# Patient Record
Sex: FEMALE | Race: WHITE | NOT HISPANIC OR LATINO | URBAN - METROPOLITAN AREA
[De-identification: names, ages, dates, MRNs, and addresses within clinical notes are randomized per-mention and may not be internally consistent; named-entity substitution may affect disease eponyms.]

---

## 2017-07-21 ENCOUNTER — EMERGENCY (EMERGENCY)
Facility: HOSPITAL | Age: 67
LOS: 0 days | Discharge: HOME | End: 2017-07-22
Attending: EMERGENCY MEDICINE | Admitting: EMERGENCY MEDICINE
Payer: MEDICARE

## 2017-07-21 VITALS — HEIGHT: 69 IN | WEIGHT: 155.43 LBS

## 2017-07-21 DIAGNOSIS — Z98.84 BARIATRIC SURGERY STATUS: ICD-10-CM

## 2017-07-21 DIAGNOSIS — Z90.89 ACQUIRED ABSENCE OF OTHER ORGANS: ICD-10-CM

## 2017-07-21 DIAGNOSIS — R42 DIZZINESS AND GIDDINESS: ICD-10-CM

## 2017-07-21 DIAGNOSIS — Z98.890 OTHER SPECIFIED POSTPROCEDURAL STATES: ICD-10-CM

## 2017-07-21 DIAGNOSIS — E03.9 HYPOTHYROIDISM, UNSPECIFIED: ICD-10-CM

## 2017-07-21 DIAGNOSIS — E87.1 HYPO-OSMOLALITY AND HYPONATREMIA: ICD-10-CM

## 2017-07-21 DIAGNOSIS — R06.02 SHORTNESS OF BREATH: ICD-10-CM

## 2017-07-21 DIAGNOSIS — Z72.89 OTHER PROBLEMS RELATED TO LIFESTYLE: ICD-10-CM

## 2017-07-21 DIAGNOSIS — R94.31 ABNORMAL ELECTROCARDIOGRAM [ECG] [EKG]: ICD-10-CM

## 2017-07-21 DIAGNOSIS — M19.90 UNSPECIFIED OSTEOARTHRITIS, UNSPECIFIED SITE: ICD-10-CM

## 2017-07-21 LAB
ALBUMIN SERPL ELPH-MCNC: 3.7 G/DL — SIGNIFICANT CHANGE UP (ref 3.3–5)
ALP SERPL-CCNC: 72 U/L — SIGNIFICANT CHANGE UP (ref 40–120)
ALT FLD-CCNC: 27 U/L — SIGNIFICANT CHANGE UP (ref 12–78)
ANION GAP SERPL CALC-SCNC: 10 MMOL/L — SIGNIFICANT CHANGE UP (ref 5–17)
APTT BLD: 30.7 SEC — SIGNIFICANT CHANGE UP (ref 27.5–37.4)
AST SERPL-CCNC: 24 U/L — SIGNIFICANT CHANGE UP (ref 15–37)
BASOPHILS # BLD AUTO: 0.1 K/UL — SIGNIFICANT CHANGE UP (ref 0–0.2)
BASOPHILS NFR BLD AUTO: 1.6 % — SIGNIFICANT CHANGE UP (ref 0–2)
BILIRUB SERPL-MCNC: 0.6 MG/DL — SIGNIFICANT CHANGE UP (ref 0.2–1.2)
BUN SERPL-MCNC: 14 MG/DL — SIGNIFICANT CHANGE UP (ref 7–23)
CALCIUM SERPL-MCNC: 9 MG/DL — SIGNIFICANT CHANGE UP (ref 8.5–10.1)
CHLORIDE SERPL-SCNC: 81 MMOL/L — LOW (ref 96–108)
CO2 SERPL-SCNC: 28 MMOL/L — SIGNIFICANT CHANGE UP (ref 22–31)
CREAT SERPL-MCNC: 0.76 MG/DL — SIGNIFICANT CHANGE UP (ref 0.5–1.3)
EOSINOPHIL # BLD AUTO: 0 K/UL — SIGNIFICANT CHANGE UP (ref 0–0.5)
EOSINOPHIL NFR BLD AUTO: 0.2 % — SIGNIFICANT CHANGE UP (ref 0–6)
GLUCOSE SERPL-MCNC: 131 MG/DL — HIGH (ref 70–99)
HCT VFR BLD CALC: 38.4 % — SIGNIFICANT CHANGE UP (ref 34.5–45)
HGB BLD-MCNC: 13.2 G/DL — SIGNIFICANT CHANGE UP (ref 11.5–15.5)
INR BLD: 1.03 RATIO — SIGNIFICANT CHANGE UP (ref 0.88–1.16)
LYMPHOCYTES # BLD AUTO: 0.8 K/UL — LOW (ref 1–3.3)
LYMPHOCYTES # BLD AUTO: 15.7 % — SIGNIFICANT CHANGE UP (ref 13–44)
MCHC RBC-ENTMCNC: 29 PG — SIGNIFICANT CHANGE UP (ref 27–34)
MCHC RBC-ENTMCNC: 34.3 GM/DL — SIGNIFICANT CHANGE UP (ref 32–36)
MCV RBC AUTO: 84.4 FL — SIGNIFICANT CHANGE UP (ref 80–100)
MONOCYTES # BLD AUTO: 0.4 K/UL — SIGNIFICANT CHANGE UP (ref 0–0.9)
MONOCYTES NFR BLD AUTO: 7.6 % — SIGNIFICANT CHANGE UP (ref 2–14)
NEUTROPHILS # BLD AUTO: 3.8 K/UL — SIGNIFICANT CHANGE UP (ref 1.8–7.4)
NEUTROPHILS NFR BLD AUTO: 74.8 % — SIGNIFICANT CHANGE UP (ref 43–77)
PLATELET # BLD AUTO: 490 K/UL — HIGH (ref 150–400)
POTASSIUM SERPL-MCNC: 3.6 MMOL/L — SIGNIFICANT CHANGE UP (ref 3.5–5.3)
POTASSIUM SERPL-SCNC: 3.6 MMOL/L — SIGNIFICANT CHANGE UP (ref 3.5–5.3)
PROT SERPL-MCNC: 6.7 GM/DL — SIGNIFICANT CHANGE UP (ref 6–8.3)
PROTHROM AB SERPL-ACNC: 11.1 SEC — SIGNIFICANT CHANGE UP (ref 9.8–12.7)
RBC # BLD: 4.56 M/UL — SIGNIFICANT CHANGE UP (ref 3.8–5.2)
RBC # FLD: 13.2 % — SIGNIFICANT CHANGE UP (ref 10.3–14.5)
SODIUM SERPL-SCNC: 119 MMOL/L — CRITICAL LOW (ref 135–145)
TROPONIN I SERPL-MCNC: <0.015 NG/ML — SIGNIFICANT CHANGE UP (ref 0.01–0.04)
TROPONIN I SERPL-MCNC: <0.015 NG/ML — SIGNIFICANT CHANGE UP (ref 0.01–0.04)
WBC # BLD: 5 K/UL — SIGNIFICANT CHANGE UP (ref 3.8–10.5)
WBC # FLD AUTO: 5 K/UL — SIGNIFICANT CHANGE UP (ref 3.8–10.5)

## 2017-07-21 PROCEDURE — 99284 EMERGENCY DEPT VISIT MOD MDM: CPT | Mod: 25

## 2017-07-21 PROCEDURE — 93010 ELECTROCARDIOGRAM REPORT: CPT

## 2017-07-21 PROCEDURE — 71010: CPT | Mod: 26

## 2017-07-21 RX ORDER — SODIUM CHLORIDE 9 MG/ML
3 INJECTION INTRAMUSCULAR; INTRAVENOUS; SUBCUTANEOUS ONCE
Qty: 0 | Refills: 0 | Status: COMPLETED | OUTPATIENT
Start: 2017-07-21 | End: 2017-07-21

## 2017-07-21 RX ORDER — SODIUM CHLORIDE 9 MG/ML
2000 INJECTION INTRAMUSCULAR; INTRAVENOUS; SUBCUTANEOUS ONCE
Qty: 0 | Refills: 0 | Status: COMPLETED | OUTPATIENT
Start: 2017-07-21 | End: 2017-07-21

## 2017-07-21 RX ADMIN — SODIUM CHLORIDE 2000 MILLILITER(S): 9 INJECTION INTRAMUSCULAR; INTRAVENOUS; SUBCUTANEOUS at 20:00

## 2017-07-21 RX ADMIN — SODIUM CHLORIDE 3 MILLILITER(S): 9 INJECTION INTRAMUSCULAR; INTRAVENOUS; SUBCUTANEOUS at 19:35

## 2017-07-21 NOTE — ED ADULT NURSE REASSESSMENT NOTE - NS ED NURSE REASSESS COMMENT FT1
pt resting comfortably with rails up, cardiac monitor in nplace, family at bedside. will continue to monitor.

## 2017-07-21 NOTE — ED ADULT NURSE REASSESSMENT NOTE - NS ED NURSE REASSESS COMMENT FT1
pt ambulatory to restroom with assist. warm blanket provided and cardiac monitor remains in place. VSS, IV fluids infusing. safety maintained, will continue to monitor.

## 2017-07-21 NOTE — ED ADULT NURSE NOTE - OBJECTIVE STATEMENT
c/o SOB, dizziness, nausea, diarrhea since yesterday after 5 days of drinking 1gallon of wine daily. Hx ETOH

## 2017-07-21 NOTE — ED ADULT NURSE REASSESSMENT NOTE - COMFORT CARE
side rails up/warm blanket provided/repositioned/hourly rounding completed
patient is comfortable after ambulated to the bathroom. family is at bedside. hourly rounding completed/side rails up/assisted to bathroom/ambulated to bathroom/repositioned

## 2017-07-21 NOTE — ED ADULT NURSE NOTE - CHIEF COMPLAINT QUOTE
c/o Shortness of breath, dizziness, vomiting, diarrhea for past 2 days, pt states she may be having etoh withdrawl, last drink over 24 hour ago, pt has history of aortic valve stenosis.

## 2017-07-21 NOTE — ED ADULT NURSE REASSESSMENT NOTE - GENERAL PATIENT STATE
comfortable appearance/cooperative/smiling/interactive
resting/sleeping/comfortable appearance/cooperative

## 2017-07-22 VITALS
HEART RATE: 74 BPM | OXYGEN SATURATION: 100 % | SYSTOLIC BLOOD PRESSURE: 130 MMHG | DIASTOLIC BLOOD PRESSURE: 89 MMHG | RESPIRATION RATE: 16 BRPM

## 2017-07-22 LAB
ANION GAP SERPL CALC-SCNC: 7 MMOL/L — SIGNIFICANT CHANGE UP (ref 5–17)
BUN SERPL-MCNC: 12 MG/DL — SIGNIFICANT CHANGE UP (ref 7–23)
CALCIUM SERPL-MCNC: 8.2 MG/DL — LOW (ref 8.5–10.1)
CHLORIDE SERPL-SCNC: 92 MMOL/L — LOW (ref 96–108)
CO2 SERPL-SCNC: 29 MMOL/L — SIGNIFICANT CHANGE UP (ref 22–31)
CREAT SERPL-MCNC: 0.63 MG/DL — SIGNIFICANT CHANGE UP (ref 0.5–1.3)
GLUCOSE SERPL-MCNC: 104 MG/DL — HIGH (ref 70–99)
POTASSIUM SERPL-MCNC: 3.8 MMOL/L — SIGNIFICANT CHANGE UP (ref 3.5–5.3)
POTASSIUM SERPL-SCNC: 3.8 MMOL/L — SIGNIFICANT CHANGE UP (ref 3.5–5.3)
SODIUM SERPL-SCNC: 128 MMOL/L — LOW (ref 135–145)

## 2017-07-22 NOTE — ED PROVIDER NOTE - MEDICAL DECISION MAKING DETAILS
Marycuhy CHILEL: Spoke with patient regarding her lab work and repeat sodium level. Patient has no seizure like activity, no shaking. no palpitations. No cp, sob, or palpitation now. Two negative troponin. Patient's results discussed with patient and copies given. Patient instructed to follow up with PMD and to return if any worsening of symptoms.

## 2017-07-22 NOTE — ED PROVIDER NOTE - OBJECTIVE STATEMENT
66 year old female with PMH of osteoarthritis, hypothyroidism, and alcohol use/abuse presents with cc of sob, nausea, vomiting but with no blood nor coffee ground emesis, and no abdominal pain, also complaining of fatigue. No cp, or palpitation. No shoulder or jaw pain. No spinal pain. No incontinence. No melena or hematochezia. No dysuria hematuria or frequency. No recent exotic travel. No visual problems. No seizures. No syncope. No incontinence. Patient states she also feels like she might be having these symptoms due to not having drank ETOH prior to the ED visit likely in her view withdrawal type symptoms.

## 2017-07-22 NOTE — ED PROVIDER NOTE - CARE PLAN
Principal Discharge DX:	Shortness of breath  Secondary Diagnosis:	Hyponatremia  Secondary Diagnosis:	Alcohol use

## 2017-07-22 NOTE — ED PROVIDER NOTE - PSH
Abdominal cyst removed      Section   d/t failure to progress and Repeat   Gastric Bypass  for Obesity 2004    History of Appendectomy    Parotid Mass Removed     S/P left Bunionectomy     Vocal Cord Polyp Removed   Denies current vocal cord polyps

## 2017-07-22 NOTE — ED ADULT NURSE REASSESSMENT NOTE - NS ED NURSE REASSESS COMMENT FT1
pt aware of repeat sodium level 128, no seizure activity noted while in the ED or prior to arrival per family. Pt comfortable for discharge. pt aware of repeat sodium level 128, no seizure activity noted while in the ED or prior to arrival per family. Pt comfortable for discharge and agrees to follow up.

## 2017-07-22 NOTE — ED PROVIDER NOTE - NEUROLOGICAL, MLM
Alert and oriented, no focal deficits, no motor or sensory deficits. CNs 2-12 intact. 5/5 strength in upper and lower extremities on motion.

## 2018-03-24 ENCOUNTER — EMERGENCY (EMERGENCY)
Facility: HOSPITAL | Age: 68
LOS: 0 days | Discharge: HOME | End: 2018-03-24
Attending: EMERGENCY MEDICINE | Admitting: EMERGENCY MEDICINE
Payer: MEDICARE

## 2018-03-24 VITALS
SYSTOLIC BLOOD PRESSURE: 131 MMHG | TEMPERATURE: 98 F | HEART RATE: 65 BPM | RESPIRATION RATE: 18 BRPM | DIASTOLIC BLOOD PRESSURE: 91 MMHG | OXYGEN SATURATION: 100 %

## 2018-03-24 VITALS — HEIGHT: 69 IN | WEIGHT: 149.91 LBS

## 2018-03-24 DIAGNOSIS — F10.10 ALCOHOL ABUSE, UNCOMPLICATED: ICD-10-CM

## 2018-03-24 DIAGNOSIS — M79.89 OTHER SPECIFIED SOFT TISSUE DISORDERS: ICD-10-CM

## 2018-03-24 DIAGNOSIS — E88.09 OTHER DISORDERS OF PLASMA-PROTEIN METABOLISM, NOT ELSEWHERE CLASSIFIED: ICD-10-CM

## 2018-03-24 DIAGNOSIS — I82.491 ACUTE EMBOLISM AND THROMBOSIS OF OTHER SPECIFIED DEEP VEIN OF RIGHT LOWER EXTREMITY: ICD-10-CM

## 2018-03-24 DIAGNOSIS — E87.6 HYPOKALEMIA: ICD-10-CM

## 2018-03-24 LAB
ALBUMIN SERPL ELPH-MCNC: 2.2 G/DL — LOW (ref 3.3–5)
ALP SERPL-CCNC: 94 U/L — SIGNIFICANT CHANGE UP (ref 40–120)
ALT FLD-CCNC: 25 U/L — SIGNIFICANT CHANGE UP (ref 12–78)
ANION GAP SERPL CALC-SCNC: 6 MMOL/L — SIGNIFICANT CHANGE UP (ref 5–17)
APTT BLD: 32.7 SEC — SIGNIFICANT CHANGE UP (ref 27.5–37.4)
AST SERPL-CCNC: 21 U/L — SIGNIFICANT CHANGE UP (ref 15–37)
BASOPHILS # BLD AUTO: 0.06 K/UL — SIGNIFICANT CHANGE UP (ref 0–0.2)
BASOPHILS NFR BLD AUTO: 1.5 % — SIGNIFICANT CHANGE UP (ref 0–2)
BILIRUB SERPL-MCNC: 0.3 MG/DL — SIGNIFICANT CHANGE UP (ref 0.2–1.2)
BUN SERPL-MCNC: 16 MG/DL — SIGNIFICANT CHANGE UP (ref 7–23)
CALCIUM SERPL-MCNC: 7.6 MG/DL — LOW (ref 8.5–10.1)
CHLORIDE SERPL-SCNC: 108 MMOL/L — SIGNIFICANT CHANGE UP (ref 96–108)
CO2 SERPL-SCNC: 29 MMOL/L — SIGNIFICANT CHANGE UP (ref 22–31)
CREAT SERPL-MCNC: 0.85 MG/DL — SIGNIFICANT CHANGE UP (ref 0.5–1.3)
EOSINOPHIL # BLD AUTO: 0.08 K/UL — SIGNIFICANT CHANGE UP (ref 0–0.5)
EOSINOPHIL NFR BLD AUTO: 2 % — SIGNIFICANT CHANGE UP (ref 0–6)
GLUCOSE SERPL-MCNC: 48 MG/DL — LOW (ref 70–99)
HCT VFR BLD CALC: 33 % — LOW (ref 34.5–45)
HGB BLD-MCNC: 10.9 G/DL — LOW (ref 11.5–15.5)
IMM GRANULOCYTES NFR BLD AUTO: 0.2 % — SIGNIFICANT CHANGE UP (ref 0–1.5)
INR BLD: 1.06 RATIO — SIGNIFICANT CHANGE UP (ref 0.88–1.16)
LYMPHOCYTES # BLD AUTO: 1.32 K/UL — SIGNIFICANT CHANGE UP (ref 1–3.3)
LYMPHOCYTES # BLD AUTO: 32.4 % — SIGNIFICANT CHANGE UP (ref 13–44)
MAGNESIUM SERPL-MCNC: 1.8 MG/DL — SIGNIFICANT CHANGE UP (ref 1.6–2.6)
MCHC RBC-ENTMCNC: 29.7 PG — SIGNIFICANT CHANGE UP (ref 27–34)
MCHC RBC-ENTMCNC: 33 GM/DL — SIGNIFICANT CHANGE UP (ref 32–36)
MCV RBC AUTO: 89.9 FL — SIGNIFICANT CHANGE UP (ref 80–100)
MONOCYTES # BLD AUTO: 0.34 K/UL — SIGNIFICANT CHANGE UP (ref 0–0.9)
MONOCYTES NFR BLD AUTO: 8.3 % — SIGNIFICANT CHANGE UP (ref 2–14)
NEUTROPHILS # BLD AUTO: 2.27 K/UL — SIGNIFICANT CHANGE UP (ref 1.8–7.4)
NEUTROPHILS NFR BLD AUTO: 55.6 % — SIGNIFICANT CHANGE UP (ref 43–77)
NRBC # BLD: 0 /100 WBCS — SIGNIFICANT CHANGE UP (ref 0–0)
PLATELET # BLD AUTO: 292 K/UL — SIGNIFICANT CHANGE UP (ref 150–400)
POTASSIUM SERPL-MCNC: 3 MMOL/L — LOW (ref 3.5–5.3)
POTASSIUM SERPL-SCNC: 3 MMOL/L — LOW (ref 3.5–5.3)
PROT SERPL-MCNC: 4.8 GM/DL — LOW (ref 6–8.3)
PROTHROM AB SERPL-ACNC: 11.5 SEC — SIGNIFICANT CHANGE UP (ref 9.8–12.7)
RBC # BLD: 3.67 M/UL — LOW (ref 3.8–5.2)
RBC # FLD: 16.8 % — HIGH (ref 10.3–14.5)
SODIUM SERPL-SCNC: 143 MMOL/L — SIGNIFICANT CHANGE UP (ref 135–145)
WBC # BLD: 4.08 K/UL — SIGNIFICANT CHANGE UP (ref 3.8–10.5)
WBC # FLD AUTO: 4.08 K/UL — SIGNIFICANT CHANGE UP (ref 3.8–10.5)

## 2018-03-24 PROCEDURE — 93010 ELECTROCARDIOGRAM REPORT: CPT

## 2018-03-24 PROCEDURE — 99285 EMERGENCY DEPT VISIT HI MDM: CPT

## 2018-03-24 PROCEDURE — 93970 EXTREMITY STUDY: CPT | Mod: 26

## 2018-03-24 RX ORDER — POTASSIUM CHLORIDE 20 MEQ
40 PACKET (EA) ORAL ONCE
Qty: 0 | Refills: 0 | Status: COMPLETED | OUTPATIENT
Start: 2018-03-24 | End: 2018-03-24

## 2018-03-24 RX ADMIN — Medication 40 MILLIEQUIVALENT(S): at 18:38

## 2018-03-24 NOTE — ED STATDOCS - PLAN OF CARE
1) Continue home medications as prescribed   2) Follow up with Dr. Barroos in 1-2 days for reevaluation, call for appointment   3) You were given a copy of your results, please show them to your doctor for review.   4) Repeat ultrasound within 7 days for evaluation of deep venous thrombosis   5) Your potassium was low please discuss this with your doctor   6) Your protein level was low please discuss this with your doctor  7) Return to the ED for worsening swelling, pain, redness, fever greater than 100.4, chest pain, shortness of breath, nausea, vomiting, dizziness, lightheadedness, or if you have any other new, worsening, or concerning symptoms.

## 2018-03-24 NOTE — ED STATDOCS - CARDIAC, MLM
normal rate, regular rhythm. 2+ pitting edema bilateral lower extremities. Palpable dorsalis pedis pulses.

## 2018-03-24 NOTE — ED ADULT TRIAGE NOTE - CHIEF COMPLAINT QUOTE
Pt. to the ED C/O Bilateral leg swelling x 1 month - Pt. states she was told by PCP to come to the ED - Denies CP and SOB -

## 2018-03-24 NOTE — ED STATDOCS - CARE PLAN
Principal Discharge DX:	Hypokalemia  Assessment and plan of treatment:	1) Continue home medications as prescribed   2) Follow up with Dr. Barroso in 1-2 days for reevaluation, call for appointment   3) You were given a copy of your results, please show them to your doctor for review.   4) Repeat ultrasound within 7 days for evaluation of deep venous thrombosis   5) Your potassium was low please discuss this with your doctor   6) Your protein level was low please discuss this with your doctor  7) Return to the ED for worsening swelling, pain, redness, fever greater than 100.4, chest pain, shortness of breath, nausea, vomiting, dizziness, lightheadedness, or if you have any other new, worsening, or concerning symptoms.  Secondary Diagnosis:	Hypoalbuminemia  Secondary Diagnosis:	Acute deep vein thrombosis (DVT) of other specified vein of right lower extremity

## 2018-03-24 NOTE — ED STATDOCS - PROGRESS NOTE DETAILS
MD Kaleigh: Patient initially seen by attending in intake. On reevaluation this is a 67 year old female, past medical history hypothyroidism, alcohol abuse, s/p rehab 1 month ago, presents to the ED for bilateral lower leg swelling over past month. Patient reports no pain, redness, chest pain, shortness of breath, fevers or chills.   Gen: no acute distress non toxic alert and coherent, no cyanosis HEENT: no scleral icterus PERRL EOMI Resp: No acute distress, equal chest rise and fall, lungs clear bilaterally, CVS: RRR S1/S2, soft systolic murmur no gallop. pedal edema 1+ bilaterally, no jvd ABD: soft supple non tender, no rebound or guarding no hepatosplenomegaly no other masses. no hernias. Extremities: No deformities, no rash Neuro: Alert, No focal neuro deficits Power equal / normal. Sensory intact , ambulatory,   Likely related to low albumin given chronic alcohol abuse, evaluate for renal failure. d/w Dr. Calvert, although cardiomyopathy on differential patient without chest pain, shortness of breath, so holding BNP, urgent TTE Patient found to have right gastroc deep venous thrombosis without proximal extension, discussed with Dr. Walker Barroso given controversial treatment will do serial exams at this time, patient to f/u in office in 1-2 days for reevaluation, and repeat sono within 1 week. Patient also noted to be hypoalbuminemic and hypokalemic, Dr. Barroso will follow this up. He will also likely get outpatient TTE.   Patient feels well, she has no calf pain or swelling bilaterally, no chest pain or shortness of breath, on  reexamination, lungs clear, no resp distress, cardiac RRR, S1 and S2, soft systolic murmur, no calf tenderness, 1+ pitting edema, negative brennan sign,   discussed labwork and imaging with patient and need for f/u with primary medical doctor, strict return precautions given, especially signs/symptoms of worsening DVT/PE. D/W Dr. Calvert

## 2018-06-20 ENCOUNTER — EMERGENCY (EMERGENCY)
Facility: HOSPITAL | Age: 68
LOS: 0 days | Discharge: HOME | End: 2018-06-20
Attending: EMERGENCY MEDICINE | Admitting: EMERGENCY MEDICINE
Payer: MEDICARE

## 2018-06-20 VITALS
OXYGEN SATURATION: 99 % | DIASTOLIC BLOOD PRESSURE: 77 MMHG | RESPIRATION RATE: 17 BRPM | SYSTOLIC BLOOD PRESSURE: 119 MMHG | TEMPERATURE: 98 F | HEART RATE: 84 BPM

## 2018-06-20 VITALS
TEMPERATURE: 98 F | WEIGHT: 130.07 LBS | HEART RATE: 80 BPM | HEIGHT: 65 IN | RESPIRATION RATE: 16 BRPM | DIASTOLIC BLOOD PRESSURE: 80 MMHG | OXYGEN SATURATION: 97 % | SYSTOLIC BLOOD PRESSURE: 108 MMHG
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
## 2018-06-20 DIAGNOSIS — F10.120 ALCOHOL ABUSE WITH INTOXICATION, UNCOMPLICATED: ICD-10-CM

## 2018-06-20 DIAGNOSIS — E03.9 HYPOTHYROIDISM, UNSPECIFIED: ICD-10-CM

## 2018-06-20 DIAGNOSIS — Z98.84 BARIATRIC SURGERY STATUS: ICD-10-CM

## 2018-06-20 LAB
ALBUMIN SERPL ELPH-MCNC: 2.1 G/DL — LOW (ref 3.3–5)
ALP SERPL-CCNC: 354 U/L — HIGH (ref 40–120)
ALT FLD-CCNC: 97 U/L — HIGH (ref 12–78)
ANION GAP SERPL CALC-SCNC: 11 MMOL/L — SIGNIFICANT CHANGE UP (ref 5–17)
APAP SERPL-MCNC: 4 UG/ML — LOW (ref 10–30)
APTT BLD: 33 SEC — SIGNIFICANT CHANGE UP (ref 27.5–37.4)
AST SERPL-CCNC: 93 U/L — HIGH (ref 15–37)
BASOPHILS # BLD AUTO: 0.07 K/UL — SIGNIFICANT CHANGE UP (ref 0–0.2)
BASOPHILS NFR BLD AUTO: 1.9 % — SIGNIFICANT CHANGE UP (ref 0–2)
BILIRUB SERPL-MCNC: 0.3 MG/DL — SIGNIFICANT CHANGE UP (ref 0.2–1.2)
BUN SERPL-MCNC: 17 MG/DL — SIGNIFICANT CHANGE UP (ref 7–23)
CALCIUM SERPL-MCNC: 7.6 MG/DL — LOW (ref 8.5–10.1)
CHLORIDE SERPL-SCNC: 96 MMOL/L — SIGNIFICANT CHANGE UP (ref 96–108)
CO2 SERPL-SCNC: 27 MMOL/L — SIGNIFICANT CHANGE UP (ref 22–31)
CREAT SERPL-MCNC: 1.01 MG/DL — SIGNIFICANT CHANGE UP (ref 0.5–1.3)
EOSINOPHIL # BLD AUTO: 0.02 K/UL — SIGNIFICANT CHANGE UP (ref 0–0.5)
EOSINOPHIL NFR BLD AUTO: 0.5 % — SIGNIFICANT CHANGE UP (ref 0–6)
ETHANOL SERPL-MCNC: 112 MG/DL — HIGH (ref 0–10)
GLUCOSE SERPL-MCNC: 66 MG/DL — LOW (ref 70–99)
HCT VFR BLD CALC: 37.1 % — SIGNIFICANT CHANGE UP (ref 34.5–45)
HGB BLD-MCNC: 12.9 G/DL — SIGNIFICANT CHANGE UP (ref 11.5–15.5)
IMM GRANULOCYTES NFR BLD AUTO: 0.3 % — SIGNIFICANT CHANGE UP (ref 0–1.5)
INR BLD: 0.94 RATIO — SIGNIFICANT CHANGE UP (ref 0.88–1.16)
LYMPHOCYTES # BLD AUTO: 1.11 K/UL — SIGNIFICANT CHANGE UP (ref 1–3.3)
LYMPHOCYTES # BLD AUTO: 30 % — SIGNIFICANT CHANGE UP (ref 13–44)
MCHC RBC-ENTMCNC: 31.2 PG — SIGNIFICANT CHANGE UP (ref 27–34)
MCHC RBC-ENTMCNC: 34.8 GM/DL — SIGNIFICANT CHANGE UP (ref 32–36)
MCV RBC AUTO: 89.6 FL — SIGNIFICANT CHANGE UP (ref 80–100)
MONOCYTES # BLD AUTO: 0.2 K/UL — SIGNIFICANT CHANGE UP (ref 0–0.9)
MONOCYTES NFR BLD AUTO: 5.4 % — SIGNIFICANT CHANGE UP (ref 2–14)
NEUTROPHILS # BLD AUTO: 2.29 K/UL — SIGNIFICANT CHANGE UP (ref 1.8–7.4)
NEUTROPHILS NFR BLD AUTO: 61.9 % — SIGNIFICANT CHANGE UP (ref 43–77)
NRBC # BLD: 0 /100 WBCS — SIGNIFICANT CHANGE UP (ref 0–0)
PLATELET # BLD AUTO: 207 K/UL — SIGNIFICANT CHANGE UP (ref 150–400)
POTASSIUM SERPL-MCNC: 4.5 MMOL/L — SIGNIFICANT CHANGE UP (ref 3.5–5.3)
POTASSIUM SERPL-SCNC: 4.5 MMOL/L — SIGNIFICANT CHANGE UP (ref 3.5–5.3)
PROT SERPL-MCNC: 5 GM/DL — LOW (ref 6–8.3)
PROTHROM AB SERPL-ACNC: 10.1 SEC — SIGNIFICANT CHANGE UP (ref 9.8–12.7)
RBC # BLD: 4.14 M/UL — SIGNIFICANT CHANGE UP (ref 3.8–5.2)
RBC # FLD: 17.2 % — HIGH (ref 10.3–14.5)
SALICYLATES SERPL-MCNC: <1.7 MG/DL — LOW (ref 2.8–20)
SODIUM SERPL-SCNC: 134 MMOL/L — LOW (ref 135–145)
WBC # BLD: 3.7 K/UL — LOW (ref 3.8–10.5)
WBC # FLD AUTO: 3.7 K/UL — LOW (ref 3.8–10.5)

## 2018-06-20 PROCEDURE — 93010 ELECTROCARDIOGRAM REPORT: CPT

## 2018-06-20 PROCEDURE — 99285 EMERGENCY DEPT VISIT HI MDM: CPT

## 2018-06-20 RX ORDER — SODIUM CHLORIDE 9 MG/ML
1000 INJECTION, SOLUTION INTRAVENOUS
Qty: 0 | Refills: 0 | Status: DISCONTINUED | OUTPATIENT
Start: 2018-06-20 | End: 2018-06-20

## 2018-06-20 RX ADMIN — SODIUM CHLORIDE 30 MILLILITER(S): 9 INJECTION, SOLUTION INTRAVENOUS at 13:41

## 2018-06-20 NOTE — ED ADULT NURSE NOTE - CAS DISCH CONDITION
BRIDGET Siddiqi spoke with  and pt regarding discharge, pt appropriate to discharge at this time./Improved

## 2018-06-20 NOTE — ED PROVIDER NOTE - CONSTITUTIONAL, MLM
normal... Well appearing, awake, alert, oriented to person, place, time/situation and in no apparent distress. Mildly cachectic. Well appearing, awake, alert, oriented to person, place, time/situation and in no apparent distress. Moderately cachectic.

## 2018-06-20 NOTE — ED ADULT TRIAGE NOTE - CHIEF COMPLAINT QUOTE
Patient presents with ETOH, according to EMS  called in as patient made suicidal statements. Patient denies suicidal statements or depressive thoughts. Patient failure to thrive, has been recently using excessive ETOH as per

## 2018-06-20 NOTE — ED ADULT NURSE NOTE - CHPI ED SYMPTOMS NEG
no fever/no nausea/no pain/no weakness/no vomiting/no abdominal pain/no disorientation/no chills/no abdominal distension/no confusion

## 2018-06-20 NOTE — ED ADULT NURSE NOTE - OBJECTIVE STATEMENT
Pt is a 67y female, A & O x 3, VSS, presents to ED w/ ETOH abuse, family sent pt to ER because concerned of frequent alcohol use, pt has no complaints, denies SI/SA, as per MD Fleming not a 1:1 at this time, bed rails up, will continue to monitor.

## 2018-06-20 NOTE — ED PROVIDER NOTE - OBJECTIVE STATEMENT
68 y/o female with a PMHx of hypothyroidism, Gastric Bypass (2004), H/O Appendectomy, Parotid Mass Removed (1984), S/P left Bunionectomy (1990), osteoarthritis of the left knee presents to the ED BIBA because her  called the police due to her excessive drinking recently. She states she has been drinking around the clock for about a week. Denies n/v/d/f/c. Pt does expresses she has no desire to stop drinking. Pt's last drink was about an hour ago. Pt states she doesn't want at the hospital and that she is angry and wants to go home. As per ACR, pt was having suicidal ideation to .   PCP: Dr. Barroso 68 y/o female with a PMHx of hypothyroidism, Gastric Bypass (2004), H/O Appendectomy, Parotid Mass Removed (1984), S/P left Bunionectomy (1990), osteoarthritis of the left knee presents to the ED BIBA because her  called the police due to her excessive drinking recently. She states she has been drinking around the clock for about a week. Denies n/v/d/f/c. Pt does expresses she has no desire to stop drinking. Pt's last drink was about an hour ago. Pt states she doesn't want to be at the hospital and that she is angry and wants to go home. As per ACR, pt was having suicidal ideation to .   PCP: Dr. Barroso

## 2018-06-20 NOTE — ED PROVIDER NOTE - PROGRESS NOTE DETAILS
Alice CHILEL: Pt is AAO x4 and is furios and does not wish to stay in Ed for psych eval. Pt has capacity to make deicsions on her own. Pt states she does not want to stop drinking is not seeking help and does not want to speak to psychiatry. Pt states was uin therapy for years and it did not help. Now just wants to go home and drink more. Pt has capacity and will be d/c.

## 2018-12-05 NOTE — ED PROVIDER NOTE - PSH
Telephone Encounter by Saige Hernandez RMA at 11/10/17 09:28 AM     Author:  Saige Hernandez RMA Service:  (none) Author Type:  Medical Assistant     Filed:  11/10/17 09:30 AM Encounter Date:  11/9/2017 Status:  Signed     :  Saige Hernandez RMA (Medical Assistant)            Patient notified.  Parent verbalized understanding.[EP1.1T]  order completed and phone number given to call and schedule[EP1.1M]   Electronically Signed by:    CORINNA Hernandes , 11/10/2017[EP1.2T]        Revision History        User Key Date/Time User Provider Type Action    > EP1.2 11/10/17 09:30 AM Saige Hernandez RMA Medical Assistant Sign     EP1.1 11/10/17 09:28 AM Saige Hernandez RMA Medical Assistant     M - Manual, T - Template             Abdominal cyst removed      Section   d/t failure to progress and Repeat   Gastric Bypass  for Obesity 2004    History of Appendectomy    Parotid Mass Removed     S/P left Bunionectomy     Vocal Cord Polyp Removed   Denies current vocal cord polyps

## 2019-07-22 NOTE — ASU PATIENT PROFILE, ADULT - NS TRANSFER PATIENT BELONGINGS
Jewelry/Money (specify)/Clothing/Cell Phone/PDA (specify)/1 black watch; 1 gold colored wedding band ring

## 2019-07-23 NOTE — H&P ADULT - NSHPPHYSICALEXAM_GEN_ALL_CORE
Constitutional: well developed, well nourished, no deformities and no acute distress    Neurological: Alert & Oriented x 3, WISE, no focal deficits    HEENT: NC/AT, PERRLA, EOMI,  Neck supple.    Respiratory: CTA B/L, No wheezing/crackles/rhonchi    Cardiovascular: (+) S1 & S2, RRR, + 2/6 systolic murmur, no rubs/ gallops     Gastrointestinal: soft, NT, nondistended, (+) BS    Genitourinary: non distended bladder, voiding freely    Extremities: No pedal edema, No clubbing, No cyanosis    Skin:  normal skin color and pigmentation, no skin lesions

## 2019-07-23 NOTE — H&P ADULT - ASSESSMENT
68 year old female with PMHx AS, hypothyroidism presented to cardiologist with SOB on exertion  Stress echo suggesting of anterior and inferior wall ischemia and severe aortic stenosis. Referred for RHC and LHC  ASA:  Bleeding  Risk score:  Creatinine:  GFR: 68 year old female with PMHx AS, hypothyroidism presented to cardiologist with SOB on exertion  Stress echo suggesting of anterior and inferior wall ischemia and severe aortic stenosis. Referred for RHC and LHC.   ASA: II  Bleeding  Risk score: 6.8%   Creatinine: 0.7  GFR: 87    - risks and benefits of procedure reviewed, all questions answered   - informed consent obtained, pt verbalized understanding.

## 2019-07-23 NOTE — H&P ADULT - NSHPREVIEWOFSYSTEMS_GEN_ALL_CORE
General: Pt denies recent weight loss/fever/chills    Neurological: denies numbness or  sensation loss    Cardiovascular: denies chest pain/palpitations/leg edema    Respiratory and Thorax: + SOB, denies cough/wheezing    Gastrointestinal: denies abdominal pain/diarrhea/constipation/bloody stool    Genitourinary: denies urinary frequency/urgency/ dysuria    Musculoskeletal: denies joint pain or swelling, denies restricted motion    Hematologic: denies abnormal bleeding

## 2019-07-23 NOTE — H&P ADULT - NSHPLABSRESULTS_GEN_ALL_CORE
7/08/19 Stress echo: Severe AS. Suggestive of anterior and inferior wall ischemia.  7/28/19 Echo: EF 58%, severe AS

## 2019-07-23 NOTE — H&P ADULT - NSICDXPASTSURGICALHX_GEN_ALL_CORE_FT
PAST SURGICAL HISTORY:  Abdominal cyst removed 2008      Section  d/t failure to progress and Repeat     Gastric Bypass  for Obesity 2004     History of Appendectomy     Parotid Mass Removed      S/P left Bunionectomy      Vocal Cord Polyp Removed  Denies current vocal cord polyps

## 2019-07-23 NOTE — H&P ADULT - HISTORY OF PRESENT ILLNESS
68 year old female with PMHx AS, hypothyroidism presented to cardiologist with SOB on exertion  Stress echo suggesting of anterior and inferior wall ischemia and severe aortic stenosis. Referred for RHC and LHC 68 year old female with PMHx AS, hypothyroidism presented to cardiologist with SOB on exertion  Stress echo suggesting of anterior and inferior wall ischemia and severe aortic stenosis. Referred for RHC and LHC.

## 2019-07-23 NOTE — H&P ADULT - NSICDXPASTMEDICALHX_GEN_ALL_CORE_FT
PAST MEDICAL HISTORY:  Aortic stenosis     Eczema     Hypothyroidism     Osteoarthritis left knee arthroscopy 1990

## 2019-07-24 ENCOUNTER — OUTPATIENT (OUTPATIENT)
Dept: OUTPATIENT SERVICES | Facility: HOSPITAL | Age: 69
LOS: 1 days | Discharge: HOME | End: 2019-07-24
Payer: MEDICARE

## 2019-07-24 VITALS
DIASTOLIC BLOOD PRESSURE: 65 MMHG | HEART RATE: 84 BPM | RESPIRATION RATE: 16 BRPM | SYSTOLIC BLOOD PRESSURE: 102 MMHG | OXYGEN SATURATION: 98 %

## 2019-07-24 VITALS — HEIGHT: 69 IN | WEIGHT: 149.91 LBS

## 2019-07-24 DIAGNOSIS — R94.39 ABNORMAL RESULT OF OTHER CARDIOVASCULAR FUNCTION STUDY: ICD-10-CM

## 2019-07-24 DIAGNOSIS — E03.9 HYPOTHYROIDISM, UNSPECIFIED: ICD-10-CM

## 2019-07-24 DIAGNOSIS — Z98.84 BARIATRIC SURGERY STATUS: ICD-10-CM

## 2019-07-24 DIAGNOSIS — I35.0 NONRHEUMATIC AORTIC (VALVE) STENOSIS: ICD-10-CM

## 2019-07-24 DIAGNOSIS — M17.0 BILATERAL PRIMARY OSTEOARTHRITIS OF KNEE: ICD-10-CM

## 2019-07-24 DIAGNOSIS — R06.02 SHORTNESS OF BREATH: ICD-10-CM

## 2019-07-24 LAB
HCT VFR BLD CALC: 31.7 % — LOW (ref 34.5–45)
HCV AB S/CO SERPL IA: 0.09 S/CO — SIGNIFICANT CHANGE UP (ref 0–0.99)
HCV AB SERPL-IMP: SIGNIFICANT CHANGE UP
HGB BLD-MCNC: 9.4 G/DL — LOW (ref 11.5–15.5)
MCHC RBC-ENTMCNC: 22.6 PG — LOW (ref 27–34)
MCHC RBC-ENTMCNC: 29.7 GM/DL — LOW (ref 32–36)
MCV RBC AUTO: 76.2 FL — LOW (ref 80–100)
PLATELET # BLD AUTO: 368 K/UL — SIGNIFICANT CHANGE UP (ref 150–400)
RBC # BLD: 4.16 M/UL — SIGNIFICANT CHANGE UP (ref 3.8–5.2)
RBC # FLD: 20.8 % — HIGH (ref 10.3–14.5)
WBC # BLD: 4.69 K/UL — SIGNIFICANT CHANGE UP (ref 3.8–10.5)
WBC # FLD AUTO: 4.69 K/UL — SIGNIFICANT CHANGE UP (ref 3.8–10.5)

## 2019-07-24 PROCEDURE — 93010 ELECTROCARDIOGRAM REPORT: CPT

## 2019-07-24 NOTE — PACU DISCHARGE NOTE - COMMENTS
Pt. and  verb. agreement and understanding to and teach back to written and verbal discharge instructions and medication list.   Pt. discharged to home via private auto accompanied by .

## 2019-07-24 NOTE — PACU DISCHARGE NOTE - COMMENTS
Report given to gwendolyn Palma RN in CCU.  Pt. transferred to inpt. room, in CCU, 46, on cardiac monitor via stretcher accompanied by RN and transport tech.

## 2019-07-24 NOTE — PROGRESS NOTE ADULT - SUBJECTIVE AND OBJECTIVE BOX
HPI:  68 year old female with PMHx AS, hypothyroidism presented to cardiologist with SOB on exertion.   Stress echo suggesting of anterior and inferior wall ischemia and severe aortic stenosis. Referred for RHC and LHC.   Now, pt is s/p Rt and Lt heart, revealed normal coronaries and normal Rt heart pressure.     < from: Cardiac Cath Lab - Adult (07.24.19 @ 08:41) >   Cardiac Arteries and Lesion Findings    LMCA: Normal.    LAD: Normal.    LCx: Normal.    RCA: Normal.     Impression     Diagnostic Conclusions     Normal coronary arteries.   Severe AS by non invasive eval   normal right heart pressures     Recommendations     Valvular surgery is recommended.    < end of copied text >      ROS: denies chest pain/ pressure, SOB or palpitation     Physical exam:   General: awake, no acute distress   HEENT: NCAT, neck supple   CV: RRR, normal S1S2, + 3/6 systolic murmur, no rub   Pulmonary: clear, no wheezing or rales   GI: +BS, soft, non-tender, non-distended   : voiding freely   Extremities: no edema, + pedal pulses   Skin: no rashes or lesion. Rt. femoral access site (s/p manual compression on Rt femoral artery 5 fr sheath and 7 fr. Rt femoral venous sheath): no hematoma or bleeding     # s/p Rt and Lt heart, revealed normal coronaries and normal Rt heart pressure   - post procedure, outcome and follow up care reviewed with patient by Dr. La   - continue home medications   - discharge home today   - Plan for outpt Echo and valvular surgery evaluation   - follow up with Dr. La in 4-7 days HPI:  68 year old female with PMHx AS, hypothyroidism presented to cardiologist with SOB on exertion.   Stress echo suggesting of anterior and inferior wall ischemia and severe aortic stenosis. Referred for RHC and LHC.   Now, pt is s/p Rt and Lt heart, revealed normal coronaries and normal Rt heart pressure, unable to measure YONI due to unable to cross the wire through aortic valve.     < from: Cardiac Cath Lab - Adult (07.24.19 @ 08:41) >   Cardiac Arteries and Lesion Findings    LMCA: Normal.    LAD: Normal.    LCx: Normal.    RCA: Normal.     Impression     Diagnostic Conclusions     Normal coronary arteries.   Severe AS by non invasive eval   normal right heart pressures     Recommendations     Valvular surgery is recommended.    < end of copied text >      ROS: denies chest pain/ pressure, SOB or palpitation     Physical exam:   General: awake, no acute distress   HEENT: NCAT, neck supple   CV: RRR, normal S1S2, + 2/6 systolic murmur, no rub   Pulmonary: clear, no wheezing or rales   GI: +BS, soft, non-tender, non-distended   : voiding freely   Extremities: no edema, + pedal pulses   Skin: no rashes or lesion. Rt. femoral access site (s/p manual compression on Rt femoral artery 5 fr sheath and 7 fr. Rt femoral venous sheath): no hematoma or bleeding     # s/p Rt and Lt heart, revealed normal coronaries and normal Rt heart pressure   - post procedure, outcome and follow up care reviewed with patient by Dr. La   - continue home medications   - discharge home today   - Plan for outpt Echo and valvular surgery evaluation   - follow up with Dr. La in 4-7 days

## 2020-03-05 NOTE — ED STATDOCS - OBJECTIVE STATEMENT
66 y/o F w/ pmhx of hypothyroidism and asymptomatic heart murmur, presents to ED c/o bilateral swelling of lower legs x1 month. Pt reports swelling has been worsening as well as c/o new blue color to bilateral feet. Hx of heavy alcohol use intermittently x7 years, consecutively past 6 months , stopped 1 month ago. Denies SOB, CP, or any other acute complaints. Currently on Synthroid and metoprolol. children/daughter, son in law and 2 children/spouse

## 2020-11-02 ENCOUNTER — INPATIENT (INPATIENT)
Facility: HOSPITAL | Age: 70
LOS: 1 days | Discharge: ANOTHER HOSPITAL | DRG: 314 | End: 2020-11-04
Attending: INTERNAL MEDICINE | Admitting: INTERNAL MEDICINE
Payer: MEDICARE

## 2020-11-02 VITALS
DIASTOLIC BLOOD PRESSURE: 56 MMHG | TEMPERATURE: 99 F | HEART RATE: 70 BPM | OXYGEN SATURATION: 100 % | HEIGHT: 69 IN | RESPIRATION RATE: 17 BRPM | SYSTOLIC BLOOD PRESSURE: 90 MMHG

## 2020-11-02 DIAGNOSIS — I95.9 HYPOTENSION, UNSPECIFIED: ICD-10-CM

## 2020-11-02 PROBLEM — I35.0 NONRHEUMATIC AORTIC (VALVE) STENOSIS: Chronic | Status: ACTIVE | Noted: 2019-07-23

## 2020-11-02 LAB
ADD ON TEST-SPECIMEN IN LAB: SIGNIFICANT CHANGE UP
ALBUMIN SERPL ELPH-MCNC: 1.6 G/DL — LOW (ref 3.3–5)
ALP SERPL-CCNC: 85 U/L — SIGNIFICANT CHANGE UP (ref 40–120)
ALT FLD-CCNC: 13 U/L — SIGNIFICANT CHANGE UP (ref 12–78)
ANION GAP SERPL CALC-SCNC: 6 MMOL/L — SIGNIFICANT CHANGE UP (ref 5–17)
ANION GAP SERPL CALC-SCNC: 6 MMOL/L — SIGNIFICANT CHANGE UP (ref 5–17)
APTT BLD: 30.2 SEC — SIGNIFICANT CHANGE UP (ref 27.5–35.5)
AST SERPL-CCNC: 32 U/L — SIGNIFICANT CHANGE UP (ref 15–37)
BASOPHILS # BLD AUTO: 0.1 K/UL — SIGNIFICANT CHANGE UP (ref 0–0.2)
BASOPHILS NFR BLD AUTO: 1 % — SIGNIFICANT CHANGE UP (ref 0–2)
BILIRUB SERPL-MCNC: 0.4 MG/DL — SIGNIFICANT CHANGE UP (ref 0.2–1.2)
BUN SERPL-MCNC: 12 MG/DL — SIGNIFICANT CHANGE UP (ref 7–23)
BUN SERPL-MCNC: 14 MG/DL — SIGNIFICANT CHANGE UP (ref 7–23)
CALCIUM SERPL-MCNC: 5.5 MG/DL — CRITICAL LOW (ref 8.5–10.1)
CALCIUM SERPL-MCNC: 5.5 MG/DL — CRITICAL LOW (ref 8.5–10.1)
CHLORIDE SERPL-SCNC: 90 MMOL/L — LOW (ref 96–108)
CHLORIDE SERPL-SCNC: 98 MMOL/L — SIGNIFICANT CHANGE UP (ref 96–108)
CO2 SERPL-SCNC: 33 MMOL/L — HIGH (ref 22–31)
CO2 SERPL-SCNC: 37 MMOL/L — HIGH (ref 22–31)
CREAT SERPL-MCNC: 0.78 MG/DL — SIGNIFICANT CHANGE UP (ref 0.5–1.3)
CREAT SERPL-MCNC: 0.83 MG/DL — SIGNIFICANT CHANGE UP (ref 0.5–1.3)
EOSINOPHIL # BLD AUTO: 0 K/UL — SIGNIFICANT CHANGE UP (ref 0–0.5)
EOSINOPHIL NFR BLD AUTO: 0 % — SIGNIFICANT CHANGE UP (ref 0–6)
GLUCOSE SERPL-MCNC: 119 MG/DL — HIGH (ref 70–99)
GLUCOSE SERPL-MCNC: 94 MG/DL — SIGNIFICANT CHANGE UP (ref 70–99)
HCT VFR BLD CALC: 22.2 % — LOW (ref 34.5–45)
HCT VFR BLD CALC: 30.5 % — LOW (ref 34.5–45)
HGB BLD-MCNC: 6.8 G/DL — CRITICAL LOW (ref 11.5–15.5)
HGB BLD-MCNC: 9.8 G/DL — LOW (ref 11.5–15.5)
INR BLD: 1.61 RATIO — HIGH (ref 0.88–1.16)
LACTATE SERPL-SCNC: 1.3 MMOL/L — SIGNIFICANT CHANGE UP (ref 0.7–2)
LYMPHOCYTES # BLD AUTO: 0.3 K/UL — LOW (ref 1–3.3)
LYMPHOCYTES # BLD AUTO: 3 % — LOW (ref 13–44)
MCHC RBC-ENTMCNC: 20.1 PG — LOW (ref 27–34)
MCHC RBC-ENTMCNC: 22.2 PG — LOW (ref 27–34)
MCHC RBC-ENTMCNC: 30.6 GM/DL — LOW (ref 32–36)
MCHC RBC-ENTMCNC: 32.1 GM/DL — SIGNIFICANT CHANGE UP (ref 32–36)
MCV RBC AUTO: 65.5 FL — LOW (ref 80–100)
MCV RBC AUTO: 69.2 FL — LOW (ref 80–100)
MONOCYTES # BLD AUTO: 0.41 K/UL — SIGNIFICANT CHANGE UP (ref 0–0.9)
MONOCYTES NFR BLD AUTO: 4 % — SIGNIFICANT CHANGE UP (ref 2–14)
NEUTROPHILS # BLD AUTO: 9.35 K/UL — HIGH (ref 1.8–7.4)
NEUTROPHILS NFR BLD AUTO: 90 % — HIGH (ref 43–77)
NRBC # BLD: SIGNIFICANT CHANGE UP /100 WBCS (ref 0–0)
PLATELET # BLD AUTO: 138 K/UL — LOW (ref 150–400)
PLATELET # BLD AUTO: 164 K/UL — SIGNIFICANT CHANGE UP (ref 150–400)
POTASSIUM SERPL-MCNC: 2.1 MMOL/L — CRITICAL LOW (ref 3.5–5.3)
POTASSIUM SERPL-MCNC: 2.5 MMOL/L — CRITICAL LOW (ref 3.5–5.3)
POTASSIUM SERPL-SCNC: 2.1 MMOL/L — CRITICAL LOW (ref 3.5–5.3)
POTASSIUM SERPL-SCNC: 2.5 MMOL/L — CRITICAL LOW (ref 3.5–5.3)
PROT SERPL-MCNC: 4.8 GM/DL — LOW (ref 6–8.3)
PROTHROM AB SERPL-ACNC: 18.3 SEC — HIGH (ref 10.6–13.6)
RBC # BLD: 3.39 M/UL — LOW (ref 3.8–5.2)
RBC # BLD: 4.41 M/UL — SIGNIFICANT CHANGE UP (ref 3.8–5.2)
RBC # FLD: 28.9 % — HIGH (ref 10.3–14.5)
RBC # FLD: SIGNIFICANT CHANGE UP (ref 10.3–14.5)
SARS-COV-2 RNA SPEC QL NAA+PROBE: SIGNIFICANT CHANGE UP
SODIUM SERPL-SCNC: 133 MMOL/L — LOW (ref 135–145)
SODIUM SERPL-SCNC: 137 MMOL/L — SIGNIFICANT CHANGE UP (ref 135–145)
TROPONIN I SERPL-MCNC: 0.1 NG/ML — HIGH (ref 0.01–0.04)
TROPONIN I SERPL-MCNC: 0.11 NG/ML — HIGH (ref 0.01–0.04)
TROPONIN I SERPL-MCNC: 0.12 NG/ML — HIGH (ref 0.01–0.04)
WBC # BLD: 10.16 K/UL — SIGNIFICANT CHANGE UP (ref 3.8–10.5)
WBC # BLD: 17.58 K/UL — HIGH (ref 3.8–10.5)
WBC # FLD AUTO: 10.16 K/UL — SIGNIFICANT CHANGE UP (ref 3.8–10.5)
WBC # FLD AUTO: 17.58 K/UL — HIGH (ref 3.8–10.5)

## 2020-11-02 PROCEDURE — 84484 ASSAY OF TROPONIN QUANT: CPT

## 2020-11-02 PROCEDURE — 72125 CT NECK SPINE W/O DYE: CPT | Mod: 26

## 2020-11-02 PROCEDURE — 86920 COMPATIBILITY TEST SPIN: CPT

## 2020-11-02 PROCEDURE — 85027 COMPLETE CBC AUTOMATED: CPT

## 2020-11-02 PROCEDURE — 82652 VIT D 1 25-DIHYDROXY: CPT

## 2020-11-02 PROCEDURE — 80048 BASIC METABOLIC PNL TOTAL CA: CPT

## 2020-11-02 PROCEDURE — 71045 X-RAY EXAM CHEST 1 VIEW: CPT | Mod: 26

## 2020-11-02 PROCEDURE — P9016: CPT

## 2020-11-02 PROCEDURE — 82274 ASSAY TEST FOR BLOOD FECAL: CPT

## 2020-11-02 PROCEDURE — 36430 TRANSFUSION BLD/BLD COMPNT: CPT

## 2020-11-02 PROCEDURE — 99291 CRITICAL CARE FIRST HOUR: CPT

## 2020-11-02 PROCEDURE — 83970 ASSAY OF PARATHORMONE: CPT

## 2020-11-02 PROCEDURE — 84100 ASSAY OF PHOSPHORUS: CPT

## 2020-11-02 PROCEDURE — 93320 DOPPLER ECHO COMPLETE: CPT

## 2020-11-02 PROCEDURE — 82310 ASSAY OF CALCIUM: CPT

## 2020-11-02 PROCEDURE — 74177 CT ABD & PELVIS W/CONTRAST: CPT

## 2020-11-02 PROCEDURE — 83615 LACTATE (LD) (LDH) ENZYME: CPT

## 2020-11-02 PROCEDURE — 81001 URINALYSIS AUTO W/SCOPE: CPT

## 2020-11-02 PROCEDURE — 74177 CT ABD & PELVIS W/CONTRAST: CPT | Mod: 26

## 2020-11-02 PROCEDURE — 87040 BLOOD CULTURE FOR BACTERIA: CPT

## 2020-11-02 PROCEDURE — C9113: CPT

## 2020-11-02 PROCEDURE — 83010 ASSAY OF HAPTOGLOBIN QUANT: CPT

## 2020-11-02 PROCEDURE — 86769 SARS-COV-2 COVID-19 ANTIBODY: CPT

## 2020-11-02 PROCEDURE — 36415 COLL VENOUS BLD VENIPUNCTURE: CPT

## 2020-11-02 PROCEDURE — 83735 ASSAY OF MAGNESIUM: CPT

## 2020-11-02 PROCEDURE — 93325 DOPPLER ECHO COLOR FLOW MAPG: CPT

## 2020-11-02 PROCEDURE — 70450 CT HEAD/BRAIN W/O DYE: CPT | Mod: 26

## 2020-11-02 PROCEDURE — 93312 ECHO TRANSESOPHAGEAL: CPT

## 2020-11-02 PROCEDURE — 93306 TTE W/DOPPLER COMPLETE: CPT

## 2020-11-02 RX ORDER — SODIUM CHLORIDE 9 MG/ML
1100 INJECTION INTRAMUSCULAR; INTRAVENOUS; SUBCUTANEOUS ONCE
Refills: 0 | Status: COMPLETED | OUTPATIENT
Start: 2020-11-02 | End: 2020-11-02

## 2020-11-02 RX ORDER — POTASSIUM CHLORIDE 20 MEQ
40 PACKET (EA) ORAL ONCE
Refills: 0 | Status: COMPLETED | OUTPATIENT
Start: 2020-11-02 | End: 2020-11-02

## 2020-11-02 RX ORDER — CEFEPIME 1 G/1
2000 INJECTION, POWDER, FOR SOLUTION INTRAMUSCULAR; INTRAVENOUS ONCE
Refills: 0 | Status: COMPLETED | OUTPATIENT
Start: 2020-11-02 | End: 2020-11-02

## 2020-11-02 RX ORDER — PHENYLEPHRINE HYDROCHLORIDE 10 MG/ML
0.5 INJECTION INTRAVENOUS
Qty: 40 | Refills: 0 | Status: DISCONTINUED | OUTPATIENT
Start: 2020-11-02 | End: 2020-11-04

## 2020-11-02 RX ORDER — CALCIUM GLUCONATE 100 MG/ML
2 VIAL (ML) INTRAVENOUS ONCE
Refills: 0 | Status: COMPLETED | OUTPATIENT
Start: 2020-11-02 | End: 2020-11-02

## 2020-11-02 RX ORDER — POTASSIUM CHLORIDE 20 MEQ
10 PACKET (EA) ORAL
Refills: 0 | Status: COMPLETED | OUTPATIENT
Start: 2020-11-02 | End: 2020-11-02

## 2020-11-02 RX ORDER — ACETAMINOPHEN 500 MG
650 TABLET ORAL EVERY 6 HOURS
Refills: 0 | Status: DISCONTINUED | OUTPATIENT
Start: 2020-11-02 | End: 2020-11-04

## 2020-11-02 RX ORDER — LEVOTHYROXINE SODIUM 125 MCG
175 TABLET ORAL DAILY
Refills: 0 | Status: DISCONTINUED | OUTPATIENT
Start: 2020-11-02 | End: 2020-11-04

## 2020-11-02 RX ORDER — POTASSIUM CHLORIDE 20 MEQ
40 PACKET (EA) ORAL EVERY 4 HOURS
Refills: 0 | Status: COMPLETED | OUTPATIENT
Start: 2020-11-02 | End: 2020-11-03

## 2020-11-02 RX ORDER — SODIUM,POTASSIUM PHOSPHATES 278-250MG
1 POWDER IN PACKET (EA) ORAL
Refills: 0 | Status: COMPLETED | OUTPATIENT
Start: 2020-11-02 | End: 2020-11-03

## 2020-11-02 RX ORDER — PANTOPRAZOLE SODIUM 20 MG/1
40 TABLET, DELAYED RELEASE ORAL EVERY 12 HOURS
Refills: 0 | Status: DISCONTINUED | OUTPATIENT
Start: 2020-11-02 | End: 2020-11-04

## 2020-11-02 RX ORDER — SODIUM CHLORIDE 9 MG/ML
1000 INJECTION INTRAMUSCULAR; INTRAVENOUS; SUBCUTANEOUS ONCE
Refills: 0 | Status: COMPLETED | OUTPATIENT
Start: 2020-11-02 | End: 2020-11-02

## 2020-11-02 RX ORDER — LEVOTHYROXINE SODIUM 125 MCG
1 TABLET ORAL
Qty: 0 | Refills: 0 | DISCHARGE

## 2020-11-02 RX ORDER — SODIUM CHLORIDE 9 MG/ML
1000 INJECTION, SOLUTION INTRAVENOUS
Refills: 0 | Status: DISCONTINUED | OUTPATIENT
Start: 2020-11-02 | End: 2020-11-04

## 2020-11-02 RX ORDER — POTASSIUM CHLORIDE 20 MEQ
10 PACKET (EA) ORAL
Refills: 0 | Status: COMPLETED | OUTPATIENT
Start: 2020-11-02 | End: 2020-11-03

## 2020-11-02 RX ORDER — ACETAMINOPHEN 500 MG
1000 TABLET ORAL ONCE
Refills: 0 | Status: COMPLETED | OUTPATIENT
Start: 2020-11-02 | End: 2020-11-02

## 2020-11-02 RX ORDER — CHLORHEXIDINE GLUCONATE 213 G/1000ML
1 SOLUTION TOPICAL
Refills: 0 | Status: DISCONTINUED | OUTPATIENT
Start: 2020-11-02 | End: 2020-11-04

## 2020-11-02 RX ORDER — MAGNESIUM SULFATE 500 MG/ML
2 VIAL (ML) INJECTION ONCE
Refills: 0 | Status: COMPLETED | OUTPATIENT
Start: 2020-11-02 | End: 2020-11-02

## 2020-11-02 RX ORDER — PHENYLEPHRINE HYDROCHLORIDE 10 MG/ML
5 INJECTION INTRAVENOUS
Qty: 160 | Refills: 0 | Status: DISCONTINUED | OUTPATIENT
Start: 2020-11-02 | End: 2020-11-02

## 2020-11-02 RX ADMIN — Medication 175 MICROGRAM(S): at 15:14

## 2020-11-02 RX ADMIN — Medication 200 GRAM(S): at 21:52

## 2020-11-02 RX ADMIN — SODIUM CHLORIDE 1100 MILLILITER(S): 9 INJECTION INTRAMUSCULAR; INTRAVENOUS; SUBCUTANEOUS at 12:17

## 2020-11-02 RX ADMIN — Medication 100 MILLIEQUIVALENT(S): at 11:55

## 2020-11-02 RX ADMIN — Medication 100 MILLIEQUIVALENT(S): at 14:15

## 2020-11-02 RX ADMIN — CEFEPIME 100 MILLIGRAM(S): 1 INJECTION, POWDER, FOR SOLUTION INTRAMUSCULAR; INTRAVENOUS at 11:49

## 2020-11-02 RX ADMIN — Medication 1 PACKET(S): at 17:27

## 2020-11-02 RX ADMIN — Medication 1000 MILLIGRAM(S): at 11:17

## 2020-11-02 RX ADMIN — Medication 40 MILLIEQUIVALENT(S): at 12:02

## 2020-11-02 RX ADMIN — PHENYLEPHRINE HYDROCHLORIDE 12.8 MICROGRAM(S)/KG/MIN: 10 INJECTION INTRAVENOUS at 12:47

## 2020-11-02 RX ADMIN — Medication 50 GRAM(S): at 12:22

## 2020-11-02 RX ADMIN — Medication 40 MILLIEQUIVALENT(S): at 21:52

## 2020-11-02 RX ADMIN — SODIUM CHLORIDE 60 MILLILITER(S): 9 INJECTION, SOLUTION INTRAVENOUS at 19:57

## 2020-11-02 RX ADMIN — PANTOPRAZOLE SODIUM 40 MILLIGRAM(S): 20 TABLET, DELAYED RELEASE ORAL at 21:04

## 2020-11-02 RX ADMIN — SODIUM CHLORIDE 2200 MILLILITER(S): 9 INJECTION INTRAMUSCULAR; INTRAVENOUS; SUBCUTANEOUS at 11:47

## 2020-11-02 RX ADMIN — Medication 200 GRAM(S): at 14:15

## 2020-11-02 RX ADMIN — Medication 100 MILLIEQUIVALENT(S): at 15:10

## 2020-11-02 RX ADMIN — Medication 100 MILLIEQUIVALENT(S): at 22:47

## 2020-11-02 RX ADMIN — Medication 40 MILLIEQUIVALENT(S): at 17:14

## 2020-11-02 RX ADMIN — Medication 650 MILLIGRAM(S): at 17:25

## 2020-11-02 RX ADMIN — Medication 50 GRAM(S): at 15:22

## 2020-11-02 RX ADMIN — SODIUM CHLORIDE 1000 MILLILITER(S): 9 INJECTION INTRAMUSCULAR; INTRAVENOUS; SUBCUTANEOUS at 10:30

## 2020-11-02 RX ADMIN — CEFEPIME 2000 MILLIGRAM(S): 1 INJECTION, POWDER, FOR SOLUTION INTRAMUSCULAR; INTRAVENOUS at 12:19

## 2020-11-02 RX ADMIN — Medication 2 GRAM(S): at 13:22

## 2020-11-02 RX ADMIN — Medication 1000 MILLIGRAM(S): at 10:44

## 2020-11-02 RX ADMIN — PHENYLEPHRINE HYDROCHLORIDE 12.8 MICROGRAM(S)/KG/MIN: 10 INJECTION INTRAVENOUS at 21:04

## 2020-11-02 RX ADMIN — SODIUM CHLORIDE 2000 MILLILITER(S): 9 INJECTION INTRAMUSCULAR; INTRAVENOUS; SUBCUTANEOUS at 10:00

## 2020-11-02 RX ADMIN — Medication 100 MILLIEQUIVALENT(S): at 21:52

## 2020-11-02 NOTE — ED PROVIDER NOTE - PHYSICAL EXAMINATION
Ryan EDWARDS MD PGY3:   PHYSICAL EXAM:    GENERAL: NAD, well-developed  HEENT:  Atraumatic, Normocephalic  CHEST/LUNG: Chest rise equal bilaterally  HEART: Regular rate and rhythm  ABDOMEN: Soft, Nontender, Nondistended  EXTREMITIES:  2+ Peripheral Pulses.  PSYCH: A&Ox3  SKIN: No obvious rashes or lesions

## 2020-11-02 NOTE — H&P ADULT - HISTORY OF PRESENT ILLNESS
71 y/o F with PMHx of  severe aortic stenosis s/p TAVR on Oct , 2019 (only on aspirin),  gastric bypass 2004, hypothyroidism came to the ED BIBEMs from home s/p syncope episode. Pt report was on the shower when started to feel numbness and tingling in her toes and hands. She took a Gatorade, felt a little better and went back to the bathroom and passed out. She denies any, SOB, chest pain, lighehadened before and after the episode. She started to have watery diarrhea , approximately two episodes after the syncope episode. She reports during the last two weeks have been felling tired and weak. She denies hematuria, vaginal bleeding, hematochezia, fever, chills and any other symptoms.     At arrival to ED patient found  to be hypotensive with SBP 70-80 and DBP 40-50S. Labs showed hemoglobin 6.8, hypokalemia 2.1, hypocalcemia 5.5, lactate 1.1. Pt received 2.1 IVF and 1 PRBC with minimal improvement of blood pressure. Pt was started on Merlin-Synephrine 69 y/o F with PMHx of  severe aortic stenosis s/p TAVR on Oct , 2019 (only on aspirin),  gastric bypass 2004, hypothyroidism came to the ED BIBEMs from home s/p syncope episode. Pt report was on the shower when started to feel numbness and tingling in her toes and hands. She took a Gatorade, felt a little better and went back to the bathroom and passed out. She denies any, SOB, chest pain, lighehadened before and after the episode. She started to have watery diarrhea , approximately two episodes after the syncope episode. She reports during the last two weeks have been felling tired and weak. She denies hematuria, vaginal bleeding, hematochezia, fever, chills and any other symptoms.     At arrival to ED patient found  to be hypotensive with SBP 70-80 and DBP 40-50S. Labs showed hemoglobin 6.8, hypokalemia 2.1, hypocalcemia 5.5, lactate 1.1. Pt received 2.1 IVF and 1 PRBC with minimal improvement of blood pressure. Pt was started on Merlin-Synephrine and admitted to CCU. 69 y/o F with PMHx of  severe aortic stenosis s/p TAVR on Oct , 2019 (only on aspirin),  gastric bypass 2004, hypothyroidism came to the ED BIBEMs from home s/p syncope episode. Pt report was on the shower when started to feel numbness and tingling in her toes and hands. She took a Gatorade, felt a little better and went back to the bathroom and passed out. She denies any, SOB, chest pain, lighehadened before and after the episode. She started to have watery diarrhea , approximately two episodes after the syncope episode. She reports during the last two weeks have been felling tired and weak. She denies hematuria, vaginal bleeding, hematochezia, fever, chills and any other symptoms.     At arrival to ED patient found  to be hypotensive with SBP 70-80 and DBP 40-50S. Labs showed hemoglobin 6.8, hypokalemia 2.1, hypocalcemia 5.5, lactate 1.1. Pt received 2.1 IVF and 1 PRBC with minimal improvement of blood pressure. Pt was started on Merlin-Synephrine and admitted to ICU. 71 y/o F with PMHx of severe aortic stenosis s/p TAVR on Oct , 2019 (only on aspirin),  gastric bypass(on 2004), hypothyroidism came to the ED BIBEMs from home s/p a syncope episode. Pt reports was on the bathroom when started to feel numbness and tingling in her toes and hands. She took a gatorade, felt a little better and went back to the bathroom and passed out. She denies SOB, chest pain, lighehadenedness before and after the syncope episode. She started to have watery diarrhea , approximately two episodes after the syncope episode. She reports during the last two weeks have been felling tired and weak. She denies hematuria, vaginal bleeding, hematochezia, fever, chills and any other symptoms.     At arrival to ED patient found  to be hypotensive with SBP 70-80 and DBP 40-50S. Labs showed hemoglobin 6.8, hypokalemia 2.1, hypocalcemia 5.5, lactate 1.1. Pt received 2.1 IVF and 1 PRBC with minimal improvement of blood pressure. Pt was started on Merlin-Synephrine and admitted to ICU. 69 y/o F with PMHx of severe aortic stenosis s/p TAVR on Oct , 2019 (only on aspirin),  gastric bypass(on 2004), hypothyroidism came to the ED BIBEMs from home s/p a syncope episode. Pt reports was on the bathroom when started to feel numbness and tingling in her toes and hands. She took a gatorade, felt a little better and went back to the bathroom and passed out. She denies SOB, chest pain, lighehadenedness before and after the syncope episode. She started to have watery diarrhea, approximately two episodes after the syncope episode. She reports during the last two weeks have been felling tired and weak. She denies hematuria, vaginal bleeding, hematochezia, fever, chills, abdominal pain and any other symptoms.     At arrival to ED patient found  to be hypotensive with SBP 70-80s and DBP 40-50S. Labs showed hemoglobin 6.8, hypokalemia 2.1, hypocalcemia 5.5, lactate 1.1. Pt received 2.1L IVF and 1 PRBC with minimal improvement of blood pressure. Pt was started on Merlin-Synephrine and admitted to ICU for further management.

## 2020-11-02 NOTE — ED PROVIDER NOTE - CLINICAL SUMMARY MEDICAL DECISION MAKING FREE TEXT BOX
Ryan EDWARDS MD PGY3: Ryan EDWARDS MD PGY3: Patient here s/p syncopal episode with persistent weakness c/f infection in elderly patient, vs head trauma/ICH after fall. WIll obtain sepsis w/u with lytes. Will reassess after CTH.

## 2020-11-02 NOTE — ED ADULT NURSE NOTE - NSIMPLEMENTINTERV_GEN_ALL_ED
Implemented All Universal Safety Interventions:  [place] to call system. Call bell, personal items and telephone within reach. Instruct patient to call for assistance. Room bathroom lighting operational. Non-slip footwear when patient is off stretcher. Physically safe environment: no spills, clutter or unnecessary equipment. Stretcher in lowest position, wheels locked, appropriate side rails in place.

## 2020-11-02 NOTE — ED ADULT TRIAGE NOTE - CHIEF COMPLAINT QUOTE
pt p/w c/o syncopal event with head strike (no AC use) last night after taking a shower.  Pt did not want to come to the hospital last night.  This morning the patient reports generalized weakness, n/v/d, dizziness since last night.

## 2020-11-02 NOTE — ED PROVIDER NOTE - OBJECTIVE STATEMENT
Ryan EDWARDS MD PGY3: 70 F PMH former severe aortic stenosis (October 2020 replaced) only on aspirin no other AC, gastric bypass 2004, hypothyroidism, hx syncope and fall yesterday. Took shower and when she got out of shower, had lack of feeling in toes and hands. Took gatorade and felt better and got up to go to the bathroom, blacked out.  and son carried her into bedroom and she stayed there all night, c/b diarrhea. Currently hands feel better, feet still feel slightly numb. Lately states that she has not been feeling well assoc with lethargy and fatigue x 2 weeks. No dysuria, no cough. Was only able to walk today with assistance from EMS, but ultimately not successful.

## 2020-11-02 NOTE — ED PROVIDER NOTE - ATTENDING CONTRIBUTION TO CARE
I, Donovan Forrest MD, personally saw the patient with the resident, and completed the key components of the history and physical exam. I then discussed the management plan with the resident.

## 2020-11-02 NOTE — H&P ADULT - ATTENDING COMMENTS
PAtient seen and examined with the resident.  Additions and corrections made in the H & H where necessary

## 2020-11-02 NOTE — ED PROVIDER NOTE - CARE PLAN
Principal Discharge DX:	Hypotension  Secondary Diagnosis:	Anemia  Secondary Diagnosis:	Hypokalemia  Secondary Diagnosis:	Hypocalcemia  Secondary Diagnosis:	Hypomagnesemia  Secondary Diagnosis:	Diarrhea

## 2020-11-02 NOTE — ED ADULT NURSE NOTE - OBJECTIVE STATEMENT
Pt brought in by ambulance c/o weakness, low grade fever at home, and syncope at home. Pt states she got out of the bathroom yesterday, felt dizzy, and "blacked out" Pt c/o mild headache at this time. Pt alert and oriented x4. Pt states her blood pressure is normally 90 systolic. Pt denies chest pain, dizziness, shortness of breath in ED. Cardiac monitoring in progress, EKG done, safety maintained

## 2020-11-02 NOTE — ED PROVIDER NOTE - PROGRESS NOTE DETAILS
Vitaliy Ching for attending Dr. Forrest: Pt persistently hypotensive despite fluids. Will plan emergent release transfusion blood. Ryan EDWARDS MD PGY3: Spoke to ICU resident, will come down to evlauate patient for hypotension. Giving another liter of fluid, cefepime empricially, correcting various electrolyte abnormalities and will reassess. Patietn currently mentating well and comfortable. Vitaliy Ching for attending Dr. Forrest: 71 y/o female with a PMHx of aortic stenosis, eczema, hypothyroid, osteoarthritis presents to the ED s/p syncopal episode last night. Pt had a lot of diarrhea yesterday. Pt feels weak. On exam, pt is pale appearing. Abd soft nontender. Rectal Exam: Lot 175. Expiration 2/28/21. Brown stool. Heme negative. QC passed. MDM: Agree with resident assessment and plan. Ryan EDWARDS MD PGY3: ICU resident at bedside evaluating patient Ryan EDWARDS MD PGY3: ICU resident at bedside evaluating patient. BP improved to 92/57 (66) and continues to mentate well. Fluids, blood and potassium running through 3 IVs. Vitaliy Ching for attending Dr. Forrest: ICU evaluated pt. Pt to be admitted to CCU under Dr. De Luna. Pt to have Phenylephrine drip and CT abd pelvis.

## 2020-11-02 NOTE — H&P ADULT - NSHPPHYSICALEXAM_GEN_ALL_CORE
Vitals  T(F): 98.6 (11-02-20 @ 12:45), Max: 99.8 (11-02-20 @ 10:00)  HR: 59 (11-02-20 @ 13:01) (59 - 77)  BP: 88/62 (11-02-20 @ 13:01) (80/63 - 99/68)  RR: 16 (11-02-20 @ 13:01) (14 - 18)  SpO2: 100% (11-02-20 @ 13:01) (95% - 100%)    Physical Exam   Gen: NAD, comfortable, pale   HENT: atraumatic head and ears, no gross abnormalities of ears, mucous membranes moist, no oral lesions, neck supple without masses/goiter/lymphadenopathy  CV: RRR, nl s1/s2, 3/6 systolic murmur most prominent in  the 2nd right and left intercostal space   Pulm: nl respiratory effort, CTAB, no wheezes/crackles/rhonchi  Abd: normoactive bowel sounds in all 4 quadrants, soft, nontender, nondistended, no rebound, no guarding, no masses  Extremities: no pedal edema, pedal pulses palpable   Skin: nl warm and dry, no wounds   Neuro: A&Ox3, answering questions appropriately, PERRL, EOMI, face symmetric, sensation equal bilaterally in face, tongue midline, no dysarthria, 5/5 strength in upper and lower extremities bilaterally

## 2020-11-02 NOTE — H&P ADULT - ASSESSMENT
71 y/o F with PMHx of  severe aortic stenosis s/p TAVR on Oct , 2019 (only on aspirin),  gastric bypass 2004, hypothyroidism came to the ED BIBEMs from home s/p syncope episode and was found with hypotension, anemia , hypokalemia refractory to IV fluid required management with vasopressors.     Admit to ICU     #Hyptension secondary to suspected acute blood loss anemia   - BP  with SBP 80-90 and DBP 40-50   - H/H 6.8 (last Hgb 9.4 on sep, 2019)   - FOBT at ED negative   - Pt denies hematochezia, hematuria   - Pt with a hx of gastric bypass. Pt reports last colonoscopy was 3 years ago WNL   - s/p 2.1 L of IV fluid  with minimal improvement of BP   - s/p 1 PRBC transfusion  - Trend H/H   - No clear suspected source of bleeding identified but high suspicious for GI source   - Consult GI   - Protonix 40 mg IV BID   - Start Merlin-Synephrine  - f/u CT abdomen to rule out abdominal bleeding vs infection   - No source of infection identified at this moment   - Lactate 1.3, WBC WNL     #Hypokalemia   - K level 2.1  -Could be secondary to acute blood loss   -s/p KCL 10 mEq x3 and 40 mEq PO   - Monitor K levels     #Hypocalcemia   - Calcium 5.5 , corrected 7.4   - Pt had a hx of parathyroid surgery   - Check PTH   - Monitor and replace as needed     # Hypomagnesemia/ Hypophosphatemia   - Phos level 1.9   - Magnesium level 1.4   - Replace and monitor levels     # s/p Syncope   - Coud be secondary to hypotension secondary to blood loss   - Pt also with a hx of severe aortic stenosis s/p TVAR   - CT head/spine w/o acute abnormalities   - Check ECHO   - Fall precautions     # Elevated troponin   -  0.107>> 0.115   - Could be secondary to demand ischemia   - Trend troponin    # Hypothyroidism   -Continue Synthroid 175 mcg PO daily    #DVT  - SCDs       Case discussed with Dr De Luna    69 y/o F with PMHx of  severe aortic stenosis s/p TAVR on Oct , 2019 (only on aspirin),  gastric bypass 2004, hypothyroidism came to the ED BIBEMs from home s/p syncope episode and was found with hypotension, anemia , hypokalemia refractory to IV fluid required management with vasopressors.     Admit to ICU     #Hyptension secondary to suspected acute blood loss anemia   - BP  with SBP 80-90 and DBP 40-50   - H/H 6.8 (last Hgb 9.4 on sep, 2019)   - FOBT at ED negative -- will repeat  - Pt denies hematochezia, hematuria   - Pt with a hx of gastric bypass. Pt reports last colonoscopy was 3 years ago WNL   - s/p 2.1 L of IV fluid  with minimal improvement of BP   - s/p 1 PRBC transfusion.  Will give a second unit  - Trend H/H   - No clear suspected source of bleeding identified but high suspicious for GI source   - Consult GI   - Protonix 40 mg IV BID   - Start Merlin-Synephrine  - f/u CT abdomen to rule out abdominal bleeding vs infection--no RP bleed on CT  - No source of infection identified at this moment   - Lactate 1.3, WBC WNL     #Hypokalemia   - K level 2.1  -Could be secondary to acute blood loss   -s/p KCL 10 mEq x3 and 40 mEq PO   - Monitor K levels     #Hypocalcemia   - Calcium 5.5 , corrected 7.4   - Pt had a hx of parathyroid surgery   - Check PTH   - Monitor and replace as needed     # Hypomagnesemia/ Hypophosphatemia   - Phos level 1.9   - Magnesium level 1.4   - Replace and monitor levels     # s/p Syncope   - Coud be secondary to hypotension secondary to blood loss   - Pt also with a hx of severe aortic stenosis s/p TVAR   - CT head/spine w/o acute abnormalities   - Check ECHO   - Fall precautions     # Elevated troponin   -  0.107>> 0.115   - Could be secondary to demand ischemia   - Trend troponin, no ASA at this time    # Hypothyroidism   -Continue Synthroid 175 mcg PO daily    #DVT  - SCDs       HEM: Check haptoglobin level and LDH   69 y/o F with PMHx of  severe aortic stenosis s/p TAVR on Oct , 2019 (only on aspirin),  gastric bypass 2004, hypothyroidism came to the ED BIBEMs from home s/p syncope episode and was found with anemia , hypokalemia and hypotension refractory to IV fluid resuscitation requiring  management with vasopressors.     Admit to ICU     # Hypotension secondary to suspected acute blood loss anemia   -  At arrival to ED SBP 70-80 and DBP 40-50   - H/H 6.8 (last Hgb 9.4 on sep, 2019)   - FOBT at ED negative -- will repeat  - Pt denies hematochezia, hematuria   - Pt with a hx of gastric bypass. Pt reports last colonoscopy was 3 years ago WNL   - s/p 2.1 L of IV fluid  with minimal improvement of BP   - s/p 1 PRBC transfusion.  Will give a second unit  - Trend H/H   - No clear suspected source of bleeding identified but high suspicious for GI source   - GI consulted   - Start Protonix 40 mg IV BID   - Start Merlin-Synephrine and titrate as tolerated   - f/u CT abdomen to rule out abdominal bleeding vs infection--no RP bleed on CT  - No source of infection identified at this moment   - Lactate 1.3, WBC WNL     #Hypokalemia   - K level 2.1  -Could be secondary to acute blood loss   -s/p KCL 10 mEq x3 and 40 mEq PO   - Monitor K levels     #Hypocalcemia   - Calcium 5.5 , corrected 7.4   - Pt had a hx of parathyroid surgery   - Check PTH   - Monitor and replace as needed     # Hypomagnesemia/ Hypophosphatemia   - Phos level 1.9   - Magnesium level 1.4   - Replace and monitor levels     # s/p Syncope   - Coud be secondary to hypotension secondary to blood loss   - Pt also with a hx of severe aortic stenosis s/p TVAR   - CT head/spine w/o acute abnormalities   - Check ECHO   - Fall precautions     # Elevated troponin   -  0.107>> 0.115   - Could be secondary to demand ischemia   - Trend troponin, no ASA at this time    # Hypothyroidism   -Continue Synthroid 175 mcg PO daily    #DVT  - SCDs       HEM: Check haptoglobin level and LDH   71 y/o F with PMHx of  severe aortic stenosis s/p TAVR on Oct , 2019 (only on aspirin),  gastric bypass 2004, hypothyroidism came to the ED BIBEMs from home s/p syncope episode and was found with anemia , hypokalemia and hypotension refractory to IV fluid resuscitation requiring  management with vasopressors.     Admit to ICU     # Hypotension secondary to suspected acute blood loss anemia   - At arrival to ED SBP 70-80 and DBP 40-50   - H/H 6.8 (last Hgb 9.4 on sep, 2019)   - FOBT at ED negative -- will repeat  - Pt denies hematochezia, hematuria   - Pt with a hx of gastric bypass. Pt reports last colonoscopy was 3 years ago WNL   - s/p 2.1 L of IV fluid  with minimal improvement of BP   - s/p 1 PRBC transfusion.  Will give a second unit  - Trend H/H   - No clear suspected source of bleeding identified but high suspicious for GI source   - GI consulted   - Start Protonix 40 mg IV BID   - Start Merlin-Synephrine and titrate as tolerated   - CT abdomen -no RP bleed on CT  - Check haptoglobin level and LDH to rule out concomitant hemolysis in the setting of TAVR   - No source of infection identified at this moment   - Lactate 1.3, WBC WNL   - Monitor vital signs     #Hypokalemia   - K level 2.1  -Could be secondary to acute blood loss    -s/p KCL 10 mEq x3 and 40 mEq PO   - Monitor K levels     #Hypocalcemia   - Calcium 5.5 , corrected 7.4   - Check PTH   - Monitor and replace as needed     # Hypomagnesemia/ Hypophosphatemia   - Phos level 1.9   - Magnesium level 1.4   - Replace and monitor levels     # s/p Syncope   - Coud bedue to hypotension secondary to blood loss   - Pt also with a hx of severe aortic stenosis s/p TVAR   - CT head/spine w/o acute abnormalities   - Check ECHO   - Fall precautions     # Elevated troponin   -  0.107>> 0.115   - Could be secondary to demand ischemia   - Trend troponin, no ASA at this time    #Severe aortic stenosis s/p TAVR   - On oct, 2019   - Only on ASA at home     # Hypothyroidism   -Continue Synthroid 175 mcg PO daily    #DVT  - SCDs     Case discussed with Dr De Luna 69 y/o F with PMHx of  severe aortic stenosis s/p TAVR on Oct , 2019 (only on aspirin),  gastric bypass 2004, hypothyroidism came to the ED BIBEMs from home s/p syncope episode and was found with anemia , hypokalemia and hypotension refractory to IV fluid resuscitation requiring  management with vasopressors.     Admit to ICU     # Hypotension secondary to suspected acute blood loss anemia   - At arrival to ED SBP 70-80 and DBP 40-50   - H/H 6.8 (last Hgb 9.4 on sep, 2019)   - FOBT at ED negative -- will repeat  - Pt denies hematochezia, hematuria   - Pt with a hx of gastric bypass. Pt reports last colonoscopy was 3 years ago WNL   - s/p 2.1 L of IV fluid  with minimal improvement of BP   - s/p 1 PRBC transfusion.  Will give a second unit  - Trend H/H   - No clear suspected source of bleeding identified but high suspicious for GI source   - GI consulted   - Start Protonix 40 mg IV BID   - Start Merlin-Synephrine and titrate as tolerated   - CT abdomen -no RP bleed on CT  - Check haptoglobin level and LDH to rule out concomitant hemolysis in the setting of TAVR   - No source of infection identified at this moment   - Lactate 1.3, WBC WNL   - Monitor vital signs     #Hypokalemia   - K level 2.1  -Could be secondary to acute blood loss    -s/p KCL 10 mEq x3 and 40 mEq PO   - Monitor K levels     #Hypocalcemia   - Calcium 5.5 , corrected 7.4   - Check PTH   - Monitor and replace as needed     # Hypomagnesemia/ Hypophosphatemia   - Phos level 1.9   - Magnesium level 1.4   - Replace and monitor levels     # s/p Syncope   - Coud bedue to hypotension secondary to blood loss   - Pt also with a hx of severe aortic stenosis s/p TVAR   - CT head/spine w/o acute abnormalities   - Check ECHO   - Fall precautions     # Elevated troponin   -  0.107>> 0.115   - Could be secondary to demand ischemia   - Trend troponin, no ASA at this time    #Severe aortic stenosis s/p TAVR   - On oct, 2019   - Only on ASA at home  - On Metroprolol 25 mg PO at home. Hold due to hypotension      # Hypothyroidism   -Continue Synthroid 175 mcg PO daily    #DVT  - SCDs     Case discussed with Dr De Luna 71 y/o F with PMHx of  severe aortic stenosis s/p TAVR on Oct , 2019 (only on aspirin),  gastric bypass 2004, hypothyroidism came to the ED BIBEMs from home s/p syncope episode and was found with anemia , hypokalemia and hypotension refractory to IV fluid resuscitation requiring  management with vasopressors.     Admit to ICU     # Hypotension secondary to suspected acute blood loss anemia   - At arrival to ED SBP 70-80 and DBP 40-50   - H/H 6.8 (last Hgb 9.4 on sep, 2019)   - FOBT at ED negative -- will repeat  - Pt denies hematochezia, hematuria   - Pt with a hx of gastric bypass. Pt reports last colonoscopy was 3 years ago WNL   - s/p 2.1 L of IV fluid  with minimal improvement of BP   - s/p 1 PRBC transfusion.  Will give a second unit  - Trend H/H   - No clear suspected source of bleeding identified but high suspicious for GI source   - GI consulted   - Start Protonix 40 mg IV BID   - Start Merlin-Synephrine and titrate as tolerated   - CT abdomen -no RP bleed on CT  - Check haptoglobin level and LDH to rule out concomitant hemolysis in the setting of TAVR   - No source of infection identified at this moment   - Lactate 1.3, WBC WNL   - Monitor vital signs     #Hypokalemia   - K level 2.1  -Could be secondary to acute blood loss    -s/p KCL 10 mEq x3 and 40 mEq PO   - Monitor K levels     #Hypocalcemia   - Calcium 5.5 , corrected 7.4   - s/p Calcium gluconate 2g IV   - Check PTH   - Monitor and replace as needed     # Hypomagnesemia/ Hypophosphatemia   - Phos level 1.9   - Magnesium level 1.4   - Replace and monitor levels     # s/p Syncope   - Coud bedue to hypotension secondary to blood loss   - Pt also with a hx of severe aortic stenosis s/p TVAR   - CT head/spine w/o acute abnormalities   - Check ECHO   - Fall precautions     # Elevated troponin   -  0.107>> 0.115   - Could be secondary to demand ischemia   - Trend troponin, no ASA at this time    #Severe aortic stenosis s/p TAVR   - On oct, 2019   - Only on ASA at home  - On Metroprolol 25 mg PO at home. Hold due to hypotension      # Hypothyroidism   -Continue Synthroid 175 mcg PO daily    #DVT  - SCDs     Case discussed with Dr De Luna 71 y/o F with PMHx of  severe aortic stenosis s/p TAVR on Oct , 2019 (only on aspirin),  gastric bypass 2004, hypothyroidism came to the ED BIBEMs from home s/p syncope episode and was found with anemia , hypokalemia and hypotension refractory to IV fluid resuscitation requiring  management with vasopressors.     Admit to ICU     # Hypotension secondary to suspected acute blood loss anemia   - At arrival to ED SBP 70-80 and DBP 40-50   - H/H 6.8 (last Hgb 9.4 on sep, 2019)   - FOBT at ED negative -- will repeat  - Pt denies hematochezia, hematuria   - Pt with a hx of gastric bypass. Pt reports last colonoscopy was 3 years ago WNL   - s/p 2.1 L of IV fluid  with minimal improvement of BP   - s/p 1 PRBC transfusion.  Will give a second unit  - Trend H/H   - No clear suspected source of bleeding identified but high suspicious for GI source   - GI consulted   - Start Protonix 40 mg IV BID   - Start Merlin-Synephrine and titrate as tolerated   - CT abdomen -no RP bleed on CT  - Check haptoglobin level and LDH to rule out concomitant hemolysis in the setting of TAVR   - No source of infection identified at this moment   - Lactate 1.3, WBC WNL   - Monitor vital signs     #Hypokalemia   - K level 2.1  -Could be secondary to acute blood loss    -s/p KCL 10 mEq x3 and 40 mEq PO   - Monitor K levels     #Hypocalcemia   - Calcium 5.5 , corrected 7.4   - s/p Calcium gluconate 2g IV   - Check PTH   - Monitor and replace as needed     # Hypomagnesemia/ Hypophosphatemia   - Phos level 1.9   - Magnesium level 1.4   - Replace and monitor levels     # s/p Syncope   - Coud bedue to hypotension secondary to blood loss   - Pt also with a hx of severe aortic stenosis s/p TVAR   - CT head/spine w/o acute abnormalities   - Check ECHO   - Fall precautions     # Elevated troponin   -  0.107>> 0.115   - Could be secondary to demand ischemia   - Trend troponin, no ASA at this time    #Severe aortic stenosis s/p TAVR   - On oct, 2019   - Only on ASA at home  - On Metroprolol 25 mg PO at home. Hold due to hypotension      # Hypothyroidism   -Continue Synthroid 175 mcg PO daily    #EtOH abuse  - Pt use wine since 10 years ago. Last drink was 2 weeks ago.   - Monitor for signs of EtOH withdrawal     #DVT  - SCDs     Case discussed with Dr De Luna 69 y/o F with PMHx of  severe aortic stenosis s/p TAVR on Oct , 2019 (only on aspirin),  gastric bypass 2004, hypothyroidism came to the ED BIBEMs from home s/p syncope episode and was found with anemia , hypokalemia and hypotension refractory to IV fluid resuscitation requiring  management with vasopressors.     Admit to ICU     # Hypotension secondary to suspected acute blood loss anemia   - At arrival to ED SBP 70-80 and DBP 40-50   - H/H 6.8 (last Hgb 9.4 on sep, 2019)   - FOBT at ED negative -- will repeat  - Pt denies hematochezia, hematuria   - Pt with a hx of gastric bypass. Pt reports last colonoscopy was 3 years ago WNL   - s/p 2.1 L of IV fluid  with minimal improvement of BP   - s/p 1 PRBC transfusion.  Will give a second unit  - Trend H/H   - No clear suspected source of bleeding identified but high suspicious for GI source   - GI consulted   - Start Protonix 40 mg IV BID   - Start Merlin-Synephrine and titrate as tolerated   - CT abdomen -no RP bleed on CT  - Check haptoglobin level and LDH to rule out concomitant hemolysis in the setting of TAVR   - No source of infection identified at this moment   - Lactate 1.3, WBC WNL   - Monitor vital signs     #Hypokalemia   - K level 2.1  -Could be secondary to acute blood loss. Pt also reported some episodes of diarrhea.     -s/p KCL 10 mEq x3 and 40 mEq PO   - Monitor K levels     #Hypocalcemia   - Calcium 5.5 , corrected 7.4   - s/p Calcium gluconate 2g IV   - Check PTH   - Monitor and replace as needed     # Hypomagnesemia/ Hypophosphatemia   - Phos level 1.9   - Magnesium level 1.4   - Replace and monitor levels     # s/p Syncope   - Coud bedue to hypotension secondary to blood loss   - Pt also with a hx of severe aortic stenosis s/p TVAR   - CT head/spine w/o acute abnormalities   - Check ECHO   - Fall precautions     # Elevated troponin   -  0.107>> 0.115   - Could be secondary to demand ischemia   - Trend troponin, no ASA at this time    #Severe aortic stenosis s/p TAVR   - On oct, 2019   - Only on ASA at home  - On Metroprolol 25 mg PO at home. Hold due to hypotension      # Hypothyroidism   -Continue Synthroid 175 mcg PO daily    #EtOH abuse  - Pt use wine since 10 years ago. Last drink was 2 weeks ago.   - Monitor for signs of EtOH withdrawal     #DVT  - SCDs     Case discussed with Dr De Luna

## 2020-11-02 NOTE — ED PROVIDER NOTE - CONSTITUTIONAL, MLM
normal... Well appearing, awake, alert, oriented to person, place, time/situation, pale appearing and weak.

## 2020-11-03 LAB
-  STREPTOCOCCUS SP. (NOT GRP A, B OR S PNEUMONIAE): SIGNIFICANT CHANGE UP
ANION GAP SERPL CALC-SCNC: 5 MMOL/L — SIGNIFICANT CHANGE UP (ref 5–17)
APPEARANCE UR: CLEAR — SIGNIFICANT CHANGE UP
BILIRUB UR-MCNC: NEGATIVE — SIGNIFICANT CHANGE UP
BUN SERPL-MCNC: 12 MG/DL — SIGNIFICANT CHANGE UP (ref 7–23)
CALCIUM SERPL-MCNC: 5.3 MG/DL — CRITICAL LOW (ref 8.4–10.5)
CALCIUM SERPL-MCNC: 5.7 MG/DL — CRITICAL LOW (ref 8.5–10.1)
CHLORIDE SERPL-SCNC: 104 MMOL/L — SIGNIFICANT CHANGE UP (ref 96–108)
CO2 SERPL-SCNC: 33 MMOL/L — HIGH (ref 22–31)
COLOR SPEC: YELLOW — SIGNIFICANT CHANGE UP
CREAT SERPL-MCNC: 0.63 MG/DL — SIGNIFICANT CHANGE UP (ref 0.5–1.3)
DIFF PNL FLD: ABNORMAL
GLUCOSE SERPL-MCNC: 97 MG/DL — SIGNIFICANT CHANGE UP (ref 70–99)
GLUCOSE UR QL: NEGATIVE MG/DL — SIGNIFICANT CHANGE UP
GRAM STN FLD: SIGNIFICANT CHANGE UP
HAPTOGLOB SERPL-MCNC: 66 MG/DL — SIGNIFICANT CHANGE UP (ref 34–200)
HCT VFR BLD CALC: 30.1 % — LOW (ref 34.5–45)
HCT VFR BLD CALC: 30.8 % — LOW (ref 34.5–45)
HGB BLD-MCNC: 9.5 G/DL — LOW (ref 11.5–15.5)
HGB BLD-MCNC: 9.8 G/DL — LOW (ref 11.5–15.5)
KETONES UR-MCNC: NEGATIVE — SIGNIFICANT CHANGE UP
LEUKOCYTE ESTERASE UR-ACNC: ABNORMAL
MAGNESIUM SERPL-MCNC: 1.9 MG/DL — SIGNIFICANT CHANGE UP (ref 1.6–2.6)
MCHC RBC-ENTMCNC: 22 PG — LOW (ref 27–34)
MCHC RBC-ENTMCNC: 22.3 PG — LOW (ref 27–34)
MCHC RBC-ENTMCNC: 31.6 GM/DL — LOW (ref 32–36)
MCHC RBC-ENTMCNC: 31.8 GM/DL — LOW (ref 32–36)
MCV RBC AUTO: 69.8 FL — LOW (ref 80–100)
MCV RBC AUTO: 70.2 FL — LOW (ref 80–100)
METHOD TYPE: SIGNIFICANT CHANGE UP
NITRITE UR-MCNC: NEGATIVE — SIGNIFICANT CHANGE UP
PH UR: 6 — SIGNIFICANT CHANGE UP (ref 5–8)
PHOSPHATE SERPL-MCNC: 2.2 MG/DL — LOW (ref 2.5–4.5)
PLATELET # BLD AUTO: 102 K/UL — LOW (ref 150–400)
PLATELET # BLD AUTO: 120 K/UL — LOW (ref 150–400)
POTASSIUM SERPL-MCNC: 3.2 MMOL/L — LOW (ref 3.5–5.3)
POTASSIUM SERPL-SCNC: 3.2 MMOL/L — LOW (ref 3.5–5.3)
PROT UR-MCNC: NEGATIVE MG/DL — SIGNIFICANT CHANGE UP
PTH-INTACT FLD-MCNC: 298 PG/ML — HIGH (ref 15–65)
RBC # BLD: 4.31 M/UL — SIGNIFICANT CHANGE UP (ref 3.8–5.2)
RBC # BLD: 4.39 M/UL — SIGNIFICANT CHANGE UP (ref 3.8–5.2)
RBC # FLD: SIGNIFICANT CHANGE UP (ref 10.3–14.5)
RBC # FLD: SIGNIFICANT CHANGE UP (ref 10.3–14.5)
SARS-COV-2 IGG SERPL QL IA: NEGATIVE — SIGNIFICANT CHANGE UP
SARS-COV-2 IGM SERPL IA-ACNC: 0.1 INDEX — SIGNIFICANT CHANGE UP
SODIUM SERPL-SCNC: 142 MMOL/L — SIGNIFICANT CHANGE UP (ref 135–145)
SP GR SPEC: 1 — LOW (ref 1.01–1.02)
SPECIMEN SOURCE: SIGNIFICANT CHANGE UP
SPECIMEN SOURCE: SIGNIFICANT CHANGE UP
UROBILINOGEN FLD QL: NEGATIVE MG/DL — SIGNIFICANT CHANGE UP
WBC # BLD: 11.13 K/UL — HIGH (ref 3.8–10.5)
WBC # BLD: 17.31 K/UL — HIGH (ref 3.8–10.5)
WBC # FLD AUTO: 11.13 K/UL — HIGH (ref 3.8–10.5)
WBC # FLD AUTO: 17.31 K/UL — HIGH (ref 3.8–10.5)

## 2020-11-03 PROCEDURE — 99291 CRITICAL CARE FIRST HOUR: CPT

## 2020-11-03 PROCEDURE — 93306 TTE W/DOPPLER COMPLETE: CPT | Mod: 26

## 2020-11-03 RX ORDER — PIPERACILLIN AND TAZOBACTAM 4; .5 G/20ML; G/20ML
3.38 INJECTION, POWDER, LYOPHILIZED, FOR SOLUTION INTRAVENOUS EVERY 8 HOURS
Refills: 0 | Status: DISCONTINUED | OUTPATIENT
Start: 2020-11-03 | End: 2020-11-04

## 2020-11-03 RX ORDER — VANCOMYCIN HCL 1 G
1000 VIAL (EA) INTRAVENOUS EVERY 12 HOURS
Refills: 0 | Status: DISCONTINUED | OUTPATIENT
Start: 2020-11-03 | End: 2020-11-04

## 2020-11-03 RX ORDER — MIDODRINE HYDROCHLORIDE 2.5 MG/1
5 TABLET ORAL EVERY 8 HOURS
Refills: 0 | Status: DISCONTINUED | OUTPATIENT
Start: 2020-11-03 | End: 2020-11-03

## 2020-11-03 RX ORDER — MIDODRINE HYDROCHLORIDE 2.5 MG/1
10 TABLET ORAL EVERY 8 HOURS
Refills: 0 | Status: DISCONTINUED | OUTPATIENT
Start: 2020-11-03 | End: 2020-11-04

## 2020-11-03 RX ORDER — POTASSIUM PHOSPHATE, MONOBASIC POTASSIUM PHOSPHATE, DIBASIC 236; 224 MG/ML; MG/ML
15 INJECTION, SOLUTION INTRAVENOUS ONCE
Refills: 0 | Status: COMPLETED | OUTPATIENT
Start: 2020-11-03 | End: 2020-11-03

## 2020-11-03 RX ORDER — PIPERACILLIN AND TAZOBACTAM 4; .5 G/20ML; G/20ML
3.38 INJECTION, POWDER, LYOPHILIZED, FOR SOLUTION INTRAVENOUS ONCE
Refills: 0 | Status: COMPLETED | OUTPATIENT
Start: 2020-11-03 | End: 2020-11-03

## 2020-11-03 RX ADMIN — MIDODRINE HYDROCHLORIDE 5 MILLIGRAM(S): 2.5 TABLET ORAL at 12:35

## 2020-11-03 RX ADMIN — Medication 650 MILLIGRAM(S): at 09:33

## 2020-11-03 RX ADMIN — PIPERACILLIN AND TAZOBACTAM 200 GRAM(S): 4; .5 INJECTION, POWDER, LYOPHILIZED, FOR SOLUTION INTRAVENOUS at 04:10

## 2020-11-03 RX ADMIN — Medication 40 MILLIEQUIVALENT(S): at 03:03

## 2020-11-03 RX ADMIN — PANTOPRAZOLE SODIUM 40 MILLIGRAM(S): 20 TABLET, DELAYED RELEASE ORAL at 09:33

## 2020-11-03 RX ADMIN — Medication 175 MICROGRAM(S): at 06:23

## 2020-11-03 RX ADMIN — MIDODRINE HYDROCHLORIDE 10 MILLIGRAM(S): 2.5 TABLET ORAL at 21:16

## 2020-11-03 RX ADMIN — POTASSIUM PHOSPHATE, MONOBASIC POTASSIUM PHOSPHATE, DIBASIC 62.5 MILLIMOLE(S): 236; 224 INJECTION, SOLUTION INTRAVENOUS at 08:33

## 2020-11-03 RX ADMIN — Medication 1 PACKET(S): at 06:23

## 2020-11-03 RX ADMIN — PIPERACILLIN AND TAZOBACTAM 25 GRAM(S): 4; .5 INJECTION, POWDER, LYOPHILIZED, FOR SOLUTION INTRAVENOUS at 21:16

## 2020-11-03 RX ADMIN — CHLORHEXIDINE GLUCONATE 1 APPLICATION(S): 213 SOLUTION TOPICAL at 04:57

## 2020-11-03 RX ADMIN — PHENYLEPHRINE HYDROCHLORIDE 12.8 MICROGRAM(S)/KG/MIN: 10 INJECTION INTRAVENOUS at 03:03

## 2020-11-03 RX ADMIN — PIPERACILLIN AND TAZOBACTAM 25 GRAM(S): 4; .5 INJECTION, POWDER, LYOPHILIZED, FOR SOLUTION INTRAVENOUS at 13:03

## 2020-11-03 RX ADMIN — SODIUM CHLORIDE 60 MILLILITER(S): 9 INJECTION, SOLUTION INTRAVENOUS at 12:55

## 2020-11-03 RX ADMIN — Medication 1 PACKET(S): at 00:11

## 2020-11-03 RX ADMIN — Medication 250 MILLIGRAM(S): at 21:16

## 2020-11-03 RX ADMIN — Medication 100 MILLIEQUIVALENT(S): at 00:11

## 2020-11-03 RX ADMIN — Medication 250 MILLIGRAM(S): at 09:33

## 2020-11-03 RX ADMIN — Medication 1 PACKET(S): at 12:34

## 2020-11-03 RX ADMIN — PANTOPRAZOLE SODIUM 40 MILLIGRAM(S): 20 TABLET, DELAYED RELEASE ORAL at 21:16

## 2020-11-03 RX ADMIN — Medication 250 MILLIGRAM(S): at 04:09

## 2020-11-03 RX ADMIN — Medication 650 MILLIGRAM(S): at 08:33

## 2020-11-03 NOTE — PROGRESS NOTE ADULT - ASSESSMENT
IMP:    69 y/o female with PMHx of severe aortic stenosis s/p TAVR on Oct , 2019 (only on aspirin),  gastric bypass(on 2004) and hypothyroidism presents with 2 week h/o weakness and fatigue to be anemic and + strep sepsis and in septic shock    High risk for acute decompensation and deterioration including death     Plan:    Cont with IV Vanco and Pip/michelet (2)  Follow up Cxs  Actively titrate pressors to Keep MAP > 60  Start Midodrine 5 q8  Cards eval--may need JAVIER if TTE unrevealing  GI eval  NPO for now  IVF  Cont with synthroid--hold BBx  DVT prophy--SCD    ICU care-- d/w ICU staff on multi disciplinary rounds and pt and dr brandt-- All concerns addressed including but not limited to diagnosis, treatment plan and overall prognosis

## 2020-11-03 NOTE — CONSULT NOTE ADULT - SUBJECTIVE AND OBJECTIVE BOX
Patient is a 70y old  Female who presents with a chief complaint of hypotension, acute blood loss (2020 11:34)      HPI:  71 y/o F with PMHx of severe aortic stenosis s/p TAVR on Oct , 2019 (only on aspirin),  gastric bypass(on ), hypothyroidism came to the ED BIBEMs from home s/p a syncope episode. Pt reports was on the bathroom when started to feel numbness and tingling in her toes and hands. She took a gatorade, felt a little better and went back to the bathroom and passed out. She denies SOB, chest pain, lighehadenedness before and after the syncope episode. She started to have watery diarrhea, approximately two episodes after the syncope episode. She reports during the last two weeks have been felling tired and weak. She denies hematuria, vaginal bleeding, hematochezia, fever, chills, abdominal pain and any other symptoms.     At arrival to ED patient found  to be hypotensive with SBP 70-80s and DBP 40-50S. Labs showed hemoglobin 6.8, hypokalemia 2.1, hypocalcemia 5.5, lactate 1.1. Pt received 2.1L IVF and 1 PRBC with minimal improvement of blood pressure. Pt was started on Merlin-Synephrine and admitted to ICU for further management. (2020 13:24)  11/3 Cardiology.  70 year old female with TAVR approx one year ago history of etoh abuse, gastric bypass  admitted with 2 weeks weakness and fatigue with acute numbness of hands and feet on  and then syncopal episode. Patient febrile and anemic on admission. Blood cultures now positive for strep, unclassified.    PAST MEDICAL & SURGICAL HISTORY:  Aortic stenosis    Eczema    Osteoarthritis  left knee arthroscopy     Hypothyroidism    Vocal Cord Polyp Removed   Denies current vocal cord polyps    Gastric Bypass  for Obesity     Abdominal cyst removed     S/P left Bunionectomy     Parotid Mass Removed      Section   d/t failure to progress and Repeat     History of Appendectomy        PREVIOUS DIAGNOSTIC TESTING:      ECHO  FINDINGS:    STRESS  FINDINGS:    CATHETERIZATION  FINDINGS:    MEDICATIONS  (STANDING):  chlorhexidine 4% Liquid 1 Application(s) Topical <User Schedule>  lactated ringers. 1000 milliLiter(s) (60 mL/Hr) IV Continuous <Continuous>  levothyroxine 175 MICROGram(s) Oral daily  midodrine 10 milliGRAM(s) Oral every 8 hours  pantoprazole  Injectable 40 milliGRAM(s) IV Push every 12 hours  phenylephrine    Infusion 0.5 MICROgram(s)/kG/Min (12.8 mL/Hr) IV Continuous <Continuous>  piperacillin/tazobactam IVPB.. 3.375 Gram(s) IV Intermittent every 8 hours  vancomycin  IVPB 1000 milliGRAM(s) IV Intermittent every 12 hours    MEDICATIONS  (PRN):  acetaminophen   Tablet .. 650 milliGRAM(s) Oral every 6 hours PRN Temp greater or equal to 38C (100.4F), Mild Pain (1 - 3)      FAMILY HISTORY:  No pertinent family history in first degree relatives        SOCIAL HISTORY:  ***    REVIEW OF SYSTEM:  Pertinent items are noted in HPI.  Constitutional  Eyes:    Ears, nose, mouth,   throat, and face:    Neck:   Respiratory:   and wheezing  Cardiovascular:    Gastrointestinal:  Genitourinary:     Hematologic/lymphatic:   Musculoskeletal:   Neurological:   Behavioral/Psych:        Vital Signs Last 24 Hrs  T(C): 36.1 (2020 16:00), Max: 37.9 (2020 20:00)  T(F): 97 (2020 16:00), Max: 100.3 (2020 20:00)  HR: 53 (2020 16:00) (43 - 76)  BP: 90/50 (2020 16:00) (78/42 - 132/61)  BP(mean): 60 (2020 16:00) (51 - 89)  RR: 14 (2020 16:00) (9 - 19)  SpO2: 98% (2020 16:00) (94% - 100%)    I&O's Summary    2020 07:01  -  2020 07:00  --------------------------------------------------------  IN: 2727 mL / OUT: 1250 mL / NET: 1477 mL    2020 07:01  -  2020 17:10  --------------------------------------------------------  IN: 1000 mL / OUT: 1000 mL / NET: 0 mL      PHYSICAL EXAM  General Appearance: cooperative, no acute distress,   HEENT: PERRL, conjunctiva clear, EOM's intact, non injected pharynx, no exudate    Neck: Supple, , no adenopathy, thyroid: not enlarged, no carotid bruit or JVD  Back: Symmetric, no  tenderness,no soft tissue tenderness  Lungs: Clear to auscultation bilaterally,no adventitious breath sounds, normal   expiratory phase  Heart: Regular rate and rhythm, S1, S2 normal, no murmur,   Abdomen: Soft, non-tender, bowel sounds active , no hepatosplenomegaly  Extremities: no cyanosis or edema, no joint swelling  Skin: Skin color, texture normal, no rashes   Neurologic: Alert and oriented X3 , cranial nerves intact, sensory and motor normal,        INTERPRETATION OF TELEMETRY:    ECG:        LABS:                          9.5    11.13 )-----------( 102      ( 2020 06:25 )             30.1     11-03    142  |  104  |  12  ----------------------------<  97  3.2<L>   |  33<H>  |  0.63    Ca    5.7<LL>      2020 06:25  Phos  2.2     -  Mg     1.9         TPro  4.8<L>  /  Alb  1.6<L>  /  TBili  0.4  /  DBili  x   /  AST  32  /  ALT  13  /  AlkPhos  85  11-02    CARDIAC MARKERS ( 2020 20:35 )  0.097 ng/mL / x     / x     / x     / x      CARDIAC MARKERS ( 2020 12:27 )  0.115 ng/mL / x     / x     / x     / x      CARDIAC MARKERS ( 2020 10:19 )  0.107 ng/mL / x     / x     / x     / x              PT/INR - ( 2020 10:19 )   PT: 18.3 sec;   INR: 1.61 ratio         PTT - ( 2020 10:19 )  PTT:30.2 sec  Urinalysis Basic - ( 2020 00:52 )    Color: Yellow / Appearance: Clear / S.005 / pH: x  Gluc: x / Ketone: Negative  / Bili: Negative / Urobili: Negative mg/dL   Blood: x / Protein: Negative mg/dL / Nitrite: Negative   Leuk Esterase: Small / RBC: Negative /HPF / WBC 6-10   Sq Epi: x / Non Sq Epi: Moderate / Bacteria: Moderate            RADIOLOGY & ADDITIONAL STUDIES:    IMPRESSION:    PLAN:

## 2020-11-03 NOTE — CONSULT NOTE ADULT - ASSESSMENT
70F with AS s/p TAVR, possible EtOH liver disease, presenting with Strep sepsis and severe microcytic anemia. No active GI bleeding noted. Anemia may be related to KLAUS in setting of gastric bypass but cannot r/o chronic GI blood loss.    Recommend:  -continued tx of sepsis  -follow cultures for speciation to see if colonic source of bacteremia   -trend CBC  -monitor INR/plts/LFTs  -would hold off on endoscopic evaluation unless grows typical colonic bacteria or unless active bleeding develops  -d/w Dr. La  -will follow

## 2020-11-03 NOTE — PROGRESS NOTE ADULT - SUBJECTIVE AND OBJECTIVE BOX
[facility]  ICU # 1    CC:  Hypotension     HPI:    69 y/o female with PMHx of severe aortic stenosis s/p TAVR on Oct , 2019 (only on aspirin),  gastric bypass(on ), hypothyroidism came to the ED BIBEMs from home s/p a syncope episode. Pt reports was on the bathroom when started to feel numbness and tingling in her toes and hands. She took a gatorade, felt a little better and went back to the bathroom and passed out.   At arrival to ED patient found  to be hypotensive with SBP 70-80s and DBP 40-50S. Labs showed hemoglobin 6.8, hypokalemia 2.1, hypocalcemia 5.5, lactate 1.1. Pt received 2.1L IVF and 1 PRBC with minimal improvement of blood pressure. Pt was started on Merlin-Synephrine and admitted to ICU for further management    11/3:  Case d/w Dr casas.  Pt seen and examined in ICU--had recent colon with Dr hernandez--reportedly negative.  Has been feeling weak over last 2 weeks.  No wt loss.  Stable PO intake.  Low grade temp overnight--Bcx sent-- + for strep species.  Started on IV Vanco and Pip/michelet    PMH:  As above.     PSH:  As above.     FH: Non Contributory other than those listed in HPI    Social History:  As above     MEDICATIONS  (STANDING):  chlorhexidine 4% Liquid 1 Application(s) Topical <User Schedule>  lactated ringers. 1000 milliLiter(s) (60 mL/Hr) IV Continuous <Continuous>  levothyroxine 175 MICROGram(s) Oral daily  pantoprazole  Injectable 40 milliGRAM(s) IV Push every 12 hours  phenylephrine    Infusion 0.5 MICROgram(s)/kG/Min (12.8 mL/Hr) IV Continuous <Continuous>  piperacillin/tazobactam IVPB.. 3.375 Gram(s) IV Intermittent every 8 hours  potassium phosphate / sodium phosphate Powder (PHOS-NaK) 1 Packet(s) Oral four times a day  vancomycin  IVPB 1000 milliGRAM(s) IV Intermittent every 12 hours    MEDICATIONS  (PRN):  acetaminophen   Tablet .. 650 milliGRAM(s) Oral every 6 hours PRN Temp greater or equal to 38C (100.4F), Mild Pain (1 - 3)      Allergies: NKDA    ROS:  SEE BELOW    Height (cm): 175.3 ( @ 13:55)  Weight (kg): 79 ( @ 13:55)  BMI (kg/m2): 25.7 ( @ 13:55)    ICU Vital Signs Last 24 Hrs  T(C): 36.4 (2020 08:00), Max: 37.9 (2020 20:00)  T(F): 97.5 (2020 08:00), Max: 100.3 (2020 20:00)  HR: 58 (2020 11:00) (43 - 78)  BP: 98/50 (2020 11:) (85/55 - 132/61)  BP(mean): 62 (:) (36 - 89)  ABP: --  ABP(mean): --  RR: 12 (2020 11:00) (9 - 19)  SpO2: 98% (2020 11:) (94% - 100%)          I&O's Summary    2020 07:01  -  2020 07:00  --------------------------------------------------------  IN: 2727 mL / OUT: 1250 mL / NET: 1477 mL    2020 07:01  -  2020 11:35  --------------------------------------------------------  IN: 500 mL / OUT: 400 mL / NET: 100 mL        Physical Exam:  SEE BELOW                          9.5    11.13 )-----------( 102      ( 2020 06:25 )             30.1       1103    142  |  104  |  12  ----------------------------<  97  3.2<L>   |  33<H>  |  0.63    Ca    5.7<LL>      2020 06:25  Phos  2.2     1103  Mg     1.9         TPro  4.8<L>  /  Alb  1.6<L>  /  TBili  0.4  /  DBili  x   /  AST  32  /  ALT  13  /  AlkPhos  85        CARDIAC MARKERS ( 2020 20:35 )  0.097 ng/mL / x     / x     / x     / x      CARDIAC MARKERS ( 2020 12:27 )  0.115 ng/mL / x     / x     / x     / x      CARDIAC MARKERS ( 2020 10:19 )  0.107 ng/mL / x     / x     / x     / x                Urinalysis Basic - ( 2020 00:52 )    Color: Yellow / Appearance: Clear / S.005 / pH: x  Gluc: x / Ketone: Negative  / Bili: Negative / Urobili: Negative mg/dL   Blood: x / Protein: Negative mg/dL / Nitrite: Negative   Leuk Esterase: Small / RBC: Negative /HPF / WBC 6-10   Sq Epi: x / Non Sq Epi: Moderate / Bacteria: Moderate        DVT Prophylaxis:                                                            Contraindication:     Advanced Directives:    Discussed with:    Visit Information:  Time spent excluding procedure:  30 cc mins    ** Time is exclusive of billed procedures and/or teaching and/or routine family updates.

## 2020-11-03 NOTE — CONSULT NOTE ADULT - ASSESSMENT
71 y/o Female with h/o severe aortic stenosis s/p TAVR on Oct , 2019 (only on aspirin), gastric bypass(on 2004), hypothyroidism was admitted on 11/2 for increased weakness and syncope episode. Pt reports was on the bathroom when started to feel numbness and tingling in her toes and hands. She took a gatorade, felt a little better and went back to the bathroom and passed out. She denies SOB, chest pain, lighehadenedness before and after the syncope episode. She started to have watery diarrhea, approximately two episodes after the syncope episode. She reports during the last two weeks have been felling tired and weak. She denies fever, chills at home. In ER she was found hypotensive and with low grade fever. She received vancomycin IV and zosyn.     1. Low grade fever. Sepsis with strep spp. Possible subacute bacterial endocarditis. AS s/p TAVR. Diarrhea. Possible acute enterocolitis.   -leukocytosis  -obtain BC x 2   71 y/o Female with h/o severe aortic stenosis s/p TAVR on Oct , 2019 (only on aspirin), gastric bypass(on 2004), hypothyroidism was admitted on 11/2 for increased weakness and syncope episode. Pt reports was on the bathroom when started to feel numbness and tingling in her toes and hands. She took a gatorade, felt a little better and went back to the bathroom and passed out. She denies SOB, chest pain, lighehadenedness before and after the syncope episode. She started to have watery diarrhea, approximately two episodes after the syncope episode. She reports during the last two weeks have been felling tired and weak. She denies fever, chills at home. In ER she was found hypotensive and with low grade fever. She received vancomycin IV and zosyn.     1. Low grade fever. Sepsis with strep spp. Possible subacute bacterial endocarditis. AS s/p TAVR.   -leukocytosis  -obtain BC x 2  -agree with zosyn 3.375 gm IV q8h and vancomycin 1 gm IV q12h  -reason for abx use and side effects reviewed with patient; monitor BMP and vancomycin trough levels   -obtain cardiac Echo  -consider cardiology evaluation  -no further diarrhea to suggest enteritis  -old chart reviewed to assess prior cultures  -monitor temps  -f/u CBC  -supportive care  2. Other issues:   -care per medicine

## 2020-11-03 NOTE — CONSULT NOTE ADULT - SUBJECTIVE AND OBJECTIVE BOX
Patient is a 70y old  Female who presents with a chief complaint of hypotension, acute blood loss     HPI:  71 y/o Female with h/o severe aortic stenosis s/p TAVR on Oct , 2019 (only on aspirin), gastric bypass(on ), hypothyroidism was admitted on  for increased weakness and syncope episode. Pt reports was on the bathroom when started to feel numbness and tingling in her toes and hands. She took a gatorade, felt a little better and went back to the bathroom and passed out. She denies SOB, chest pain, lighehadenedness before and after the syncope episode. She started to have watery diarrhea, approximately two episodes after the syncope episode. She reports during the last two weeks have been felling tired and weak. She denies fever, chills at home. In ER she was found hypotensive and with low grade fever. She received vancomycin IV and zosyn.        PMH: as above  PSH: as above  Meds: per reconciliation sheet, noted below  MEDICATIONS  (STANDING):  chlorhexidine 4% Liquid 1 Application(s) Topical <User Schedule>  lactated ringers. 1000 milliLiter(s) (60 mL/Hr) IV Continuous <Continuous>  levothyroxine 175 MICROGram(s) Oral daily  pantoprazole  Injectable 40 milliGRAM(s) IV Push every 12 hours  phenylephrine    Infusion 0.5 MICROgram(s)/kG/Min (12.8 mL/Hr) IV Continuous <Continuous>  piperacillin/tazobactam IVPB.. 3.375 Gram(s) IV Intermittent every 8 hours  potassium phosphate / sodium phosphate Powder (PHOS-NaK) 1 Packet(s) Oral four times a day  vancomycin  IVPB 1000 milliGRAM(s) IV Intermittent every 12 hours    MEDICATIONS  (PRN):  acetaminophen   Tablet .. 650 milliGRAM(s) Oral every 6 hours PRN Temp greater or equal to 38C (100.4F), Mild Pain (1 - 3)    Allergies    No Known Allergies    Intolerances      Social: no smoking, no alcohol, no illegal drugs; no recent travel, no exposure to TB  FAMILY HISTORY:  No pertinent family history in first degree relatives      no history of premature cardiovascular disease in first degree relatives    ROS: the patient denies fever, no chills, no HA, no seizures, had dizziness, no sore throat, no nasal congestion, no blurry vision, no CP, no palpitations, no SOB, no cough, no abdominal pain, had diarrhea, no N/V, no dysuria, no leg pain, no claudication, no rash, no joint aches, no rectal pain or bleeding, no night sweats; had syncopal episode  All other systems reviewed and are negative    Vital Signs Last 24 Hrs  T(C): 36.4 (2020 08:00), Max: 37.9 (2020 20:00)  T(F): 97.5 (2020 08:00), Max: 100.3 (2020 20:00)  HR: 48 (2020 08:30) (43 - 78)  BP: 120/59 (2020 08:30) (80/63 - 132/61)  BP(mean): 74 (2020 08:30) (36 - 89)  RR: 13 (2020 08:30) (9 - 19)  SpO2: 98% (2020 08:30) (94% - 100%)  Daily Height in cm: 175.26 (2020 13:55)    Daily     PE:    Constitutional:  No acute distress  HEENT: NC/AT, EOMI, PERRLA, conjunctivae clear; ears and nose atraumatic; pharynx benign  Neck: supple; thyroid not palpable  Back: no tenderness  Respiratory: respiratory effort normal; few crackles at bases  Cardiovascular: S1S2 regular, no murmurs  Abdomen: soft, not tender, not distended, positive BS; no liver or spleen organomegaly  Genitourinary: no suprapubic tenderness  Lymphatic: no LN palpable  Musculoskeletal: no muscle tenderness, no joint swelling or tenderness  Extremities: no pedal edema  Neurological/ Psychiatric: AxOx3, judgement and insight normal; moving all extremities  Skin: no rashes; no palpable lesions    Labs: all available labs reviewed                        9.8    17.31 )-----------( 120      ( 2020 00:52 )             30.8     11-03    142  |  104  |  12  ----------------------------<  97  3.2<L>   |  33<H>  |  0.63    Ca    5.7<LL>      2020 06:25  Phos  2.2     11-03  Mg     1.9         TPro  4.8<L>  /  Alb  1.6<L>  /  TBili  0.4  /  DBili  x   /  AST  32  /  ALT  13  /  AlkPhos  85       LIVER FUNCTIONS - ( 2020 10:19 )  Alb: 1.6 g/dL / Pro: 4.8 gm/dL / ALK PHOS: 85 U/L / ALT: 13 U/L / AST: 32 U/L / GGT: x           Urinalysis Basic - ( 2020 00:52 )    Color: Yellow / Appearance: Clear / S.005 / pH: x  Gluc: x / Ketone: Negative  / Bili: Negative / Urobili: Negative mg/dL   Blood: x / Protein: Negative mg/dL / Nitrite: Negative   Leuk Esterase: Small / RBC: Negative /HPF / WBC 6-10   Sq Epi: x / Non Sq Epi: Moderate / Bacteria: Moderate      Culture - Blood (collected 2020 10:21)  Source: .Blood None  Gram Stain (2020 04:34):    Growth in anaerobic bottle:    Gram positive cocci in pairs    Growth in aerobic bottle:    Gram positive cocci in pairs  Preliminary Report (2020 04:34):    Growth in anaerobic bottle:    Gram positive cocci in pairs    Growth in aerobic bottle:    Gram positive cocci in pairs  Organism: Blood Culture PCR (2020 04:14)      -  Streptococcus sp. (Not Grp A, B or S pneumoniae): Detec      Method Type: PCR    Culture - Blood (collected 2020 10:19)  Source: .Blood None  Gram Stain (2020 04:15):    Growth in anaerobic bottle:    Gram positive cocci in pairs    Growth in aerobic bottle:    Gram positive cocci in pairs  Preliminary Report (2020 04:14):    Growth in anaerobic bottle:    Gram positive cocci in pairs    Growth in aerobic bottle:    Gram positive cocci in pairs        COVID-19 PCR: NotDetec (20 @ 10:19)    Radiology: all available radiological tests reviewed    ra< from: CT Abdomen and Pelvis w/ IV Cont (20 @ 13:49) >  No acute pathology.    < end of copied text >      Advanced directives addressed: full resuscitation Patient is a 70y old  Female who presents with a chief complaint of hypotension, acute blood loss     HPI:  71 y/o Female with h/o severe aortic stenosis s/p TAVR on Oct , 2019 (only on aspirin), gastric bypass(on ), hypothyroidism was admitted on  for increased weakness and syncope episode. Pt reports was on the bathroom when started to feel numbness and tingling in her toes and hands. She took a gatorade, felt a little better and went back to the bathroom and passed out. She denies SOB, chest pain, lighehadenedness before and after the syncope episode. She started to have watery diarrhea, approximately two episodes after the syncope episode. She reports during the last two weeks have been felling tired and weak. She denies fever, chills at home. In ER she was found hypotensive and with low grade fever. She received vancomycin IV and zosyn.        PMH: as above  PSH: as above  Meds: per reconciliation sheet, noted below  MEDICATIONS  (STANDING):  chlorhexidine 4% Liquid 1 Application(s) Topical <User Schedule>  lactated ringers. 1000 milliLiter(s) (60 mL/Hr) IV Continuous <Continuous>  levothyroxine 175 MICROGram(s) Oral daily  pantoprazole  Injectable 40 milliGRAM(s) IV Push every 12 hours  phenylephrine    Infusion 0.5 MICROgram(s)/kG/Min (12.8 mL/Hr) IV Continuous <Continuous>  piperacillin/tazobactam IVPB.. 3.375 Gram(s) IV Intermittent every 8 hours  potassium phosphate / sodium phosphate Powder (PHOS-NaK) 1 Packet(s) Oral four times a day  vancomycin  IVPB 1000 milliGRAM(s) IV Intermittent every 12 hours    MEDICATIONS  (PRN):  acetaminophen   Tablet .. 650 milliGRAM(s) Oral every 6 hours PRN Temp greater or equal to 38C (100.4F), Mild Pain (1 - 3)    Allergies    No Known Allergies    Intolerances      Social: no smoking, no alcohol, no illegal drugs; no recent travel, no exposure to TB  FAMILY HISTORY:  No pertinent family history in first degree relatives      no history of premature cardiovascular disease in first degree relatives    ROS: the patient denies fever, no chills, no HA, no seizures, had dizziness, no sore throat, no nasal congestion, no blurry vision, no CP, no palpitations, no SOB, no cough, no abdominal pain, had diarrhea, no N/V, no dysuria, no leg pain, no claudication, no rash, no joint aches, no rectal pain or bleeding, no night sweats; had syncopal episode  All other systems reviewed and are negative    Vital Signs Last 24 Hrs  T(C): 36.4 (2020 08:00), Max: 37.9 (2020 20:00)  T(F): 97.5 (2020 08:00), Max: 100.3 (2020 20:00)  HR: 48 (2020 08:30) (43 - 78)  BP: 120/59 (2020 08:30) (80/63 - 132/61)  BP(mean): 74 (2020 08:30) (36 - 89)  RR: 13 (2020 08:30) (9 - 19)  SpO2: 98% (2020 08:30) (94% - 100%)  Daily Height in cm: 175.26 (2020 13:55)    Daily     PE:    Constitutional:  No acute distress  HEENT: NC/AT, EOMI, PERRLA, conjunctivae clear; ears and nose atraumatic; pharynx benign  Neck: supple; thyroid not palpable  Back: no tenderness  Respiratory: respiratory effort normal; few crackles at bases  Cardiovascular: S1S2 regular, no murmurs  Abdomen: soft, not tender, not distended, positive BS; no liver or spleen organomegaly  Genitourinary: no suprapubic tenderness  Lymphatic: no LN palpable  Musculoskeletal: no muscle tenderness, no joint swelling or tenderness  Extremities: no pedal edema  Neurological/ Psychiatric: AxOx3, judgement and insight normal; moving all extremities  Skin: no rashes; no palpable lesions    Labs: all available labs reviewed                        9.8    17.31 )-----------( 120      ( 2020 00:52 )             30.8     11-03    142  |  104  |  12  ----------------------------<  97  3.2<L>   |  33<H>  |  0.63    Ca    5.7<LL>      2020 06:25  Phos  2.2     11-03  Mg     1.9         TPro  4.8<L>  /  Alb  1.6<L>  /  TBili  0.4  /  DBili  x   /  AST  32  /  ALT  13  /  AlkPhos  85       LIVER FUNCTIONS - ( 2020 10:19 )  Alb: 1.6 g/dL / Pro: 4.8 gm/dL / ALK PHOS: 85 U/L / ALT: 13 U/L / AST: 32 U/L / GGT: x           Urinalysis Basic - ( 2020 00:52 )    Color: Yellow / Appearance: Clear / S.005 / pH: x  Gluc: x / Ketone: Negative  / Bili: Negative / Urobili: Negative mg/dL   Blood: x / Protein: Negative mg/dL / Nitrite: Negative   Leuk Esterase: Small / RBC: Negative /HPF / WBC 6-10   Sq Epi: x / Non Sq Epi: Moderate / Bacteria: Moderate      Culture - Blood (collected 2020 10:21)  Source: .Blood None  Gram Stain (2020 04:34):    Growth in anaerobic bottle:    Gram positive cocci in pairs    Growth in aerobic bottle:    Gram positive cocci in pairs  Preliminary Report (2020 04:34):    Growth in anaerobic bottle:    Gram positive cocci in pairs    Growth in aerobic bottle:    Gram positive cocci in pairs  Organism: Blood Culture PCR (2020 04:14)      -  Streptococcus sp. (Not Grp A, B or S pneumoniae): Detec      Method Type: PCR    Culture - Blood (collected 2020 10:19)  Source: .Blood None  Gram Stain (2020 04:15):    Growth in anaerobic bottle:    Gram positive cocci in pairs    Growth in aerobic bottle:    Gram positive cocci in pairs  Preliminary Report (2020 04:14):    Growth in anaerobic bottle:    Gram positive cocci in pairs    Growth in aerobic bottle:    Gram positive cocci in pairs    COVID-19 PCR: NotDetec (20 @ 10:19)    Radiology: all available radiological tests reviewed    ra< from: CT Abdomen and Pelvis w/ IV Cont (20 @ 13:49) >  No acute pathology.    < end of copied text >      Advanced directives addressed: full resuscitation

## 2020-11-03 NOTE — CONSULT NOTE ADULT - ASSESSMENT
IMP:  1. Strep sepsis, possible prosthetic valve endocarditis  2. anemia, acute on chronic, etiol. unclear  3. s/p TAVR 2019    Plan: TTE reviewed, no obvious vegetations. wean pressors as tolerated, cont abx. will plan J[facility]IER for 11/3  Will follow

## 2020-11-03 NOTE — CONSULT NOTE ADULT - SUBJECTIVE AND OBJECTIVE BOX
GI consult    HPI:  69 y/o F with PMHx of severe aortic stenosis s/p TAVR on Oct , 2019 (only on aspirin),  gastric bypass (), hypothyroidism came to the ED BIBEMs from home s/p a syncope episode. Pt reports was on the bathroom when started to feel numbness and tingling in her toes and hands. She took a Gatorade felt a little better and went back to the bathroom and passed out. She denies SOB, chest pain, lightheadedness before and after the syncope episode. She started to have watery diarrhea, approximately two episodes after the syncope episode. She reports during the last two weeks have been felling tired and weak. She denies hematuria, vaginal bleeding, hematochezia, fever, chills, abdominal pain and any other symptoms.     At arrival to ED patient found  to be hypotensive with SBP 70-80s and DBP 40-50S. Labs showed hemoglobin 6.8, hypokalemia 2.1, hypocalcemia 5.5, lactate 1.1. Pt received 2.1L IVF and 1 PRBC with minimal improvement of blood pressure. Pt was started on Merlin-Synephrine and admitted to ICU for further management.     Called to eval severe microcytic anemia with hgb 6.8 on admission. No h/o GIB, no melena or hematochezia. Pt was reportedly FOBT negative in ER and stool reported as light yellow color. She had EGD about 1 y ago and colonoscopy about 3 y ago with Dr. Walker Barroso. 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    Found to have Strep bacteremia and remains on pressors today. Pending eval with JAVIER tomorrow to r/o prosthetic valve endocarditis.    PAST MEDICAL & SURGICAL HISTORY:  Aortic stenosis s/p TAVR    Eczema    Osteoarthritis  left knee arthroscopy     Hypothyroidism    Vocal Cord Polyp Removed   Denies current vocal cord polyps    Gastric Bypass  for Obesity     Abdominal cyst removed     S/P left Bunionectomy     Parotid Mass Removed      Section   d/t failure to progress and Repeat     History of Appendectomy        Home Medications:  Aspirin Enteric Coated 81 mg oral delayed release tablet: 1 tab(s) orally once a day (2020 12:58)  finasteride 5 mg oral tablet: 0.5 tab(s) orally once a day (2020 12:58)  levothyroxine 175 mcg (0.175 mg) oral tablet: 1 tab(s) orally once a day (2020 12:58)  metoprolol succinate 25 mg oral tablet, extended release: 1 tab(s) orally once a day (2020 12:58)  Multiple Vitamins oral tablet: 1 tab(s) orally once a day (2020 12:58)      MEDICATIONS  (STANDING):  chlorhexidine 4% Liquid 1 Application(s) Topical <User Schedule>  lactated ringers. 1000 milliLiter(s) (60 mL/Hr) IV Continuous <Continuous>  levothyroxine 175 MICROGram(s) Oral daily  midodrine 10 milliGRAM(s) Oral every 8 hours  pantoprazole  Injectable 40 milliGRAM(s) IV Push every 12 hours  phenylephrine    Infusion 0.5 MICROgram(s)/kG/Min (12.8 mL/Hr) IV Continuous <Continuous>  piperacillin/tazobactam IVPB.. 3.375 Gram(s) IV Intermittent every 8 hours  vancomycin  IVPB 1000 milliGRAM(s) IV Intermittent every 12 hours    MEDICATIONS  (PRN):  acetaminophen   Tablet .. 650 milliGRAM(s) Oral every 6 hours PRN Temp greater or equal to 38C (100.4F), Mild Pain (1 - 3)      Allergies    No Known Allergies    Intolerances        SOCIAL HISTORY: heavy EtOH use in past, still drinks about 1 drink per day     FAMILY HISTORY:  No pertinent family history in first degree relatives        ROS  As above  Otherwise unremarkable, all systems reviewed    PE:  Vital Signs Last 24 Hrs  T(C): 36.1 (2020 16:00), Max: 37.9 (2020 20:00)  T(F): 97 (2020 16:00), Max: 100.3 (2020 20:00)  HR: 56 (2020 17:00) (43 - 76)  BP: 103/53 (2020 17:00) (78/42 - 132/61)  BP(mean): 65 (2020 17:00) (51 - 89)  RR: 8 (2020 17:00) (8 - 19)  SpO2: 98% (2020 17:00) (94% - 100%)    Constitutional: NAD, well-developed, A+Ox3  Anicteric   Respiratory: CTABL, breathing comfortably  Cardiovascular: S1 and S2, RRR  Gastrointestinal: +BS, soft, non tender, non distended, no mass  Extremities: warm, well perfused, no edema  Psychiatric: Normal mood, normal affect  Neuro: moves all extremities, grossly intact  Skin: No rashes or lesions    LABS:                        9.5    11.13 )-----------( 102      ( 2020 06:25 )             30.1     11-03    142  |  104  |  12  ----------------------------<  97  3.2<L>   |  33<H>  |  0.63    Ca    5.7<LL>      2020 06:25  Phos  2.2     11-  Mg     1.9     11-    TPro  4.8<L>  /  Alb  1.6<L>  /  TBili  0.4  /  DBili  x   /  AST  32  /  ALT  13  /  AlkPhos  85  11-02    PT/INR - ( 2020 10:19 )   PT: 18.3 sec;   INR: 1.61 ratio         PTT - ( 2020 10:19 )  PTT:30.2 sec  LIVER FUNCTIONS - ( 2020 10:19 )  Alb: 1.6 g/dL / Pro: 4.8 gm/dL / ALK PHOS: 85 U/L / ALT: 13 U/L / AST: 32 U/L / GGT: x             RADIOLOGY & ADDITIONAL STUDIES:      EXAM:  CT ABDOMEN AND PELVIS IC                            PROCEDURE DATE:  2020          INTERPRETATION:  CT ABDOMEN AND PELVIS WITH IV CONTRAST    CLINICAL INFORMATION: diarrhea and hypotensiton adn low h/h - ? infection    COMPARISON: CT abdomen and pelvis 2008    PROCEDURE:  CT of the Abdomen and Pelvis was performed with intravenous contrast.  Intravenous contrast: 90 cc Omnipaque 350  Oral contrast: None.  Sagittal and coronal reformats were performed.    FINDINGS:  LOWER CHEST: Small bilateral pleural effusions and bibasilar atelectasis.    LIVER: Diffuse steatosis.  BILE DUCTS: Nondilated.  GALLBLADDER: Gallstones.  SPLEEN: Nonspecific central low attenuation.  PANCREAS: Normal.  ADRENALS: Normal.  KIDNEYS/URETERS: No calculi, hydronephrosis, or soft tissue attenuating mass.    BLADDER: Normal.  REPRODUCTIVE ORGANS: No uterine or adnexal abnormality.    BOWEL: No bowel-related abnormality. Specifically, no evidence of acute diverticulitis. Normal ileocecal region. No bowel obstruction or bowel inflammation. Stable gastric bypass.  PERITONEUM: Small pelvic free fluid. No free air.  VESSELS:  Aortoiliac atherosclerosis without aneurysm.  RETROPERITONEUM/LYMPH NODES: No adenopathy.  ABDOMINAL WALL: Normal.  BONES: No acute bony abnormality.    IMPRESSION:    No acute pathology.              NUHA MEZA MD; Attending Radiologist  This document has been electronically signed. 2020  3:45PM

## 2020-11-04 ENCOUNTER — TRANSCRIPTION ENCOUNTER (OUTPATIENT)
Age: 70
End: 2020-11-04

## 2020-11-04 VITALS
HEART RATE: 56 BPM | OXYGEN SATURATION: 98 % | SYSTOLIC BLOOD PRESSURE: 110 MMHG | RESPIRATION RATE: 19 BRPM | DIASTOLIC BLOOD PRESSURE: 63 MMHG

## 2020-11-04 LAB
-  CEFTRIAXONE: SIGNIFICANT CHANGE UP
-  CLINDAMYCIN: SIGNIFICANT CHANGE UP
-  ERYTHROMYCIN: SIGNIFICANT CHANGE UP
-  LEVOFLOXACIN: SIGNIFICANT CHANGE UP
-  PENICILLIN: SIGNIFICANT CHANGE UP
-  VANCOMYCIN: SIGNIFICANT CHANGE UP
ANION GAP SERPL CALC-SCNC: 4 MMOL/L — LOW (ref 5–17)
BUN SERPL-MCNC: 9 MG/DL — SIGNIFICANT CHANGE UP (ref 7–23)
CALCIUM SERPL-MCNC: 5.7 MG/DL — CRITICAL LOW (ref 8.5–10.1)
CHLORIDE SERPL-SCNC: 107 MMOL/L — SIGNIFICANT CHANGE UP (ref 96–108)
CO2 SERPL-SCNC: 31 MMOL/L — SIGNIFICANT CHANGE UP (ref 22–31)
CREAT SERPL-MCNC: 0.6 MG/DL — SIGNIFICANT CHANGE UP (ref 0.5–1.3)
CULTURE RESULTS: SIGNIFICANT CHANGE UP
GLUCOSE SERPL-MCNC: 80 MG/DL — SIGNIFICANT CHANGE UP (ref 70–99)
HCT VFR BLD CALC: 26.6 % — LOW (ref 34.5–45)
HGB BLD-MCNC: 8.6 G/DL — LOW (ref 11.5–15.5)
MAGNESIUM SERPL-MCNC: 1.8 MG/DL — SIGNIFICANT CHANGE UP (ref 1.6–2.6)
MCHC RBC-ENTMCNC: 22.4 PG — LOW (ref 27–34)
MCHC RBC-ENTMCNC: 32.3 GM/DL — SIGNIFICANT CHANGE UP (ref 32–36)
MCV RBC AUTO: 69.3 FL — LOW (ref 80–100)
METHOD TYPE: SIGNIFICANT CHANGE UP
METHOD TYPE: SIGNIFICANT CHANGE UP
PHOSPHATE SERPL-MCNC: 2.4 MG/DL — LOW (ref 2.5–4.5)
PLATELET # BLD AUTO: 110 K/UL — LOW (ref 150–400)
POTASSIUM SERPL-MCNC: 3.4 MMOL/L — LOW (ref 3.5–5.3)
POTASSIUM SERPL-SCNC: 3.4 MMOL/L — LOW (ref 3.5–5.3)
RBC # BLD: 3.84 M/UL — SIGNIFICANT CHANGE UP (ref 3.8–5.2)
RBC # FLD: SIGNIFICANT CHANGE UP (ref 10.3–14.5)
SODIUM SERPL-SCNC: 142 MMOL/L — SIGNIFICANT CHANGE UP (ref 135–145)
SPECIMEN SOURCE: SIGNIFICANT CHANGE UP
VIT D25+D1,25 OH+D1,25 PNL SERPL-MCNC: 143 PG/ML — HIGH (ref 19.9–79.3)
WBC # BLD: 8.75 K/UL — SIGNIFICANT CHANGE UP (ref 3.8–10.5)
WBC # FLD AUTO: 8.75 K/UL — SIGNIFICANT CHANGE UP (ref 3.8–10.5)

## 2020-11-04 PROCEDURE — 99291 CRITICAL CARE FIRST HOUR: CPT

## 2020-11-04 RX ORDER — POTASSIUM CHLORIDE 20 MEQ
40 PACKET (EA) ORAL ONCE
Refills: 0 | Status: COMPLETED | OUTPATIENT
Start: 2020-11-04 | End: 2020-11-04

## 2020-11-04 RX ORDER — PIPERACILLIN AND TAZOBACTAM 4; .5 G/20ML; G/20ML
3.38 INJECTION, POWDER, LYOPHILIZED, FOR SOLUTION INTRAVENOUS
Qty: 0 | Refills: 0 | DISCHARGE
Start: 2020-11-04

## 2020-11-04 RX ORDER — VANCOMYCIN HCL 1 G
1 VIAL (EA) INTRAVENOUS
Qty: 0 | Refills: 0 | DISCHARGE
Start: 2020-11-04

## 2020-11-04 RX ORDER — POTASSIUM PHOSPHATE, MONOBASIC POTASSIUM PHOSPHATE, DIBASIC 236; 224 MG/ML; MG/ML
15 INJECTION, SOLUTION INTRAVENOUS ONCE
Refills: 0 | Status: COMPLETED | OUTPATIENT
Start: 2020-11-04 | End: 2020-11-04

## 2020-11-04 RX ORDER — MIDODRINE HYDROCHLORIDE 2.5 MG/1
1 TABLET ORAL
Qty: 0 | Refills: 0 | DISCHARGE
Start: 2020-11-04

## 2020-11-04 RX ORDER — ASPIRIN/CALCIUM CARB/MAGNESIUM 324 MG
1 TABLET ORAL
Qty: 0 | Refills: 0 | DISCHARGE

## 2020-11-04 RX ORDER — CALCIUM CARBONATE 500(1250)
1 TABLET ORAL THREE TIMES A DAY
Refills: 0 | Status: DISCONTINUED | OUTPATIENT
Start: 2020-11-04 | End: 2020-11-04

## 2020-11-04 RX ORDER — METOPROLOL TARTRATE 50 MG
1 TABLET ORAL
Qty: 0 | Refills: 0 | DISCHARGE

## 2020-11-04 RX ORDER — PANTOPRAZOLE SODIUM 20 MG/1
40 TABLET, DELAYED RELEASE ORAL
Qty: 0 | Refills: 0 | DISCHARGE
Start: 2020-11-04

## 2020-11-04 RX ORDER — CALCIUM CARBONATE 500(1250)
1 TABLET ORAL
Qty: 0 | Refills: 0 | DISCHARGE
Start: 2020-11-04

## 2020-11-04 RX ORDER — FINASTERIDE 5 MG/1
0.5 TABLET, FILM COATED ORAL
Qty: 0 | Refills: 0 | DISCHARGE

## 2020-11-04 RX ADMIN — Medication 175 MICROGRAM(S): at 05:15

## 2020-11-04 RX ADMIN — MIDODRINE HYDROCHLORIDE 10 MILLIGRAM(S): 2.5 TABLET ORAL at 05:15

## 2020-11-04 RX ADMIN — SODIUM CHLORIDE 60 MILLILITER(S): 9 INJECTION, SOLUTION INTRAVENOUS at 05:20

## 2020-11-04 RX ADMIN — Medication 40 MILLIEQUIVALENT(S): at 08:26

## 2020-11-04 RX ADMIN — PANTOPRAZOLE SODIUM 40 MILLIGRAM(S): 20 TABLET, DELAYED RELEASE ORAL at 10:24

## 2020-11-04 RX ADMIN — CHLORHEXIDINE GLUCONATE 1 APPLICATION(S): 213 SOLUTION TOPICAL at 05:08

## 2020-11-04 RX ADMIN — MIDODRINE HYDROCHLORIDE 10 MILLIGRAM(S): 2.5 TABLET ORAL at 13:08

## 2020-11-04 RX ADMIN — PIPERACILLIN AND TAZOBACTAM 25 GRAM(S): 4; .5 INJECTION, POWDER, LYOPHILIZED, FOR SOLUTION INTRAVENOUS at 13:08

## 2020-11-04 RX ADMIN — Medication 1 TABLET(S): at 13:08

## 2020-11-04 RX ADMIN — POTASSIUM PHOSPHATE, MONOBASIC POTASSIUM PHOSPHATE, DIBASIC 62.5 MILLIMOLE(S): 236; 224 INJECTION, SOLUTION INTRAVENOUS at 10:23

## 2020-11-04 RX ADMIN — PIPERACILLIN AND TAZOBACTAM 25 GRAM(S): 4; .5 INJECTION, POWDER, LYOPHILIZED, FOR SOLUTION INTRAVENOUS at 05:15

## 2020-11-04 RX ADMIN — Medication 250 MILLIGRAM(S): at 10:24

## 2020-11-04 NOTE — DISCHARGE NOTE PROVIDER - NSDCMRMEDTOKEN_GEN_ALL_CORE_FT
Aspirin Enteric Coated 81 mg oral delayed release tablet: 1 tab(s) orally once a day  finasteride 5 mg oral tablet: 0.5 tab(s) orally once a day  levothyroxine 175 mcg (0.175 mg) oral tablet: 1 tab(s) orally once a day  metoprolol succinate 25 mg oral tablet, extended release: 1 tab(s) orally once a day  Multiple Vitamins oral tablet: 1 tab(s) orally once a day

## 2020-11-04 NOTE — PROVIDER CONTACT NOTE (CRITICAL VALUE NOTIFICATION) - NS PROVIDER READ BACK TO LAB
[facility]  Otolaryngology (ENT)  [address] [place] [address] - 10[address]  [place], [place] [id number]  Phone: [phone]  Fax:   Follow Up Time:

## 2020-11-04 NOTE — PROGRESS NOTE ADULT - SUBJECTIVE AND OBJECTIVE BOX
Patient is a 70y old  Female who presents with a chief complaint of hypotension, acute blood loss (2020 18:20)      HPI:  71 y/o F with PMHx of severe aortic stenosis s/p TAVR on Oct , 2019 (only on aspirin),  gastric bypass(on ), hypothyroidism came to the ED BIBEMs from home s/p a syncope episode. Pt reports was on the bathroom when started to feel numbness and tingling in her toes and hands. She took a gatorade, felt a little better and went back to the bathroom and passed out. She denies SOB, chest pain, lighehadenedness before and after the syncope episode. She started to have watery diarrhea, approximately two episodes after the syncope episode. She reports during the last two weeks have been felling tired and weak. She denies hematuria, vaginal bleeding, hematochezia, fever, chills, abdominal pain and any other symptoms.     At arrival to ED patient found  to be hypotensive with SBP 70-80s and DBP 40-50S. Labs showed hemoglobin 6.8, hypokalemia 2.1, hypocalcemia 5.5, lactate 1.1. Pt received 2.1L IVF and 1 PRBC with minimal improvement of blood pressure. Pt was started on Merlin-Synephrine and admitted to ICU for further management. (2020 13:24)  11/3 Cardiology.  70 year old female with TAVR approx one year ago history of etoh abuse, gastric bypass  admitted with 2 weeks weakness and fatigue with acute numbness of hands and feet on  and then syncopal episode. Patient febrile and anemic on admission. Blood cultures now positive for strep, unclassified.   no new complaints    PAST MEDICAL & SURGICAL HISTORY:  Aortic stenosis    Eczema    Osteoarthritis  left knee arthroscopy     Hypothyroidism    Vocal Cord Polyp Removed   Denies current vocal cord polyps    Gastric Bypass  for Obesity     Abdominal cyst removed     S/P left Bunionectomy     Parotid Mass Removed      Section   d/t failure to progress and Repeat     History of Appendectomy          MEDICATIONS  (STANDING):  chlorhexidine 4% Liquid 1 Application(s) Topical <User Schedule>  lactated ringers. 1000 milliLiter(s) (60 mL/Hr) IV Continuous <Continuous>  levothyroxine 175 MICROGram(s) Oral daily  midodrine 10 milliGRAM(s) Oral every 8 hours  pantoprazole  Injectable 40 milliGRAM(s) IV Push every 12 hours  phenylephrine    Infusion 0.5 MICROgram(s)/kG/Min (12.8 mL/Hr) IV Continuous <Continuous>  piperacillin/tazobactam IVPB.. 3.375 Gram(s) IV Intermittent every 8 hours  potassium phosphate IVPB 15 milliMole(s) IV Intermittent once  vancomycin  IVPB 1000 milliGRAM(s) IV Intermittent every 12 hours    MEDICATIONS  (PRN):  acetaminophen   Tablet .. 650 milliGRAM(s) Oral every 6 hours PRN Temp greater or equal to 38C (100.4F), Mild Pain (1 - 3)          Vital Signs Last 24 Hrs  T(C): 36.7 (2020 04:00), Max: 36.9 (2020 20:00)  T(F): 98 (2020 04:00), Max: 98.4 (2020 20:00)  HR: 49 (2020 08:00) (47 - 65)  BP: 109/52 (2020 08:00) (78/42 - 111/48)  BP(mean): 63 (2020 08:00) (51 - 80)  RR: 16 (2020 08:00) (8 - 19)  SpO2: 96% (2020 08:00) (95% - 100%)    I&O's Summary    2020 07:01  -  2020 07:00  --------------------------------------------------------  IN: 3305.7 mL / OUT: 1850 mL / NET: 1455.7 mL        PHYSICAL EXAM  General Appearance: Comfortable  HEENT: ncat  Neck:   Back:   Lungs: clear  Heart: rrs1s2 2/6sem base  Abdomen: soft nt  Extremities: no edema  Skin:   Neurologic:       INTERPRETATION OF TELEMETRY:    ECG:        LABS:                          8.6    8.75  )-----------( 110      ( 2020 06:56 )             26.6     11-04    142  |  107  |  9   ----------------------------<  80  3.4<L>   |  31  |  0.60    Ca    5.7<LL>      2020 06:56  Phos  2.4     11-  Mg     1.8     11-04    TPro  4.8<L>  /  Alb  1.6<L>  /  TBili  0.4  /  DBili  x   /  AST  32  /  ALT  13  /  AlkPhos  85  11-02    CARDIAC MARKERS ( 2020 20:35 )  0.097 ng/mL / x     / x     / x     / x      CARDIAC MARKERS ( 2020 12:27 )  0.115 ng/mL / x     / x     / x     / x      CARDIAC MARKERS ( 2020 10:19 )  0.107 ng/mL / x     / x     / x     / x              PT/INR - ( 2020 10:19 )   PT: 18.3 sec;   INR: 1.61 ratio         PTT - ( 2020 10:19 )  PTT:30.2 sec  Urinalysis Basic - ( 2020 00:52 )    Color: Yellow / Appearance: Clear / S.005 / pH: x  Gluc: x / Ketone: Negative  / Bili: Negative / Urobili: Negative mg/dL   Blood: x / Protein: Negative mg/dL / Nitrite: Negative   Leuk Esterase: Small / RBC: Negative /HPF / WBC 6-10   Sq Epi: x / Non Sq Epi: Moderate / Bacteria: Moderate            RADIOLOGY & ADDITIONAL STUDIES:

## 2020-11-04 NOTE — PROGRESS NOTE ADULT - ASSESSMENT
69 y/o F with PMHx of  severe aortic stenosis s/p TAVR on Oct , 2019 (only on aspirin),  gastric bypass 2004, hypothyroidism came to the ED BIBEMs from home s/p syncope episode and was found with anemia , hypokalemia and hypotension refractory to IV fluid resuscitation requiring  management with vasopressors.     1)Sepsis secondary to prosthetic valve endocarditis   2)  Hypotension currently on pressors   3) Acute on chronic anemia   4) Hypocalcemia suspected due to secondary hypoparathyroidism   5) Hypokalemia/ hypophosphatemia/ hypomagnesemia   6) Elevated troponin mostly due to ischemic demand   7) Severe aortic stenosis s/p TAVR   8) Hypothyroidism   9) s/p syncope episode at home     Plan:    - BCx performed on 11/2/20 showed growth of streptococcus gallolyticus.  - TTE performed on 11/3/20 showed a LVEF 35-40% concerning of HF   - JAVIER performed today by Dr La  showed large vegetation in the aortic valve and moderate aortic regurgitation.   - Pt will benefit of a possible replacement of the kevin. We willf ollow up with cardiology   - Continue Vancomycin   - Start Merlin-Synephrine and titrate as tolerated   - CT abdomen -no RP bleed on CT  - Check haptoglobin level and LDH to rule out concomitant hemolysis in the setting of TAVR   - No source of infection identified at this moment   - Lactate 1.3, WBC WNL   - Monitor vital signs     #Hypokalemia   - K level 2.1  -Could be secondary to acute blood loss. Pt also reported some episodes of diarrhea.     -s/p KCL 10 mEq x3 and 40 mEq PO   - Monitor K levels     #Hypocalcemia   - Calcium 5.5 , corrected 7.4   - s/p Calcium gluconate 2g IV   - Check PTH   - Monitor and replace as needed     # Hypomagnesemia/ Hypophosphatemia   - Phos level 1.9   - Magnesium level 1.4   - Replace and monitor levels     # s/p Syncope   - Coud bedue to hypotension secondary to blood loss   - Pt also with a hx of severe aortic stenosis s/p TVAR   - CT head/spine w/o acute abnormalities   - Check ECHO   - Fall precautions     # Elevated troponin   -  0.107>> 0.115   - Could be secondary to demand ischemia   - Trend troponin, no ASA at this time      - On oct, 2019   - Only on ASA at home  - On Metroprolol 25 mg PO at home. Hold due to hypotension        -Continue Synthroid 175 mcg PO daily    #EtOH abuse  - Pt use wine since 10 years ago. Last drink was 2 weeks ago.   - Monitor for signs of EtOH withdrawal     #DVT  - SCDs     Case discussed with Dr De Luna 71 y/o F with PMHx of  severe aortic stenosis s/p TAVR on Oct , 2019 (only on aspirin),  gastric bypass 2004, hypothyroidism came to the ED BIBEMs from home s/p syncope episode and was found with anemia , hypokalemia and hypotension refractory to IV fluid resuscitation requiring  management with vasopressors.     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  - TTE performed on 11/3/20 showed a LVEF 35-40% concerning of HF   - JAVIER performed today by Dr La  showed large vegetation in the aortic valve and moderate aortic regurgitation.   - Pt will benefit of a possible replacement of the valve. Possible transfer to [facility].  - Continue Zosyn 3.375 IV every 8 hrs and Vancomycin 1g Iv every 12 hrs ( day#2)   - On low dose of Merlin-Synephrine.  Titrate as tolerated   - Increase Midodrine to 10 mg PO every 8 hrs   - No fever overnight  - Leukocytosis is improving   - Pt continue with hypocalcemia  possible due to secondary hypoparathyroidism in the setting of elevated PTH and normal renal function. Pt reports used calcium supplement in the past but stopped using it. Could be secondary to malabsorption.   - Start calcium carbonate PO TID   - Follow up vit D levels   - Pt continue with hypokalemia 3.4 but improving. KCL 40 mEq for one dose given. Monitor K levels    - Continue PO diet   - Continue IV fluid   - Continue with synthroid 175 mcg PO daily   - DVT ppx: SCDs     Case discussed with Dr Diego

## 2020-11-04 NOTE — PROCEDURAL SAFETY CHECKLIST WITH OR WITHOUT SEDATION - NSPOSTCOMMENTFT_GEN_ALL_CORE
Pt. given viscous lidocaine, swish and swallow, by Dr. Thompson at 0910; JAVIER probe inserted without difficulty by Dr. Thompson at 0917; study completed and probe removed at 0917; [email or web address] well.

## 2020-11-04 NOTE — PROGRESS NOTE ADULT - SUBJECTIVE AND OBJECTIVE BOX
HPI:  71 y/o F with PMHx of severe aortic stenosis s/p TAVR on Oct , 2019 (only on aspirin),  gastric bypass(on ), hypothyroidism came to the ED BIBEMs from home s/p a syncope episode. Pt reports was on the bathroom when started to feel numbness and tingling in her toes and hands. She took a gatorade, felt a little better and went back to the bathroom and passed out. She denies SOB, chest pain, lighehadenedness before and after the syncope episode. She started to have watery diarrhea, approximately two episodes after the syncope episode. She reports during the last two weeks have been felling tired and weak. She denies hematuria, vaginal bleeding, hematochezia, fever, chills, abdominal pain and any other symptoms.     At arrival to ED patient found  to be hypotensive with SBP 70-80s and DBP 40-50S. Labs showed hemoglobin 6.8, hypokalemia 2.1, hypocalcemia 5.5, lactate 1.1. Pt received 2.1L IVF and 1 PRBC with minimal improvement of blood pressure. Pt was started on Merlin-Synephrine and admitted to ICU for further management. (2020 13:24)    SUBJECTIVE:   Pt is evaluated at bedside and found NAD and in no acute distress. Blood culture performed on 20 growth streptococcus gallolyticus. Pt had a JAVIER at bedside that showed vegetations in the aortic valve. Pt will continue treatment with zosyn and vancomycin. No fever overnight.      MEDICATIONS  (STANDING):  calcium carbonate   1250 mG (OsCal) 1 Tablet(s) Oral three times a day  chlorhexidine 4% Liquid 1 Application(s) Topical <User Schedule>  levothyroxine 175 MICROGram(s) Oral daily  midodrine 10 milliGRAM(s) Oral every 8 hours  pantoprazole  Injectable 40 milliGRAM(s) IV Push every 12 hours  phenylephrine    Infusion 0.5 MICROgram(s)/kG/Min (12.8 mL/Hr) IV Continuous <Continuous>  piperacillin/tazobactam IVPB.. 3.375 Gram(s) IV Intermittent every 8 hours  vancomycin  IVPB 1000 milliGRAM(s) IV Intermittent every 12 hours    MEDICATIONS  (PRN):  acetaminophen   Tablet .. 650 milliGRAM(s) Oral every 6 hours PRN Temp greater or equal to 38C (100.4F), Mild Pain (1 - 3)      Vital Signs Last 24 Hrs  T(C): 36.6 (2020 09:40), Max: 36.9 (2020 20:00)  T(F): 97.8 (2020 09:40), Max: 98.4 (2020 20:00)  HR: 51 (2020 11:) (47 - 65)  BP: 98/51 (2020 11:) (78/42 - 111/48)  BP(mean): 64 (2020 11:) (51 - 80)  RR: 18 (2020 11:) (8 - 19)  SpO2: 94% (2020 11:) (94% - 100%)      Physical Exam   Gen: NAD, comfortable, pale   HENT: atraumatic head and ears, no gross abnormalities of ears, mucous membranes moist, no oral lesions, neck supple without masses/goiter/lymphadenopathy  CV: RRR, nl s1/s2, 3/6 systolic murmur most prominent in  the 2nd right and left intercostal space   Pulm: nl respiratory effort, CTAB, no wheezes/crackles/rhonchi  Abd: normoactive bowel sounds in all 4 quadrants, soft, nontender, nondistended, no rebound, no guarding, no masses  Extremities: no pedal edema, pedal pulses palpable   Skin: nl warm and dry, no wounds   Neuro: A&Ox3, answering questions appropriately, PERRL, EOMI, face symmetric, sensation equal bilaterally in face, tongue midline, no dysarthria, 5/5 strength in upper and lower extremities bilaterally      LABS:                          8.6    8.75  )-----------( 110      ( 2020 06:56 )             26.6     2020 06:56    142    |  107    |  9      ----------------------------<  80     3.4     |  31     |  0.60     Ca    5.7        2020 06:56  Phos  2.4       2020 06:56  Mg     1.8       2020 06:56          CAPILLARY BLOOD GLUCOSE        CARDIAC MARKERS ( 2020 20:35 )  0.097 ng/mL / x     / x     / x     / x      CARDIAC MARKERS ( 2020 12:27 )  0.115 ng/mL / x     / x     / x     / x          Urinalysis Basic - ( 2020 00:52 )    Color: Yellow / Appearance: Clear / S.005 / pH: x  Gluc: x / Ketone: Negative  / Bili: Negative / Urobili: Negative mg/dL   Blood: x / Protein: Negative mg/dL / Nitrite: Negative   Leuk Esterase: Small / RBC: Negative /HPF / WBC 6-10   Sq Epi: x / Non Sq Epi: Moderate / Bacteria: Moderate        RADIOLOGY:     JAVIER Complete w/Spect and Color (20 @ 09:27) >     Findings     Mitral Valve   not well visualized     Aortic Valve   An aortic bio prosthetic valve is present.Large vegetation is noted with   moderate to severe AR     Tricuspid Valve   NOt well visualized     Left Atrium   No thrombus seen in the left atrial appendage.     Left Ventricle   Not well visualized     Right Ventricle   Not well visualized

## 2020-11-04 NOTE — PROGRESS NOTE ADULT - ASSESSMENT
69 y/o Female with h/o severe aortic stenosis s/p TAVR on Oct , 2019 (only on aspirin), gastric bypass(on 2004), hypothyroidism was admitted on 11/2 for increased weakness and syncope episode. Pt reports was on the bathroom when started to feel numbness and tingling in her toes and hands. She took a gatorade, felt a little better and went back to the bathroom and passed out. She denies SOB, chest pain, lighehadenedness before and after the syncope episode. She started to have watery diarrhea, approximately two episodes after the syncope episode. She reports during the last two weeks have been felling tired and weak. She denies fever, chills at home. In ER she was found hypotensive and with low grade fever. She received vancomycin IV and zosyn.     1. Low grade fever. Sepsis with strep gallolyticus. Subacute bacterial endocarditis. AS s/p TAVR.   -leukocytosis  -obtain BC x 2  -on zosyn 3.375 gm IV q8h and vancomycin 1 gm IV q12h # 2  -tolerating abx well so far; no side effects noted  -obtain vancomycin trough level   -cardiac Echo and JAVIER noted  -cardiology evaluation appreciated  -f/u cultures  -continue abx coverage  -monitor temps  -f/u CBC  -supportive care  2. Other issues:   -care per medicine

## 2020-11-04 NOTE — DIETITIAN INITIAL EVALUATION ADULT. - PERTINENT LABORATORY DATA
11-04    142  |  107  |  9   ----------------------------<  80  3.4<L>   |  31  |  0.60    Ca    5.7<LL>      04 Nov 2020 06:56  Phos  2.4     11-04  Mg     1.8     11-04

## 2020-11-04 NOTE — PROGRESS NOTE ADULT - ATTENDING COMMENTS
As above, pt seen and examined with Dr Atkinson and treatment plan formulated on rounds.     Pt off pressors.  To be tx to [facility] under Dr collins service

## 2020-11-04 NOTE — PROGRESS NOTE ADULT - SUBJECTIVE AND OBJECTIVE BOX
[facility]  ICU # 2    CC:  Hypotension     HPI:    71 y/o female with PMHx of severe aortic stenosis s/p TAVR on Oct , 2019 (only on aspirin),  gastric bypass(on ), hypothyroidism came to the ED BIBEMs from home s/p a syncope episode. Pt reports was on the bathroom when started to feel numbness and tingling in her toes and hands. She took a gatorade, felt a little better and went back to the bathroom and passed out.   At arrival to ED patient found  to be hypotensive with SBP 70-80s and DBP 40-50S. Labs showed hemoglobin 6.8, hypokalemia 2.1, hypocalcemia 5.5, lactate 1.1. Pt received 2.1L IVF and 1 PRBC with minimal improvement of blood pressure. Pt was started on Merlin-Synephrine and admitted to ICU for further management    11/3:  Case d/w Dr casas.  Pt seen and examined in ICU--had recent colon with Dr hernandez--reportedly negative.  Has been feeling weak over last 2 weeks.  No wt loss.  Stable PO intake.  Low grade temp overnight--Bcx sent-- + for strep species.  Started on IV Vanco and Pip/michelet  :  Case d/w Wayne brandt and maryam--large vegetation on aortic valve and AR.  EF 35-40%.  Strep gallolyticus bacteremia.  Spoke with Dr Quang STERLING ([facility])--arranging for tx.  Remains on low dose pressors    PMH:  As above.     PSH:  As above.     FH: Non Contributory other than those listed in HPI    Social History:  As above    MEDICATIONS  (STANDING):  calcium carbonate   1250 mG (OsCal) 1 Tablet(s) Oral three times a day  chlorhexidine 4% Liquid 1 Application(s) Topical <User Schedule>  lactated ringers. 1000 milliLiter(s) (60 mL/Hr) IV Continuous <Continuous>  levothyroxine 175 MICROGram(s) Oral daily  midodrine 10 milliGRAM(s) Oral every 8 hours  pantoprazole  Injectable 40 milliGRAM(s) IV Push every 12 hours  phenylephrine    Infusion 0.5 MICROgram(s)/kG/Min (12.8 mL/Hr) IV Continuous <Continuous>  piperacillin/tazobactam IVPB.. 3.375 Gram(s) IV Intermittent every 8 hours  vancomycin  IVPB 1000 milliGRAM(s) IV Intermittent every 12 hours    MEDICATIONS  (PRN):  acetaminophen   Tablet .. 650 milliGRAM(s) Oral every 6 hours PRN Temp greater or equal to 38C (100.4F), Mild Pain (1 - 3)      Allergies: NKDA    ROS:  SEE BELOW        ICU Vital Signs Last 24 Hrs  T(C): 36.6 (2020 09:40), Max: 36.9 (2020 20:00)  T(F): 97.8 (2020 09:40), Max: 98.4 (2020 20:00)  HR: 54 (2020 10:00) (47 - 65)  BP: 110/58 (2020 10:00) (78/42 - 111/48)  BP(mean): 71 (2020 10:00) (51 - 80)  ABP: --  ABP(mean): --  RR: 18 (2020 10:00) (8 - 19)  SpO2: 96% (2020 10:00) (95% - 100%)          I&O's Summary    2020 07:01  -  2020 07:00  --------------------------------------------------------  IN: 3305.7 mL / OUT: 1850 mL / NET: 1455.7 mL        Physical Exam:  SEE BELOW                          8.6    8.75  )-----------( 110      ( 2020 06:56 )             26.6       11-04    142  |  107  |  9   ----------------------------<  80  3.4<L>   |  31  |  0.60    Ca    5.7<LL>      2020 06:56  Phos  2.4     11-04  Mg     1.8     11-04        CARDIAC MARKERS ( 2020 20:35 )  0.097 ng/mL / x     / x     / x     / x      CARDIAC MARKERS ( 2020 12:27 )  0.115 ng/mL / x     / x     / x     / x                Urinalysis Basic - ( 2020 00:52 )    Color: Yellow / Appearance: Clear / S.005 / pH: x  Gluc: x / Ketone: Negative  / Bili: Negative / Urobili: Negative mg/dL   Blood: x / Protein: Negative mg/dL / Nitrite: Negative   Leuk Esterase: Small / RBC: Negative /HPF / WBC 6-10   Sq Epi: x / Non Sq Epi: Moderate / Bacteria: Moderate        DVT Prophylaxis:                                                            Contraindication:     Advanced Directives:    Discussed with:    Visit Information:  Time spent excluding procedure:  30 cc mins    ** Time is exclusive of billed procedures and/or teaching and/or routine family updates.

## 2020-11-04 NOTE — DIETITIAN INITIAL EVALUATION ADULT. - ADD RECOMMEND
1) add gelatein once daily and ensure enlive once daily to optimize PO intake 2) daily wt checks to track/trend changes 3) add MVI with minerals daily to ensure 100% RDI met

## 2020-11-04 NOTE — DISCHARGE NOTE PROVIDER - NSDCCPCAREPLAN_GEN_ALL_CORE_FT
PRINCIPAL DISCHARGE DIAGNOSIS  Diagnosis: Prosthetic valve endocarditis, initial encounter  Assessment and Plan of Treatment: You came to the emergency room after you passed out at home. You wer found with low blood pressure , anemia and with an infection in your aortic valve. You received antibiotic treatment. You will be transfer today to [facility] for further management and evaluation.   - Continue Zosyn 3.375 IV every 8 hrs and Vancomycin 1g IV every 12 hrs      SECONDARY DISCHARGE DIAGNOSES  Diagnosis: Hypotension  Assessment and Plan of Treatment: You was found with low blood pressure requiring intravenous medications to increse you blood pressure. You blood pressure improved. Continue hold metoprolol 25 mg PO BID.    Diagnosis: Hypomagnesemia  Assessment and Plan of Treatment: Improved    Diagnosis: Hypocalcemia  Assessment and Plan of Treatment: You were found with low calciumlevels and high  parapathyroid levels. You need to continue oral calcium supplements.    Diagnosis: Hypokalemia  Assessment and Plan of Treatment: Potassium levels today 3.4. Pt received supplement.    Diagnosis: Anemia  Assessment and Plan of Treatment: You were foudn with anemia and received 2 blood tranfusion. Follow up with gastroenteroly outpatient for further management.

## 2020-11-04 NOTE — CDI QUERY NOTE - NSCDIOTHERTXTBX_GEN_ALL_CORE_HH
Patient admitted with  Sepsis with strep .  Subacute bacterial endocarditis, S/P TAVR in 10/2019  prosthetic valve endocarditis    JAVIER: vegetation on AV    Please clarify if the Sepsis is due to or related to the prosthetic valve endocarditis  a) Sepsis related to the presence of a prosthetic valve with vegetation  b) Sepsis due to the presence of a prosthetic valve endocarditis  c) Sepsis not related to the prosthetic valve  d) Other, please clarify

## 2020-11-04 NOTE — PROGRESS NOTE ADULT - SUBJECTIVE AND OBJECTIVE BOX
Date of service: 20 @ 10:15    Lying in bed in NAD  Weak looking  Events noted: JAVIER showed large AV vegetation  Lethargic    ROS: poorly verbal    MEDICATIONS  (STANDING):  calcium carbonate   1250 mG (OsCal) 1 Tablet(s) Oral three times a day  chlorhexidine 4% Liquid 1 Application(s) Topical <User Schedule>  lactated ringers. 1000 milliLiter(s) (60 mL/Hr) IV Continuous <Continuous>  levothyroxine 175 MICROGram(s) Oral daily  midodrine 10 milliGRAM(s) Oral every 8 hours  pantoprazole  Injectable 40 milliGRAM(s) IV Push every 12 hours  phenylephrine    Infusion 0.5 MICROgram(s)/kG/Min (12.8 mL/Hr) IV Continuous <Continuous>  piperacillin/tazobactam IVPB.. 3.375 Gram(s) IV Intermittent every 8 hours  potassium phosphate IVPB 15 milliMole(s) IV Intermittent once  vancomycin  IVPB 1000 milliGRAM(s) IV Intermittent every 12 hours    Vital Signs Last 24 Hrs  T(C): 36.7 (2020 04:00), Max: 36.9 (2020 20:00)  T(F): 98 (2020 04:00), Max: 98.4 (2020 20:00)  HR: 54 (2020 10:00) (47 - 65)  BP: 114/57 (2020 10:00) (78/42 - 114/57)  BP(mean): 70 (2020 10:00) (51 - 80)  RR: 20 (2020 10:00) (8 - 20)  SpO2: 95% (2020 10:00) (95% - 99%)     Physical exam:    Constitutional:  No acute distress  HEENT: NC/AT, EOMI, PERRLA, conjunctivae clear  Neck: supple; thyroid not palpable  Back: no tenderness  Respiratory: respiratory effort normal; few crackles at bases  Cardiovascular: S1S2 regular, no murmurs  Abdomen: soft, not tender, not distended, positive BS  Genitourinary: no suprapubic tenderness  Lymphatic: no LN palpable  Musculoskeletal: no muscle tenderness, no joint swelling or tenderness  Extremities: no pedal edema  Neurological/ Psychiatric: AxOx3, moving all extremities  Skin: no rashes; no palpable lesions    Labs: reviewed                        8.6    8.75  )-----------( 110      ( 2020 06:56 )             26.6     11-04    142  |  107  |  9   ----------------------------<  80  3.4<L>   |  31  |  0.60    Ca    5.7<LL>      2020 06:56  Phos  2.4     11-04  Mg     1.8     11-04    TPro  4.8<L>  /  Alb  1.6<L>  /  TBili  0.4  /  DBili  x   /  AST  32  /  ALT  13  /  AlkPhos  85  11-02                        9.8    17.31 )-----------( 120      ( 2020 00:52 )             30.8     11-03    142  |  104  |  12  ----------------------------<  97  3.2<L>   |  33<H>  |  0.63    Ca    5.7<LL>      2020 06:25  Phos  2.2     11-03  Mg     1.9     11-03    TPro  4.8<L>  /  Alb  1.6<L>  /  TBili  0.4  /  DBili  x   /  AST  32  /  ALT  13  /  AlkPhos  85  11-02     LIVER FUNCTIONS - ( 2020 10:19 )  Alb: 1.6 g/dL / Pro: 4.8 gm/dL / ALK PHOS: 85 U/L / ALT: 13 U/L / AST: 32 U/L / GGT: x           Urinalysis Basic - ( 2020 00:52 )    Color: Yellow / Appearance: Clear / S.005 / pH: x  Gluc: x / Ketone: Negative  / Bili: Negative / Urobili: Negative mg/dL   Blood: x / Protein: Negative mg/dL / Nitrite: Negative   Leuk Esterase: Small / RBC: Negative /HPF / WBC 6-10   Sq Epi: x / Non Sq Epi: Moderate / Bacteria: Moderate      Culture - Blood (collected 2020 10:21)  Source: .Blood None  Gram Stain (2020 04:34):    Growth in anaerobic bottle:    Gram positive cocci in pairs    Growth in aerobic bottle:    Gram positive cocci in pairs  Preliminary Report (2020 09:59):    Growth in aerobic and anaerobic bottles: Streptococcus gallolyticus  Organism: Blood Culture PCR (2020 04:14)      -  Streptococcus sp. (Not Grp A, B or S pneumoniae): Detec      Method Type: PCR    Culture - Blood (collected 2020 10:19)  Source: .Blood None  Gram Stain (2020 04:15):    Growth in anaerobic bottle:    Gram positive cocci in pairs    Growth in aerobic bottle:    Gram positive cocci in pairs  Final Report (2020 09:41):    Growth in aerobic and anaerobic bottles: Streptococcus gallolyticus    "Susceptibilities not performed"    COVID-19 PCR: NotDetec (20 @ 10:19)    Radiology: all available radiological tests reviewed    < from: CT Abdomen and Pelvis w/ IV Cont (20 @ 13:49) >  No acute pathology.    < end of copied text >      Advanced directives addressed: full resuscitation

## 2020-11-04 NOTE — PROGRESS NOTE ADULT - ASSESSMENT
IMP:    69 y/o female with PMHx of severe aortic stenosis s/p TAVR on Oct , 2019 (only on aspirin),  gastric bypass (on 2004) and hypothyroidism presents with 2 week h/o weakness and fatigue to be anemic and + strep gallolyticus sepsis and in septic shock--acute aortic valve endocarditis.  Compensated HFrEF    High risk for acute decompensation and deterioration including death     Plan:    Cont with IV Vanco and Pip/michelet (3)  Actively titrate pressors to Keep MAP > 60  Increased Midodrine to 10 q8  PO diet  DC IVF  Cont with synthroid--hold BBx  DVT prophy--SCD    ICU care-- d/w ICU staff on multi disciplinary rounds and pt and drs karly and maryam-- All concerns addressed including but not limited to diagnosis, treatment plan and overall prognosis   Tx to [facility] when bed available IMP:    69 y/o female with PMHx of severe aortic stenosis s/p TAVR on Oct , 2019 (only on aspirin),  gastric bypass (on 2004) and hypothyroidism presents with 2 week h/o weakness and fatigue to be anemic and + strep gallolyticus sepsis and in septic shock--acute aortic valve endocarditis related to TAVR.  Compensated HFrEF    High risk for acute decompensation and deterioration including death     Plan:    Cont with IV Vanco and Pip/michelet (3)  Actively titrate pressors to Keep MAP > 60  Increased Midodrine to 10 q8  PO diet  DC IVF  Cont with synthroid--hold BBx  DVT prophy--SCD    ICU care-- d/w ICU staff on multi disciplinary rounds and pt and drs karyl and maryam-- All concerns addressed including but not limited to diagnosis, treatment plan and overall prognosis   Tx to [facility] when bed available

## 2020-11-04 NOTE — DISCHARGE NOTE NURSING/CASE MANAGEMENT/SOCIAL WORK - PATIENT PORTAL LINK FT
You can access the FollowMyHealth Patient Portal offered by [facility] by registering at the following website: [email or web address]. By joining [facility]’s FollowMyHealth portal, you will also be able to view your health information using other applications (apps) compatible with our system.

## 2020-11-04 NOTE — DISCHARGE NOTE PROVIDER - PROVIDER TOKENS
FREE:[LAST:[ignacio],FIRST:[satish],PHONE:[phone]],FAX:[(   [phone]    -],ADDRESS:[[address] [place] [address]]]

## 2020-11-04 NOTE — PROGRESS NOTE ADULT - REASON FOR ADMISSION
hypotension, acute blood loss

## 2020-11-04 NOTE — PROGRESS NOTE ADULT - ASSESSMENT
1. Strep sepsis, possible prosthetic valve endocarditis  2. anemia, acute on chronic, etiol. unclear  3. s/p TAVR 2019    Plan: TTE reviewed, no obvious vegetations. wean pressors as tolerated, cont abx  JAVIER Today

## 2020-11-04 NOTE — DIETITIAN INITIAL EVALUATION ADULT. - PERTINENT MEDS FT
MEDICATIONS  (STANDING):  calcium carbonate   1250 mG (OsCal) 1 Tablet(s) Oral three times a day  chlorhexidine 4% Liquid 1 Application(s) Topical <User Schedule>  levothyroxine 175 MICROGram(s) Oral daily  midodrine 10 milliGRAM(s) Oral every 8 hours  pantoprazole  Injectable 40 milliGRAM(s) IV Push every 12 hours  phenylephrine    Infusion 0.5 MICROgram(s)/kG/Min (12.8 mL/Hr) IV Continuous <Continuous>  piperacillin/tazobactam IVPB.. 3.375 Gram(s) IV Intermittent every 8 hours  vancomycin  IVPB 1000 milliGRAM(s) IV Intermittent every 12 hours    MEDICATIONS  (PRN):  acetaminophen   Tablet .. 650 milliGRAM(s) Oral every 6 hours PRN Temp greater or equal to 38C (100.4F), Mild Pain (1 - 3)

## 2020-11-04 NOTE — DISCHARGE NOTE PROVIDER - HOSPITAL COURSE
69 y/o F with PMHx of severe aortic stenosis s/p TAVR on Oct , 2019 (only on aspirin),  gastric bypass(on 2004), hypothyroidism came to the ED on 11/2/20 BIBEMs from home s/p a syncope episode. She reported during the last two weeks have been felling tired and weak. She denied hematuria, vaginal bleeding, hematochezia, fever, chills, abdominal pain and any other symptoms. At arrival to ED patient found  to be hypotensive with SBP 70-80s and DBP 40-50S. Labs showed hemoglobin 6.8, hypokalemia 2.1, hypocalcemia 5.5, lactate 1.1. Pt received 2.1L IVF and 1 PRBC with minimal improvement of blood pressure. Pt was started on Merlin-Synephrine and admitted to ICU for further management. CT abdomen performed and showed no active bleeding or infetious process. Chest X rat also performed and showed no acute pulmonary disease. BCx performed on 11/2/20 showed growth of streptococcus gallolyticus. TTE performed on 11/3/20 showed a LVEF 35-40% concerning of HF. Cardiology consulted and recommended JAVIER to rule out endocarditis.  JAVIER performed today by Dr La  showed large vegetation in the aortic valve and moderate aortic regurgitation. Pt is on      Vital Signs Last 24 Hrs  T(C): 36.6 (04 Nov 2020 09:40), Max: 36.9 (03 Nov 2020 20:00)  T(F): 97.8 (04 Nov 2020 09:40), Max: 98.4 (03 Nov 2020 20:00)  HR: 51 (04 Nov 2020 11:00) (47 - 65)  BP: 98/51 (04 Nov 2020 11:00) (78/42 - 111/48)  BP(mean): 64 (04 Nov 2020 11:00) (51 - 80)  RR: 18 (04 Nov 2020 11:00) (8 - 19)  SpO2: 94% (04 Nov 2020 11:00) (94% - 100%)      Physical Exam   Gen: NAD, comfortable, pale   HENT: atraumatic head and ears, no gross abnormalities of ears, mucous membranes moist, no oral lesions, neck supple without masses/goiter/lymphadenopathy  CV: RRR, nl s1/s2, 3/6 systolic murmur most prominent in  the 2nd right and left intercostal space   Pulm: nl respiratory effort, CTAB, no wheezes/crackles/rhonchi  Abd: normoactive bowel sounds in all 4 quadrants, soft, nontender, nondistended, no rebound, no guarding, no masses  Extremities: no pedal edema, pedal pulses palpable   Skin: nl warm and dry, no wounds   Neuro: A&Ox3, answering questions appropriately, PERRL, EOMI, face symmetric, sensation equal bilaterally in face, tongue midline, no dysarthria, 5/5 strength in upper and lower extremities bilaterally      RADIOLOGY:     JAVIER Complete w/Spect and Color (11.04.20 @ 09:27) >     Findings     Mitral Valve   not well visualized     Aortic Valve   An aortic bio prosthetic valve is present.Large vegetation is noted with   moderate to severe AR     Tricuspid Valve   NOt well visualized     Left Atrium   No thrombus seen in the left atrial appendage.     Left Ventricle   Not well visualized     Right Ventricle   Not well visualized       69 y/o F with PMHx of severe aortic stenosis s/p TAVR on Oct , 2019 (only on aspirin),  gastric bypass(on 2004), hypothyroidism came to the ED on 11/2/20 BIBEMs from home s/p a syncope episode. She reported during the last two weeks was felling tired and weak. She denied hematuria, vaginal bleeding, hematochezia, fever, chills, abdominal pain and any other symptoms. At arrival to ED patient found  to be hypotensive with SBP 70-80s and DBP 40-50S. Labs showed hemoglobin 6.8, hypokalemia 2.1, hypocalcemia 5.5,,  lactate 1.1. Pt received 2.1L IVF and 1 PRBC with minimal improvement of blood pressure. Pt was started on Merlin-Synephrine and admitted to ICU for further management. CT abdomen performed and showed no active bleeding or infetious process. Chest X ray also performed and showed no acute pulmonary disease. BCx performed on 11/2/20 showed growth of streptococcus gallolyticus. TTE performed on 11/3/20 showed a LVEF 35-40% concerning of HF. Cardiology consulted and recommended JAVIER to rule out endocarditis.  JAVIER performed today 11/4/20 by Dr La  showed large vegetation in the aortic valve and moderate aortic regurgitation. Pt received antibiotic treatment with Zosyn 3.375 IV every 8 hrs and Vancomycin 1g IV  BID by infectious disease recommendations. Pt also received multiple oral potassium supplements with some improvement. Pt continues with hypocalcemia and started in PO calcium supplement. Pt have remained hemodynamically stable, afebrile and off pressor and will be transfer to [facility] to continue management.    Vital Signs Last 24 Hrs  T(C): 36.6 (04 Nov 2020 09:40), Max: 36.9 (03 Nov 2020 20:00)  T(F): 97.8 (04 Nov 2020 09:40), Max: 98.4 (03 Nov 2020 20:00)  HR: 51 (04 Nov 2020 11:00) (47 - 65)  BP: 98/51 (04 Nov 2020 11:00) (78/42 - 111/48)  BP(mean): 64 (04 Nov 2020 11:00) (51 - 80)  RR: 18 (04 Nov 2020 11:00) (8 - 19)  SpO2: 94% (04 Nov 2020 11:00) (94% - 100%)      Physical Exam   Gen: NAD, comfortable, pale   HENT: atraumatic head and ears, no gross abnormalities of ears, mucous membranes moist, no oral lesions, neck supple without masses/goiter/lymphadenopathy  CV: RRR, nl s1/s2, 3/6 systolic murmur most prominent in  the 2nd right and left intercostal space   Pulm: nl respiratory effort, CTAB, no wheezes/crackles/rhonchi  Abd: normoactive bowel sounds in all 4 quadrants, soft, nontender, nondistended, no rebound, no guarding, no masses  Extremities: no pedal edema, pedal pulses palpable   Skin: nl warm and dry, no wounds   Neuro: A&Ox3, answering questions appropriately, PERRL, EOMI, face symmetric, sensation equal bilaterally in face, tongue midline, no dysarthria, 5/5 strength in upper and lower extremities bilaterally      RADIOLOGY:    CT Abdomen and Pelvis w/ IV Cont (11.02.20 @ 13:49) >    BOWEL: No bowel-related abnormality. Specifically, no evidence of acute diverticulitis. Normal ileocecal region. No bowel obstruction or bowel inflammation. Stable gastric bypass.  PERITONEUM: Small pelvic free fluid. No free air.  VESSELS:  Aortoiliac atherosclerosis without aneurysm.  RETROPERITONEUM/LYMPH NODES: No adenopathy.  ABDOMINAL WALL: Normal.  BONES: Noacute bony abnormality.    IMPRESSION:    No acute pathology.    TTE Echo Complete w/o Contrast w/ Doppler (11.03.20 @ 10:46) >     Impression     Summary     The left ventricle is normal in size, wall thickness.   Estimated left ventricular ejection fraction is 35-40 %.   The left atrium is moderately dilated.   Normal appearing right atrium.   Normal appearing right ventricle structure and function.   An aortic bio TAVR prosthetic valve is present.   Mild (1+) aortic regurgitation is present.   EA reversal of the mitral inflow consistent with reduced compliance of the   left ventricle.   Mild (1+) mitral regurgitation is present.   Moderate mitral annular calcification is present.   Mild (1+) tricuspid valve regurgitation is present.   Mild pulmonary hypertension.   No evidence of pericardial effusion.         JAVIER Complete w/Spect and Color (11.04.20 @ 09:27) >     Findings     Mitral Valve   not well visualized     Aortic Valve   An aortic bio prosthetic valve is present.Large vegetation is noted with   moderate to severe AR     Tricuspid Valve   NOt well visualized     Left Atrium   No thrombus seen in the left atrial appendage.     Left Ventricle   Not well visualized     Right Ventricle   Not well visualized

## 2020-11-04 NOTE — DIETITIAN INITIAL EVALUATION ADULT. - NAME AND PHONE
Pauly Antoine MA, RDN, CDN, [facility]  [phone] (office number)  [phone] (pager number)

## 2020-11-04 NOTE — DIETITIAN INITIAL EVALUATION ADULT. - OTHER INFO
69yo female with PMH significant for severe aortic stenosis s/p TAVR, gastric bypass, hypothyroidism presented s/p syncope episode after having watery diarrhea. with 2 weeks of weakness and fatigue found to be anemia and (+) strep gallolyticus sepsis and in septic shock.  acute aortic valve endocarditis related to TAVR.  Compensated HFrEF. s/p JAVIER.    *pt with hypocalcemia, hypokalemia, hypophosphatemia, and hypomagnesemia.  being monitored and repleted prn.  possible refeeding?

## 2020-11-05 LAB
CULTURE RESULTS: SIGNIFICANT CHANGE UP
CULTURE RESULTS: SIGNIFICANT CHANGE UP
ORGANISM # SPEC MICROSCOPIC CNT: SIGNIFICANT CHANGE UP
SPECIMEN SOURCE: SIGNIFICANT CHANGE UP

## 2020-11-06 LAB
-  GENTAMICIN: 6 — SIGNIFICANT CHANGE UP
METHOD TYPE: SIGNIFICANT CHANGE UP
ORGANISM # SPEC MICROSCOPIC CNT: SIGNIFICANT CHANGE UP
ORGANISM # SPEC MICROSCOPIC CNT: SIGNIFICANT CHANGE UP

## 2020-11-08 LAB
CULTURE RESULTS: SIGNIFICANT CHANGE UP
SPECIMEN SOURCE: SIGNIFICANT CHANGE UP

## 2020-11-10 DIAGNOSIS — E87.6 HYPOKALEMIA: ICD-10-CM
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
## 2020-11-10 DIAGNOSIS — Z79.82 LONG TERM (CURRENT) USE OF ASPIRIN: ICD-10-CM

## 2020-11-10 DIAGNOSIS — E20.8 OTHER HYPOPARATHYROIDISM: ICD-10-CM

## 2020-11-10 DIAGNOSIS — R65.21 SEVERE SEPSIS WITH SEPTIC SHOCK: ICD-10-CM
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
## 2020-11-10 DIAGNOSIS — I24.8 OTHER FORMS OF ACUTE ISCHEMIC HEART DISEASE: ICD-10-CM

## 2020-11-10 DIAGNOSIS — I50.20 UNSPECIFIED SYSTOLIC (CONGESTIVE) HEART FAILURE: ICD-10-CM

## 2020-11-10 DIAGNOSIS — T82.6XXA INFECTION AND INFLAMMATORY REACTION DUE TO CARDIAC VALVE PROSTHESIS, INITIAL ENCOUNTER: ICD-10-CM

## 2020-11-10 DIAGNOSIS — E03.9 HYPOTHYROIDISM, UNSPECIFIED: ICD-10-CM

## 2020-11-10 DIAGNOSIS — A40.8 OTHER STREPTOCOCCAL SEPSIS: ICD-10-CM

## 2020-11-10 DIAGNOSIS — Z98.84 BARIATRIC SURGERY STATUS: ICD-10-CM

## 2020-11-10 DIAGNOSIS — R19.7 DIARRHEA, UNSPECIFIED: ICD-10-CM

## 2020-11-10 DIAGNOSIS — M17.12 UNILATERAL PRIMARY OSTEOARTHRITIS, LEFT KNEE: ICD-10-CM

## 2020-11-10 DIAGNOSIS — D50.9 IRON DEFICIENCY ANEMIA, UNSPECIFIED: ICD-10-CM

## 2021-06-25 NOTE — ED ADULT NURSE NOTE - NS ED NOTE ABUSE SUSPICION NEGLECT YN
Chief Complaint   Patient presents with    Form Completion     complete disability forms     Visit Vitals  BP (!) 144/82 (BP 1 Location: Left upper arm, BP Patient Position: Sitting, BP Cuff Size: Adult)   Pulse 82   Temp 97.7 °F (36.5 °C) (Temporal)   Resp 17   Ht 5' 4\" (1.626 m)   Wt 215 lb 8 oz (97.8 kg)   LMP 06/13/2011   SpO2 97%   BMI 36.99 kg/m²     1. Have you been to the ER, urgent care clinic since your last visit? Hospitalized since your last visit? No    2. Have you seen or consulted any other health care providers outside of the [address] [place] [address] since your last visit? Include any pap smears or colon screening.  No No

## 2022-11-02 ENCOUNTER — OFFICE (OUTPATIENT)
Dept: URBAN - METROPOLITAN AREA CLINIC 102 | Facility: CLINIC | Age: 72
Setting detail: OPHTHALMOLOGY
End: 2022-11-02
Payer: MEDICARE

## 2022-11-02 DIAGNOSIS — H02.831: ICD-10-CM

## 2022-11-02 DIAGNOSIS — H43.391: ICD-10-CM

## 2022-11-02 DIAGNOSIS — H02.31: ICD-10-CM

## 2022-11-02 DIAGNOSIS — H01.004: ICD-10-CM

## 2022-11-02 DIAGNOSIS — H43.812: ICD-10-CM

## 2022-11-02 DIAGNOSIS — H02.34: ICD-10-CM

## 2022-11-02 DIAGNOSIS — H02.834: ICD-10-CM

## 2022-11-02 DIAGNOSIS — H10.45: ICD-10-CM

## 2022-11-02 DIAGNOSIS — H01.002: ICD-10-CM

## 2022-11-02 DIAGNOSIS — H01.005: ICD-10-CM

## 2022-11-02 DIAGNOSIS — Z96.1: ICD-10-CM

## 2022-11-02 DIAGNOSIS — H01.001: ICD-10-CM

## 2022-11-02 PROCEDURE — 99213 OFFICE O/P EST LOW 20 MIN: CPT | Performed by: OPHTHALMOLOGY

## 2022-11-02 ASSESSMENT — SPHEQUIV_DERIVED
OD_SPHEQUIV: -0.5
OS_SPHEQUIV: -0.375

## 2022-11-02 ASSESSMENT — LID EXAM ASSESSMENTS
OS_COMMENTS: 1+ INF SCLERAL SHO
OD_COMMENTS: 1+ INF SCLERAL SHO
OS_LAXITY: 1+
OD_LAXITY: 1+
OD_INF_SCLERAL_SHOW: 1+
OD_BLEPHARITIS: RLL RUL 1+
OD_FRONTALIS_USE: 1+
OS_BLEPHARITIS: LLL LUL 1+

## 2022-11-02 ASSESSMENT — CONFRONTATIONAL VISUAL FIELD TEST (CVF)
OD_FINDINGS: FULL
OS_FINDINGS: FULL

## 2022-11-02 ASSESSMENT — TEAR BREAK UP TIME (TBUT)
OS_TBUT: T
OD_TBUT: 1+

## 2022-11-02 ASSESSMENT — REFRACTION_AUTOREFRACTION
OD_CYLINDER: -0.50
OD_AXIS: 091
OD_SPHERE: -0.25
OS_SPHERE: -0.25
OS_CYLINDER: -0.25
OS_AXIS: 067

## 2022-11-02 ASSESSMENT — AXIALLENGTH_DERIVED
OS_AL: 23.6003
OD_AL: 23.6953

## 2022-11-02 ASSESSMENT — KERATOMETRY
OD_K2POWER_DIOPTERS: 43.75
OD_K1POWER_DIOPTERS: 43.75
OS_K2POWER_DIOPTERS: 44.00
OS_K1POWER_DIOPTERS: 43.75
OD_AXISANGLE_DEGREES: 090
OS_AXISANGLE_DEGREES: 081

## 2022-11-02 ASSESSMENT — LID POSITION - COMMENTS
OD_COMMENTS: INCISION INTACT
OS_COMMENTS: INCISION INTACT

## 2022-11-02 ASSESSMENT — VISUAL ACUITY
OD_BCVA: 20/20-
OS_BCVA: 20/25+

## 2022-12-12 ENCOUNTER — OFFICE (OUTPATIENT)
Dept: URBAN - METROPOLITAN AREA CLINIC 102 | Facility: CLINIC | Age: 72
Setting detail: OPHTHALMOLOGY
End: 2022-12-12
Payer: MEDICARE

## 2022-12-12 DIAGNOSIS — H40.011: ICD-10-CM

## 2022-12-12 DIAGNOSIS — H02.31: ICD-10-CM

## 2022-12-12 DIAGNOSIS — H43.812: ICD-10-CM

## 2022-12-12 DIAGNOSIS — H43.391: ICD-10-CM

## 2022-12-12 DIAGNOSIS — H02.834: ICD-10-CM

## 2022-12-12 DIAGNOSIS — H02.831: ICD-10-CM

## 2022-12-12 DIAGNOSIS — H02.34: ICD-10-CM

## 2022-12-12 DIAGNOSIS — H10.45: ICD-10-CM

## 2022-12-12 DIAGNOSIS — Z96.1: ICD-10-CM

## 2022-12-12 PROCEDURE — 92014 COMPRE OPH EXAM EST PT 1/>: CPT | Performed by: OPHTHALMOLOGY

## 2022-12-12 PROCEDURE — 92250 FUNDUS PHOTOGRAPHY W/I&R: CPT | Performed by: OPHTHALMOLOGY

## 2022-12-12 ASSESSMENT — CONFRONTATIONAL VISUAL FIELD TEST (CVF)
OS_FINDINGS: FULL
OD_FINDINGS: FULL

## 2022-12-12 ASSESSMENT — LID POSITION - COMMENTS
OD_COMMENTS: INCISION INTACT
OS_COMMENTS: INCISION INTACT

## 2022-12-12 ASSESSMENT — PACHYMETRY
OD_CT_UM: 577
OS_CT_CORRECTION: -1
OD_CT_CORRECTION: -2
OS_CT_UM: 567

## 2022-12-12 ASSESSMENT — VISUAL ACUITY
OS_BCVA: 20/20-1
OD_BCVA: 20/25

## 2022-12-12 ASSESSMENT — REFRACTION_AUTOREFRACTION
OS_SPHERE: -0.25
OD_CYLINDER: -0.75
OS_CYLINDER: -0.25
OD_SPHERE: 0.00
OS_AXIS: 070
OD_AXIS: 99

## 2022-12-12 ASSESSMENT — LID EXAM ASSESSMENTS
OS_BLEPHARITIS: LLL LUL 1+
OD_COMMENTS: 1+ INF SCLERAL SHO
OD_INF_SCLERAL_SHOW: 1+
OD_LAXITY: 1+
OS_COMMENTS: 1+ INF SCLERAL SHO
OD_BLEPHARITIS: RLL RUL 1+
OS_LAXITY: 1+
OD_FRONTALIS_USE: 1+

## 2022-12-12 ASSESSMENT — TEAR BREAK UP TIME (TBUT)
OS_TBUT: T
OD_TBUT: 1+

## 2022-12-12 ASSESSMENT — KERATOMETRY
OS_AXISANGLE_DEGREES: 144
OS_K2POWER_DIOPTERS: 44.00
OD_K2POWER_DIOPTERS: 44.00
OD_K1POWER_DIOPTERS: 43.75
OS_K1POWER_DIOPTERS: 43.75
OD_AXISANGLE_DEGREES: 44

## 2022-12-12 ASSESSMENT — TONOMETRY
OD_IOP_MMHG: 16
OD_IOP_MMHG: 18
OS_IOP_MMHG: 20
OS_IOP_MMHG: 16

## 2022-12-12 ASSESSMENT — AXIALLENGTH_DERIVED
OS_AL: 23.6003
OD_AL: 23.6003

## 2022-12-12 ASSESSMENT — SPHEQUIV_DERIVED
OD_SPHEQUIV: -0.375
OS_SPHEQUIV: -0.375

## 2023-01-30 NOTE — ED ADULT NURSE NOTE - NSHISCREENINGQ1_ED_A_ED
Pt arrived on the unit at 1449. He is alert and oriented X4. He appears sad and worried. He states his family thinks he is worse than he is. His aunt Cipriano Alba states his depression has been worsening for about a year. She states the pt goes to work or stays in his room drinking. She believes he has not showered for a month. He appears disheveled with poor hygiene. She states he has a hx of ADHD and was medicated, but his father sold his meds, so he was never controlled. She states he continues to be \"scatterbrained\" and often starts to cook and walks away to do other things. He has turned the oven on and gone to bed. He has several times smoked the house up this way. She states he is difficult to keep on task and cooking a simple meal takes him hours. She states he is manipulative and blames others when anything goes wrong. He will state he is going to kill himself to get his way, but does not believe he would do it. No

## 2023-06-19 NOTE — DIETITIAN INITIAL EVALUATION ADULT. - CALCULATED TO (ML/KG)
Bed: 09  Expected date:   Expected time:   Means of arrival:   Comments:  Dalton 64/M FULL ARREST   [id number]

## 2023-09-05 ENCOUNTER — OFFICE (OUTPATIENT)
Dept: URBAN - METROPOLITAN AREA CLINIC 102 | Facility: CLINIC | Age: 73
Setting detail: OPHTHALMOLOGY
End: 2023-09-05
Payer: MEDICARE

## 2023-09-05 DIAGNOSIS — H02.31: ICD-10-CM

## 2023-09-05 DIAGNOSIS — H40.011: ICD-10-CM

## 2023-09-05 DIAGNOSIS — H02.834: ICD-10-CM

## 2023-09-05 DIAGNOSIS — H02.831: ICD-10-CM

## 2023-09-05 DIAGNOSIS — H10.45: ICD-10-CM

## 2023-09-05 DIAGNOSIS — H43.812: ICD-10-CM

## 2023-09-05 DIAGNOSIS — Z96.1: ICD-10-CM

## 2023-09-05 DIAGNOSIS — H02.34: ICD-10-CM

## 2023-09-05 DIAGNOSIS — H43.391: ICD-10-CM

## 2023-09-05 PROCEDURE — 92014 COMPRE OPH EXAM EST PT 1/>: CPT | Performed by: OPHTHALMOLOGY

## 2023-09-05 PROCEDURE — 92133 CPTRZD OPH DX IMG PST SGM ON: CPT | Performed by: OPHTHALMOLOGY

## 2023-09-05 PROCEDURE — 92083 EXTENDED VISUAL FIELD XM: CPT | Performed by: OPHTHALMOLOGY

## 2023-09-05 ASSESSMENT — SPHEQUIV_DERIVED
OS_SPHEQUIV: -0.625
OD_SPHEQUIV: -0.75

## 2023-09-05 ASSESSMENT — REFRACTION_AUTOREFRACTION
OS_CYLINDER: -0.25
OS_SPHERE: -0.50
OD_CYLINDER: -0.50
OS_AXIS: 047
OD_SPHERE: -0.50
OD_AXIS: 086

## 2023-09-05 ASSESSMENT — LID POSITION - COMMENTS
OS_COMMENTS: INCISION INTACT
OD_COMMENTS: INCISION INTACT

## 2023-09-05 ASSESSMENT — PACHYMETRY
OD_CT_CORRECTION: -2
OS_CT_UM: 567
OS_CT_CORRECTION: -1
OD_CT_UM: 577

## 2023-09-05 ASSESSMENT — LID EXAM ASSESSMENTS
OD_BLEPHARITIS: RLL RUL 1+
OD_FRONTALIS_USE: 1+
OD_COMMENTS: 1+ INF SCLERAL SHO
OS_LAXITY: 1+
OD_LAXITY: 1+
OD_INF_SCLERAL_SHOW: 1+
OS_COMMENTS: 1+ INF SCLERAL SHO
OS_BLEPHARITIS: LLL LUL 1+

## 2023-09-05 ASSESSMENT — KERATOMETRY
OS_K1POWER_DIOPTERS: 43.75
OD_AXISANGLE_DEGREES: 103
OS_K2POWER_DIOPTERS: 44.00
OD_K2POWER_DIOPTERS: 44.25
OD_K1POWER_DIOPTERS: 44.00
OS_AXISANGLE_DEGREES: 096
METHOD_AUTO_MANUAL: AUTO

## 2023-09-05 ASSESSMENT — TONOMETRY
OS_IOP_MMHG: 15
OD_IOP_MMHG: 15
OS_IOP_MMHG: 16
OD_IOP_MMHG: 19

## 2023-09-05 ASSESSMENT — CONFRONTATIONAL VISUAL FIELD TEST (CVF)
OS_FINDINGS: FULL
OD_FINDINGS: FULL

## 2023-09-05 ASSESSMENT — AXIALLENGTH_DERIVED
OS_AL: 23.6981
OD_AL: 23.6547

## 2023-09-05 ASSESSMENT — TEAR BREAK UP TIME (TBUT)
OS_TBUT: T
OD_TBUT: 1+

## 2023-09-05 ASSESSMENT — VISUAL ACUITY
OS_BCVA: 20/20
OD_BCVA: 20/25+1

## 2023-11-19 ENCOUNTER — EMERGENCY (EMERGENCY)
Facility: HOSPITAL | Age: 73
LOS: 0 days | Discharge: HOME | End: 2023-11-19
Attending: EMERGENCY MEDICINE
Payer: MEDICARE

## 2023-11-19 VITALS — WEIGHT: 151.02 LBS | HEIGHT: 68.5 IN

## 2023-11-19 VITALS
TEMPERATURE: 98 F | OXYGEN SATURATION: 100 % | RESPIRATION RATE: 15 BRPM | HEART RATE: 75 BPM | SYSTOLIC BLOOD PRESSURE: 110 MMHG | DIASTOLIC BLOOD PRESSURE: 74 MMHG

## 2023-11-19 DIAGNOSIS — L03.115 CELLULITIS OF RIGHT LOWER LIMB: ICD-10-CM

## 2023-11-19 DIAGNOSIS — Z86.79 PERSONAL HISTORY OF OTHER DISEASES OF THE CIRCULATORY SYSTEM: ICD-10-CM

## 2023-11-19 DIAGNOSIS — Z86.39 PERSONAL HISTORY OF OTHER ENDOCRINE, NUTRITIONAL AND METABOLIC DISEASE: ICD-10-CM

## 2023-11-19 DIAGNOSIS — M19.90 UNSPECIFIED OSTEOARTHRITIS, UNSPECIFIED SITE: ICD-10-CM

## 2023-11-19 DIAGNOSIS — Z79.82 LONG TERM (CURRENT) USE OF ASPIRIN: ICD-10-CM

## 2023-11-19 DIAGNOSIS — L03.116 CELLULITIS OF LEFT LOWER LIMB: ICD-10-CM

## 2023-11-19 DIAGNOSIS — I50.9 HEART FAILURE, UNSPECIFIED: ICD-10-CM

## 2023-11-19 DIAGNOSIS — R79.89 OTHER SPECIFIED ABNORMAL FINDINGS OF BLOOD CHEMISTRY: ICD-10-CM

## 2023-11-19 DIAGNOSIS — Z98.84 BARIATRIC SURGERY STATUS: ICD-10-CM

## 2023-11-19 DIAGNOSIS — Z95.4 PRESENCE OF OTHER HEART-VALVE REPLACEMENT: ICD-10-CM

## 2023-11-19 DIAGNOSIS — M79.89 OTHER SPECIFIED SOFT TISSUE DISORDERS: ICD-10-CM

## 2023-11-19 LAB
ALBUMIN SERPL ELPH-MCNC: 3.1 G/DL — LOW (ref 3.3–5)
ALBUMIN SERPL ELPH-MCNC: 3.1 G/DL — LOW (ref 3.3–5)
ALP SERPL-CCNC: 58 U/L — SIGNIFICANT CHANGE UP (ref 40–120)
ALP SERPL-CCNC: 58 U/L — SIGNIFICANT CHANGE UP (ref 40–120)
ALT FLD-CCNC: 14 U/L — SIGNIFICANT CHANGE UP (ref 12–78)
ALT FLD-CCNC: 14 U/L — SIGNIFICANT CHANGE UP (ref 12–78)
ANION GAP SERPL CALC-SCNC: 5 MMOL/L — SIGNIFICANT CHANGE UP (ref 5–17)
ANION GAP SERPL CALC-SCNC: 5 MMOL/L — SIGNIFICANT CHANGE UP (ref 5–17)
AST SERPL-CCNC: 9 U/L — LOW (ref 15–37)
AST SERPL-CCNC: 9 U/L — LOW (ref 15–37)
BASOPHILS # BLD AUTO: 0.07 K/UL — SIGNIFICANT CHANGE UP (ref 0–0.2)
BASOPHILS # BLD AUTO: 0.07 K/UL — SIGNIFICANT CHANGE UP (ref 0–0.2)
BASOPHILS NFR BLD AUTO: 1 % — SIGNIFICANT CHANGE UP (ref 0–2)
BASOPHILS NFR BLD AUTO: 1 % — SIGNIFICANT CHANGE UP (ref 0–2)
BILIRUB SERPL-MCNC: 0.3 MG/DL — SIGNIFICANT CHANGE UP (ref 0.2–1.2)
BILIRUB SERPL-MCNC: 0.3 MG/DL — SIGNIFICANT CHANGE UP (ref 0.2–1.2)
BUN SERPL-MCNC: 21 MG/DL — SIGNIFICANT CHANGE UP (ref 7–23)
BUN SERPL-MCNC: 21 MG/DL — SIGNIFICANT CHANGE UP (ref 7–23)
CALCIUM SERPL-MCNC: 8.5 MG/DL — SIGNIFICANT CHANGE UP (ref 8.5–10.1)
CALCIUM SERPL-MCNC: 8.5 MG/DL — SIGNIFICANT CHANGE UP (ref 8.5–10.1)
CHLORIDE SERPL-SCNC: 105 MMOL/L — SIGNIFICANT CHANGE UP (ref 96–108)
CHLORIDE SERPL-SCNC: 105 MMOL/L — SIGNIFICANT CHANGE UP (ref 96–108)
CO2 SERPL-SCNC: 28 MMOL/L — SIGNIFICANT CHANGE UP (ref 22–31)
CO2 SERPL-SCNC: 28 MMOL/L — SIGNIFICANT CHANGE UP (ref 22–31)
CREAT SERPL-MCNC: 0.93 MG/DL — SIGNIFICANT CHANGE UP (ref 0.5–1.3)
CREAT SERPL-MCNC: 0.93 MG/DL — SIGNIFICANT CHANGE UP (ref 0.5–1.3)
EGFR: 64 ML/MIN/1.73M2 — SIGNIFICANT CHANGE UP
EGFR: 64 ML/MIN/1.73M2 — SIGNIFICANT CHANGE UP
EOSINOPHIL # BLD AUTO: 0.15 K/UL — SIGNIFICANT CHANGE UP (ref 0–0.5)
EOSINOPHIL # BLD AUTO: 0.15 K/UL — SIGNIFICANT CHANGE UP (ref 0–0.5)
EOSINOPHIL NFR BLD AUTO: 2.2 % — SIGNIFICANT CHANGE UP (ref 0–6)
EOSINOPHIL NFR BLD AUTO: 2.2 % — SIGNIFICANT CHANGE UP (ref 0–6)
GLUCOSE SERPL-MCNC: 111 MG/DL — HIGH (ref 70–99)
GLUCOSE SERPL-MCNC: 111 MG/DL — HIGH (ref 70–99)
HCT VFR BLD CALC: 34.8 % — SIGNIFICANT CHANGE UP (ref 34.5–45)
HCT VFR BLD CALC: 34.8 % — SIGNIFICANT CHANGE UP (ref 34.5–45)
HGB BLD-MCNC: 11 G/DL — LOW (ref 11.5–15.5)
HGB BLD-MCNC: 11 G/DL — LOW (ref 11.5–15.5)
IMM GRANULOCYTES NFR BLD AUTO: 0.3 % — SIGNIFICANT CHANGE UP (ref 0–0.9)
IMM GRANULOCYTES NFR BLD AUTO: 0.3 % — SIGNIFICANT CHANGE UP (ref 0–0.9)
LYMPHOCYTES # BLD AUTO: 1.02 K/UL — SIGNIFICANT CHANGE UP (ref 1–3.3)
LYMPHOCYTES # BLD AUTO: 1.02 K/UL — SIGNIFICANT CHANGE UP (ref 1–3.3)
LYMPHOCYTES # BLD AUTO: 14.7 % — SIGNIFICANT CHANGE UP (ref 13–44)
LYMPHOCYTES # BLD AUTO: 14.7 % — SIGNIFICANT CHANGE UP (ref 13–44)
MCHC RBC-ENTMCNC: 28.9 PG — SIGNIFICANT CHANGE UP (ref 27–34)
MCHC RBC-ENTMCNC: 28.9 PG — SIGNIFICANT CHANGE UP (ref 27–34)
MCHC RBC-ENTMCNC: 31.6 GM/DL — LOW (ref 32–36)
MCHC RBC-ENTMCNC: 31.6 GM/DL — LOW (ref 32–36)
MCV RBC AUTO: 91.3 FL — SIGNIFICANT CHANGE UP (ref 80–100)
MCV RBC AUTO: 91.3 FL — SIGNIFICANT CHANGE UP (ref 80–100)
MONOCYTES # BLD AUTO: 0.59 K/UL — SIGNIFICANT CHANGE UP (ref 0–0.9)
MONOCYTES # BLD AUTO: 0.59 K/UL — SIGNIFICANT CHANGE UP (ref 0–0.9)
MONOCYTES NFR BLD AUTO: 8.5 % — SIGNIFICANT CHANGE UP (ref 2–14)
MONOCYTES NFR BLD AUTO: 8.5 % — SIGNIFICANT CHANGE UP (ref 2–14)
NEUTROPHILS # BLD AUTO: 5.1 K/UL — SIGNIFICANT CHANGE UP (ref 1.8–7.4)
NEUTROPHILS # BLD AUTO: 5.1 K/UL — SIGNIFICANT CHANGE UP (ref 1.8–7.4)
NEUTROPHILS NFR BLD AUTO: 73.3 % — SIGNIFICANT CHANGE UP (ref 43–77)
NEUTROPHILS NFR BLD AUTO: 73.3 % — SIGNIFICANT CHANGE UP (ref 43–77)
NT-PROBNP SERPL-SCNC: 671 PG/ML — HIGH (ref 0–450)
NT-PROBNP SERPL-SCNC: 671 PG/ML — HIGH (ref 0–450)
PLATELET # BLD AUTO: 323 K/UL — SIGNIFICANT CHANGE UP (ref 150–400)
PLATELET # BLD AUTO: 323 K/UL — SIGNIFICANT CHANGE UP (ref 150–400)
POTASSIUM SERPL-MCNC: 3.8 MMOL/L — SIGNIFICANT CHANGE UP (ref 3.5–5.3)
POTASSIUM SERPL-MCNC: 3.8 MMOL/L — SIGNIFICANT CHANGE UP (ref 3.5–5.3)
POTASSIUM SERPL-SCNC: 3.8 MMOL/L — SIGNIFICANT CHANGE UP (ref 3.5–5.3)
POTASSIUM SERPL-SCNC: 3.8 MMOL/L — SIGNIFICANT CHANGE UP (ref 3.5–5.3)
PROT SERPL-MCNC: 6.7 GM/DL — SIGNIFICANT CHANGE UP (ref 6–8.3)
PROT SERPL-MCNC: 6.7 GM/DL — SIGNIFICANT CHANGE UP (ref 6–8.3)
RBC # BLD: 3.81 M/UL — SIGNIFICANT CHANGE UP (ref 3.8–5.2)
RBC # BLD: 3.81 M/UL — SIGNIFICANT CHANGE UP (ref 3.8–5.2)
RBC # FLD: 15 % — HIGH (ref 10.3–14.5)
RBC # FLD: 15 % — HIGH (ref 10.3–14.5)
SODIUM SERPL-SCNC: 138 MMOL/L — SIGNIFICANT CHANGE UP (ref 135–145)
SODIUM SERPL-SCNC: 138 MMOL/L — SIGNIFICANT CHANGE UP (ref 135–145)
TROPONIN I, HIGH SENSITIVITY RESULT: 8.2 NG/L — SIGNIFICANT CHANGE UP
TROPONIN I, HIGH SENSITIVITY RESULT: 8.2 NG/L — SIGNIFICANT CHANGE UP
WBC # BLD: 6.95 K/UL — SIGNIFICANT CHANGE UP (ref 3.8–10.5)
WBC # BLD: 6.95 K/UL — SIGNIFICANT CHANGE UP (ref 3.8–10.5)
WBC # FLD AUTO: 6.95 K/UL — SIGNIFICANT CHANGE UP (ref 3.8–10.5)
WBC # FLD AUTO: 6.95 K/UL — SIGNIFICANT CHANGE UP (ref 3.8–10.5)

## 2023-11-19 PROCEDURE — 96374 THER/PROPH/DIAG INJ IV PUSH: CPT

## 2023-11-19 PROCEDURE — 36415 COLL VENOUS BLD VENIPUNCTURE: CPT

## 2023-11-19 PROCEDURE — 80053 COMPREHEN METABOLIC PANEL: CPT

## 2023-11-19 PROCEDURE — 71046 X-RAY EXAM CHEST 2 VIEWS: CPT | Mod: 26

## 2023-11-19 PROCEDURE — 73630 X-RAY EXAM OF FOOT: CPT | Mod: 26,RT

## 2023-11-19 PROCEDURE — 71046 X-RAY EXAM CHEST 2 VIEWS: CPT

## 2023-11-19 PROCEDURE — 99285 EMERGENCY DEPT VISIT HI MDM: CPT | Mod: 25

## 2023-11-19 PROCEDURE — 99285 EMERGENCY DEPT VISIT HI MDM: CPT | Mod: FS

## 2023-11-19 PROCEDURE — 84484 ASSAY OF TROPONIN QUANT: CPT

## 2023-11-19 PROCEDURE — 73630 X-RAY EXAM OF FOOT: CPT | Mod: RT

## 2023-11-19 PROCEDURE — 85025 COMPLETE CBC W/AUTO DIFF WBC: CPT

## 2023-11-19 PROCEDURE — 83880 ASSAY OF NATRIURETIC PEPTIDE: CPT

## 2023-11-19 RX ORDER — ACETAMINOPHEN 500 MG
1000 TABLET ORAL ONCE
Refills: 0 | Status: COMPLETED | OUTPATIENT
Start: 2023-11-19 | End: 2023-11-19

## 2023-11-19 RX ADMIN — Medication 400 MILLIGRAM(S): at 13:48

## 2023-11-19 NOTE — ED STATDOCS - PROGRESS NOTE DETAILS
labs and imaging reviewed with patient. Mild elevated in pro-bnp. No level available for comparison. Xrs negative. Will treat as cellulitis with doxycycline. Advised close cardiology follow up for additional assessment. - Marshall White PA-C

## 2023-11-19 NOTE — ED STATDOCS - NS_ATTENDINGSCRIBE_ED_ALL_ED
I personally performed the service described in the documentation recorded by the scribe in my presence, and it accurately and completely records my words and actions. Purse String (Simple) Text: Given the location of the defect and the characteristics of the surrounding skin a purse string closure was deemed most appropriate.  Undermining was performed circumfirentially around the surgical defect.  A purse string suture was then placed and tightened.

## 2023-11-19 NOTE — ED ADULT NURSE NOTE - NSFALLUNIVINTERV_ED_ALL_ED
Bed/Stretcher in lowest position, wheels locked, appropriate side rails in place/Call bell, personal items and telephone in reach/Instruct patient to call for assistance before getting out of bed/chair/stretcher/Non-slip footwear applied when patient is off stretcher/[place] to call system/Physically safe environment - no spills, clutter or unnecessary equipment/Purposeful proactive rounding/Room/bathroom lighting operational, light cord in reach

## 2023-11-19 NOTE — ED ADULT TRIAGE NOTE - CHIEF COMPLAINT QUOTE
Pt presents to ED c/o b/l feet swelling, right foot worse than left foot. Pt states "For the past 2 wks my feet has been swelling but now my right foot is red and hot to touch." Denies fever/ chills.

## 2023-11-19 NOTE — ED STATDOCS - PHYSICAL EXAMINATION
Constitutional: NAD AAOx3  Eyes: PERRLA EOMI  Head: Normocephalic atraumatic  Mouth: MMM  Cardiac: regular rate   Resp: Lungs CTAB  GI: Abd s/nt/nd, no rebound or guarding.  Neuro: awake, alert, moving all extremities, cranial nerves 2-12 intact, sensation intact, no dysmetria.  Skin: No rashes   MSK: b/l swelling of feet, right foot w/ mild erythema

## 2023-11-19 NOTE — ED STATDOCS - OBJECTIVE STATEMENT
69 y/o female with PMHx of severe aortic stenosis s/p TAVR on Oct , 2019 (only on aspirin), OA, gastric bypass 2004, and hypothyroidism presents to the ED for b/l feet swelling, right worse than left x2 weeks. Pt saw her MD x3 days regarding this issue w/ a negative workup including b/l US. Pt reports her right foot is now red and warm to touch. Denies fevers or chills. No other complaints at this time. Cardio: Dr. La.

## 2023-11-19 NOTE — ED STATDOCS - PATIENT PORTAL LINK FT
You can access the FollowMyHealth Patient Portal offered by [facility] by registering at the following website: [email or web address]. By joining [employer]’s FollowMyHealth portal, you will also be able to view your health information using other applications (apps) compatible with our system.

## 2023-11-19 NOTE — ED ADULT NURSE NOTE - OBJECTIVE STATEMENT
patient c/o bilateral feet swelling, right worse than left x2 weeks. Pt saw her MD x3 days regarding this issue w/ a negative workup including b/l US. Pt reports her right foot is now red and warm to touch. Denies fevers or chills.

## 2023-11-19 NOTE — ED STATDOCS - NSFOLLOWUPINSTRUCTIONS_ED_ALL_ED_FT
Cellulitis    WHAT YOU NEED TO KNOW:    Cellulitis is a skin infection caused by bacteria. Cellulitis may go away on its own or you may need treatment. Your healthcare provider may draw a [place] around the outside edges of your cellulitis. If your cellulitis spreads, your healthcare provider will see it outside of the [place]. Cellulitis          DISCHARGE INSTRUCTIONS:    Call 911 if:     You have sudden trouble breathing or chest pain.        Return to the emergency department if:     Your wound gets larger and more painful.       You feel a crackling under your skin when you touch it.      You have purple dots or bumps on your skin, or you see bleeding under your skin.      You have new swelling and pain in your legs.      The red, warm, swollen area gets larger.      You see red streaks coming from the infected area.    Contact your healthcare provider if:     You have a fever.      Your fever or pain does not go away or gets worse.      The area does not get smaller after 2 days of antibiotics.      Your skin is flaking or peeling off.      You have questions or concerns about your condition or care.    Medicines:     Antibiotics help treat the bacterial infection.       NSAIDs, such as ibuprofen, help decrease swelling, pain, and fever. NSAIDs can cause stomach bleeding or kidney problems in certain people. If you take blood thinner medicine, always ask if NSAIDs are safe for you. Always read the medicine label and follow directions. Do not give these medicines to children under 6 months of age without direction from your child's healthcare provider.      Acetaminophen decreases pain and fever. It is available without a doctor's order. Ask how much to take and how often to take it. Follow directions. Read the labels of all other medicines you are using to see if they also contain acetaminophen, or ask your doctor or pharmacist. Acetaminophen can cause liver damage if not taken correctly. Do not use more than 4 grams (4,000 milligrams) total of acetaminophen in one day.       Take your medicine as directed. Contact your healthcare provider if you think your medicine is not helping or if you have side effects. Tell him or her if you are allergic to any medicine. Keep a list of the medicines, vitamins, and herbs you take. Include the amounts, and when and why you take them. Bring the list or the pill bottles to follow-up visits. Carry your medicine list with you in case of an emergency.    Self-care:     Elevate the area above the level of your heart as often as you can. This will help decrease swelling and pain. Prop the area on pillows or blankets to keep it elevated comfortably.       Clean the area daily until the wound scabs over. Gently wash the area with soap and water. Pat dry. Use dressings as directed.       Place cool or warm, wet cloths on the area as directed. Use clean cloths and clean water. Leave it on the area until the cloth is room temperature. Pat the area dry with a clean, dry cloth. The cloths may help decrease pain.     Prevent cellulitis:     Do not scratch bug bites or areas of injury. You increase your risk for cellulitis by scratching these areas.       Do not share personal items, such as towels, clothing, and razors.       Clean exercise equipment with germ-killing  before and after you use it.      Wash your hands often. Use soap and water. Wash your hands after you use the bathroom, change a child's diapers, or sneeze. Wash your hands before you prepare or eat food. Use lotion to prevent dry, cracked skin. Handwashing           Wear pressure stockings as directed. You may be told to wear the stockings if you have peripheral edema. The stockings improve blood flow and decrease swelling.      Treat athlete’s foot. This can help prevent the spread of a bacterial skin infection.    Follow up with your healthcare provider within 3 days, or as directed: Your healthcare provider will check if your cellulitis is getting better. You may need different medicine. Write down your questions so you remember to ask them during your visits.

## 2023-11-19 NOTE — ED STATDOCS - ATTENDING APP SHARED VISIT CONTRIBUTION OF CARE
I, Jill Olivares MD, performed the initial face to face bedside interview with this patient regarding history of present illness, review of symptoms and relevant past medical, social and family history.  I completed an independent physical examination.  I was the initial provider who evaluated this patient. I have signed out the follow up of any pending tests (i.e. labs, radiological studies) to the ACP.  I have communicated the patient’s plan of care and disposition with the ACP.  The history, relevant review of systems, past medical and surgical history, medical decision making, and physical examination was documented by the scribe in my presence and I attest to the accuracy of the documentation.

## 2023-12-13 ENCOUNTER — APPOINTMENT (OUTPATIENT)
Dept: THORACIC SURGERY | Facility: CLINIC | Age: 73
End: 2023-12-13
Payer: MEDICARE

## 2023-12-13 VITALS
WEIGHT: 152 LBS | SYSTOLIC BLOOD PRESSURE: 117 MMHG | HEART RATE: 83 BPM | BODY MASS INDEX: 23.04 KG/M2 | OXYGEN SATURATION: 96 % | DIASTOLIC BLOOD PRESSURE: 73 MMHG | HEIGHT: 68 IN

## 2023-12-13 DIAGNOSIS — Z86.79 PERSONAL HISTORY OF OTHER DISEASES OF THE CIRCULATORY SYSTEM: ICD-10-CM

## 2023-12-13 DIAGNOSIS — Z80.7 FAMILY HISTORY OF OTHER MALIGNANT NEOPLASMS OF LYMPHOID, HEMATOPOIETIC AND RELATED TISSUES: ICD-10-CM

## 2023-12-13 DIAGNOSIS — Z87.898 PERSONAL HISTORY OF OTHER SPECIFIED CONDITIONS: ICD-10-CM

## 2023-12-13 DIAGNOSIS — Z87.891 PERSONAL HISTORY OF NICOTINE DEPENDENCE: ICD-10-CM

## 2023-12-13 PROCEDURE — 99205 OFFICE O/P NEW HI 60 MIN: CPT

## 2023-12-13 RX ORDER — ASPIRIN 81 MG
81 TABLET, DELAYED RELEASE (ENTERIC COATED) ORAL
Refills: 0 | Status: ACTIVE | COMMUNITY

## 2023-12-13 NOTE — ASSESSMENT
[FreeTextEntry1] : Ms. RAF VALERIO, 73-year-old female, former smoker (2 ppd since age 10, quit ~2003), with a history of ETOH abuse, bronchitis. She is s/p TAVR with subsequent valve infection requiring Open replacing 11/2020 at [place], currently on ASA 81mg, who c/o Hemoptysis on Nov 23, 2023 - Dec 5th, 2023.  CT chest showed lung mass. Referred by Dr. Rey Mondragon for evaluation. Pt with 3cm RUL lesion with enlarged mediastinal lymph node worrisome for lung cancer. I have explained this to the patient and her daughter. I have asked the patient to obtain a PET/CT scan and pulmonary function test and to follow up with me in my office after the test. I have answered all of her questions, and she understands the importance of follow up.    I, Dr. Molina Rosales, personally performed the evaluation and management (E/M) services for this new patient. That E/M includes conducting the initial examination, assessing all conditions, and establishing the plan of care. Today, My ACP, Stephanie Zaldivar, was here to observe my evaluation and management services for this patient to be followed going forward.

## 2023-12-13 NOTE — CONSULT LETTER
[FreeTextEntry2] : Dr. Rey Mondragon (Pulm/Ref) [FreeTextEntry3] : Molina Rosales MD  Attending Surgeon  Division of Thoracic Surgery  , Cardiovascular and Thoracic Surgery  [facility]

## 2023-12-13 NOTE — HISTORY OF PRESENT ILLNESS
[FreeTextEntry1] : Ms. RAF VALERIO, 73 year old female, former smoker (2 ppd since age 10, quit ~2003), hx of ETOH abuse, w/ hx of bronchitis, s/p TAVR, then c/b aortic valve infection s/p Open heart removal 11/2020 at [place], currently on ASA 81mg, who c/o Hemoptysis on Nov 23, 2023 - Dec 5th, 2023, CT chest showed lung mass. Rreferred by Dr. Rey Mondragon (Pul) for evaluation.   CT chest on 11/27/2023: (PH) - -3.3 x 3.4 x 3.2 cm irregular right upper lobe lung mass medially abutting the mediastinum and SVC. With limited delineation with the mediastinum without intravenous contrast. Occluded right apical bronchus. Nonspecific groundglass opacity in the right upper lobe may be related to postobstructive pneumonia. -Mild right posterior pleural thickening with small cystic changes likely representing blebs in the right apex posteriorly on image 3:22 -5 x 2 mm nodule in the right middle lobe on image 3:81.  - 6 x 5 mm nodule in the right costophrenic angle on image 112.  - Calcified granuloma measuring 4 mm in the left upper lobe on image 50. - Status post aortic valve replacement with the ascending aorta measuring 4.3 cm. - Indistinct right paratracheal lymph node measuring at least 12 x 16 mm contiguous with the right upper lobe lung mass. - Small hiatal hernia. Status post gastric bypass surgery.  - Adrenal glands are poorly visualized on the current study. No gross mass is identified.   Patient is here today for CT surgery consultation. Currently no hemoptysis, admits to dry cough, and SOB on exertion since heart surgery.

## 2023-12-15 PROBLEM — R91.8 LUNG MASS: Status: ACTIVE | Noted: 2023-12-08

## 2023-12-17 ENCOUNTER — APPOINTMENT (OUTPATIENT)
Dept: NUCLEAR MEDICINE | Facility: CLINIC | Age: 73
End: 2023-12-17
Payer: MEDICARE

## 2023-12-17 ENCOUNTER — OUTPATIENT (OUTPATIENT)
Dept: OUTPATIENT SERVICES | Facility: HOSPITAL | Age: 73
LOS: 1 days | End: 2023-12-17
Payer: MEDICARE

## 2023-12-17 DIAGNOSIS — R91.8 OTHER NONSPECIFIC ABNORMAL FINDING OF LUNG FIELD: ICD-10-CM

## 2023-12-17 PROCEDURE — 78815 PET IMAGE W/CT SKULL-THIGH: CPT | Mod: 26,PI,MH

## 2023-12-17 PROCEDURE — A9552: CPT

## 2023-12-17 PROCEDURE — 78815 PET IMAGE W/CT SKULL-THIGH: CPT

## 2023-12-20 ENCOUNTER — APPOINTMENT (OUTPATIENT)
Dept: THORACIC SURGERY | Facility: CLINIC | Age: 73
End: 2023-12-20
Payer: MEDICARE

## 2023-12-20 ENCOUNTER — APPOINTMENT (OUTPATIENT)
Dept: PULMONOLOGY | Facility: CLINIC | Age: 73
End: 2023-12-20
Payer: MEDICARE

## 2023-12-20 VITALS
DIASTOLIC BLOOD PRESSURE: 64 MMHG | WEIGHT: 152 LBS | SYSTOLIC BLOOD PRESSURE: 104 MMHG | HEIGHT: 68 IN | BODY MASS INDEX: 23.04 KG/M2 | OXYGEN SATURATION: 99 % | HEART RATE: 85 BPM

## 2023-12-20 DIAGNOSIS — R91.8 OTHER NONSPECIFIC ABNORMAL FINDING OF LUNG FIELD: ICD-10-CM

## 2023-12-20 PROCEDURE — 94729 DIFFUSING CAPACITY: CPT

## 2023-12-20 PROCEDURE — 99215 OFFICE O/P EST HI 40 MIN: CPT

## 2023-12-20 PROCEDURE — 94060 EVALUATION OF WHEEZING: CPT

## 2023-12-20 PROCEDURE — 94727 GAS DIL/WSHOT DETER LNG VOL: CPT

## 2023-12-20 NOTE — ASSESSMENT
[FreeTextEntry1] : Ms. RAF VALERIO, 73 year old female, former smoker (2 ppd since age 10, quit ~2003), history of ETOH abuse, w/ history of bronchitis, s/p TAVR, then c/b aortic valve infection s/p Open heart removal 11/2020 at [place], currently on ASA 81mg, who c/o Hemoptysis on Nov 23, 2023 - Dec 5th, 2023, CT chest showed lung mass. Pt now presents with PET/CT which shows hypermetabolic RUL lesion and a positive paratracheal lymph node. I have explained to the patient that this likely represents stage III lung cancer. I will plan for a EBUS possible mediastinoscopy for tissue biopsy and staging. I have explained the risks benefits and alternatives of the procedure to the patient including bleeding and infection. I will plan for the procedure in the near future once she is cleared. I have answered all of her questions, and she understands the importance of follow up.    I, FADIA Frey, personally performed the evaluation and management (E/M) services for this established patient who presents today with (a) new problem(s)/exacerbation of (an) existing condition(s). That E/M includes conducting the examination, assessing all new/exacerbated conditions, and establishing a new plan of care. Today, my ACP, Stephanie Zaldivar NP was here to observe my evaluation and management services for this new problem/exacerbated condition to be followed going forward.

## 2023-12-20 NOTE — HISTORY OF PRESENT ILLNESS
[FreeTextEntry1] : Ms. RAF VALERIO, 73 year old female, former smoker (2 ppd since age 10, quit ~2003), hx of ETOH abuse, w/ hx of bronchitis, s/p TAVR, then c/b aortic valve infection s/p Open heart removal 11/2020 at [place], currently on ASA 81mg, who c/o Hemoptysis on Nov 23, 2023 - Dec 5th, 2023, CT chest showed lung mass. Rreferred by Dr. Rey Mondragon (Pul) for evaluation.   CT chest on 11/27/2023: (PH) - -3.3 x 3.4 x 3.2 cm irregular right upper lobe lung mass medially abutting the mediastinum and SVC. With limited delineation with the mediastinum without intravenous contrast. Occluded right apical bronchus. Nonspecific groundglass opacity in the right upper lobe may be related to postobstructive pneumonia. -Mild right posterior pleural thickening with small cystic changes likely representing blebs in the right apex posteriorly on image 3:22 -5 x 2 mm nodule in the right middle lobe on image 3:81.  - 6 x 5 mm nodule in the right costophrenic angle on image 112.  - Calcified granuloma measuring 4 mm in the left upper lobe on image 50. - Status post aortic valve replacement with the ascending aorta measuring 4.3 cm. - Indistinct right paratracheal lymph node measuring at least 12 x 16 mm contiguous with the right upper lobe lung mass. - Small hiatal hernia. Status post gastric bypass surgery.  - Adrenal glands are poorly visualized on the current study. No gross mass is identified.   PET/CT on 12/17/2023:  - Ill-defined focus in the region of the RIGHT lobe of the thyroid (SUV 3.6, image 58), without clear low-dose CT correlate. -  Hypermetabolic right upper lobe solid, partially calcified mass abutting the mediastinum and SVC (3.6 x 3.3 cm, SUV 28.5, image 83), previously 3.1 x 2.9 cm on 11/27/2023.  - Hypermetabolic right paratracheal lymph node adjacent to the mass (2.8 x 2.7 cm, SUV 18.2, image 88), previously 2.7 x 2.2 cm.  - Occluded right upper apical bronchus.  - Right upper lobe groundglass opacity appears decreased, although was seen to better advantage on prior dedicated CT.  - Stable right middle lobe 5 mm (image 113) and right lower lobe/costophrenic angle 6 mm (image 128) nodules that are below PET resolution. Left upper lobe calcified granuloma. -Physiologic FDG activity. Suspected hepatomegaly (17.9 cm (coronal view)  - Uptake at the GE junction/distal esophagus likely related to small hiatal hernia. Patient has had gastric surgery which is otherwise unremarkable metabolically.  PFTs on 12/20/23: FVC 3.27 L (88%), FEV1 2.42 L (76%), DLCO 79%.  Patient is here today for a follow up.

## 2023-12-20 NOTE — CONSULT LETTER
[Dear  ___] : Dear  [unfilled], [Consult Letter:] : I had the pleasure of evaluating your patient, [unfilled]. [Please see my note below.] : Please see my note below. [Consult Closing:] : Thank you very much for allowing me to participate in the care of this patient.  If you have any questions, please do not hesitate to contact me. [Sincerely,] : Sincerely, [FreeTextEntry2] : Dr. Rey Mondragon (Pulm/Ref) [FreeTextEntry3] : Molina Rosales MD  Attending Surgeon  Division of Thoracic Surgery  , Cardiovascular and Thoracic Surgery  [facility] at [facility]

## 2023-12-27 ENCOUNTER — OUTPATIENT (OUTPATIENT)
Dept: OUTPATIENT SERVICES | Facility: HOSPITAL | Age: 73
LOS: 1 days | End: 2023-12-27
Payer: MEDICARE

## 2023-12-27 VITALS
HEIGHT: 68 IN | DIASTOLIC BLOOD PRESSURE: 68 MMHG | HEART RATE: 72 BPM | SYSTOLIC BLOOD PRESSURE: 110 MMHG | RESPIRATION RATE: 18 BRPM | OXYGEN SATURATION: 98 % | TEMPERATURE: 98 F | WEIGHT: 149.91 LBS

## 2023-12-27 DIAGNOSIS — Z98.49 CATARACT EXTRACTION STATUS, UNSPECIFIED EYE: Chronic | ICD-10-CM

## 2023-12-27 DIAGNOSIS — R91.8 OTHER NONSPECIFIC ABNORMAL FINDING OF LUNG FIELD: ICD-10-CM

## 2023-12-27 DIAGNOSIS — Z96.652 PRESENCE OF LEFT ARTIFICIAL KNEE JOINT: Chronic | ICD-10-CM

## 2023-12-27 DIAGNOSIS — Z95.2 PRESENCE OF PROSTHETIC HEART VALVE: Chronic | ICD-10-CM

## 2023-12-27 DIAGNOSIS — E03.9 HYPOTHYROIDISM, UNSPECIFIED: ICD-10-CM

## 2023-12-27 LAB
BLD GP AB SCN SERPL QL: NEGATIVE — SIGNIFICANT CHANGE UP
BLD GP AB SCN SERPL QL: NEGATIVE — SIGNIFICANT CHANGE UP
RH IG SCN BLD-IMP: POSITIVE — SIGNIFICANT CHANGE UP

## 2023-12-27 PROCEDURE — 93010 ELECTROCARDIOGRAM REPORT: CPT

## 2023-12-27 RX ORDER — SODIUM CHLORIDE 9 MG/ML
1000 INJECTION, SOLUTION INTRAVENOUS
Refills: 0 | Status: DISCONTINUED | OUTPATIENT
Start: 2024-01-02 | End: 2024-01-16

## 2023-12-27 RX ORDER — LEVOTHYROXINE SODIUM 125 MCG
1 TABLET ORAL
Qty: 0 | Refills: 0 | DISCHARGE

## 2023-12-27 NOTE — H&P PST ADULT - PROBLEM SELECTOR PLAN 1
Schedule for bronchoscopy, Endobronchial Ultrasound guided biopsy with Cytology tentatively on 01/02/24. Pre op instructions, famotidine given and explained. Pt verbalized understanding.

## 2023-12-27 NOTE — H&P PST ADULT - HISTORY OF PRESENT ILLNESS
74 y/o F with PMHx of severe aortic stenosis s/p TAVR on Oct , 2019 (only on aspirin),  gastric bypass(on 2004), hypothyroidism     cough blood on Thanksgivings night, eval by PCP who ordered at CT scan then referred to pulm and surgeon       74 y/o F with PMHx of severe aortic stenosis s/p TAVR on Oct , 2019 / AVR in 2020, Hypothyroidism , HTN presents to PST for pre op evaluation stated that she "cough up blood on Thanksgivings night and couple of days after. Pt was eval by PCP who ordered at CT scan then referred to pulm and surgeon. Schedule for bronchoscopy, Endobronchial ultrasound guided biopsy with cytology, possible mediastinoscopy tentatively on 01/02/24       72 y/o F with PMHx of severe aortic stenosis s/p TAVR on Oct , 2019 / AVR in 2020, Hypothyroidism , HTN presents to PST for pre op evaluation stated that she "cough up blood on Thanksgivings night and couple of days after. Pt was eval by PCP who ordered at CT scan then referred to pulm and surgeon. Schedule for bronchoscopy, Endobronchial ultrasound guided biopsy with cytology, possible mediastinoscopy tentatively on 01/02/24

## 2023-12-27 NOTE — H&P PST ADULT - ATTENDING COMMENTS
Patient seen and examined agree with above note as modified, where appropriate, by me. The risks, benefits and alternatives of the procedure were explained to the patient including but not limited to the risks of bleeding, infection, shortness of breath, oxygen dependance, benign disease, airway damage, nerve damage, lymphatic leak, need for sternotomy or thoracotomy and pneumothorax. All of the patient's questions were answered, she demonstrated understanding and freely consented to the procedure.

## 2023-12-27 NOTE — H&P PST ADULT - NSICDXPASTMEDICALHX_GEN_ALL_CORE_FT
PAST MEDICAL HISTORY:  Aortic stenosis     Eczema     Hypothyroidism     Osteoarthritis left knee arthroscopy 1990     PAST MEDICAL HISTORY:  Aortic stenosis     Eczema     HTN (hypertension)     Hypothyroidism     Osteoarthritis left knee arthroscopy 1990

## 2023-12-27 NOTE — H&P PST ADULT - NEGATIVE CARDIOVASCULAR SYMPTOMS
no chest pain/no palpitations/no dyspnea on exertion/no paroxysmal nocturnal dyspnea/no claudication

## 2023-12-27 NOTE — H&P PST ADULT - NSICDXPASTSURGICALHX_GEN_ALL_CORE_FT
PAST SURGICAL HISTORY:  Abdominal cyst removed 2008      Section  d/t failure to progress and Repeat     Gastric Bypass  for Obesity 2004     History of Appendectomy     Parotid Mass Removed      S/P left Bunionectomy      Vocal Cord Polyp Removed  Denies current vocal cord polyps     PAST SURGICAL HISTORY:  Abdominal cyst removed 2008      Section  d/t failure to progress and Repeat     Gastric Bypass  for Obesity      H/O total knee replacement, left     History of Appendectomy     History of cataract surgery     History of transcatheter aortic valve replacement (TAVR)     Parotid Mass Removed      S/P aortic valve replacement     S/P left Bunionectomy      Vocal Cord Polyp Removed  Denies current vocal cord polyps

## 2023-12-29 NOTE — ASU PATIENT PROFILE, ADULT - CENTRAL VENOUS CATHETER
Left message for patient that Echo is 1/6/20 at 11:00.   Electronically signed by Yessenia Lobo on 1/2/2020 at 1:32 PM no

## 2023-12-29 NOTE — ASU PATIENT PROFILE, ADULT - NSICDXPASTMEDICALHX_GEN_ALL_CORE_FT
PAST MEDICAL HISTORY:  Aortic stenosis     Eczema     HTN (hypertension)     Hypothyroidism     Osteoarthritis left knee arthroscopy 1990

## 2023-12-29 NOTE — ASU PATIENT PROFILE, ADULT - NSICDXPASTSURGICALHX_GEN_ALL_CORE_FT
PAST SURGICAL HISTORY:  Abdominal cyst removed 2008      Section  d/t failure to progress and Repeat     Gastric Bypass  for Obesity      H/O total knee replacement, left     History of Appendectomy     History of cataract surgery     History of transcatheter aortic valve replacement (TAVR)     Parotid Mass Removed      S/P aortic valve replacement     S/P left Bunionectomy      Vocal Cord Polyp Removed  Denies current vocal cord polyps

## 2023-12-29 NOTE — ASU PATIENT PROFILE, ADULT - FALL HARM RISK - UNIVERSAL INTERVENTIONS
Bed in lowest position, wheels locked, appropriate side rails in place/Call bell, personal items and telephone in reach/Instruct patient to call for assistance before getting out of bed or chair/Non-slip footwear when patient is out of bed/[place] to call system/Physically safe environment - no spills, clutter or unnecessary equipment/Purposeful Proactive Rounding/Room/bathroom lighting operational, light cord in reach Bed in lowest position, wheels locked, appropriate side rails in place/Call bell, personal items and telephone in reach/Instruct patient to call for assistance before getting out of bed or chair/Non-slip footwear when patient is out of bed/[place] to call system/Physically safe environment - no spills, clutter or unnecessary equipment/Purposeful Proactive Rounding/Room/bathroom lighting operational, light cord in reach

## 2024-01-01 ENCOUNTER — TRANSCRIPTION ENCOUNTER (OUTPATIENT)
Age: 74
End: 2024-01-01

## 2024-01-02 ENCOUNTER — TRANSCRIPTION ENCOUNTER (OUTPATIENT)
Age: 74
End: 2024-01-02

## 2024-01-02 ENCOUNTER — OUTPATIENT (OUTPATIENT)
Dept: OUTPATIENT SERVICES | Facility: HOSPITAL | Age: 74
LOS: 1 days | Discharge: HOME | End: 2024-01-02
Payer: MEDICARE

## 2024-01-02 ENCOUNTER — RESULT REVIEW (OUTPATIENT)
Age: 74
End: 2024-01-02

## 2024-01-02 ENCOUNTER — APPOINTMENT (OUTPATIENT)
Dept: THORACIC SURGERY | Facility: HOSPITAL | Age: 74
End: 2024-01-02

## 2024-01-02 VITALS
RESPIRATION RATE: 17 BRPM | SYSTOLIC BLOOD PRESSURE: 95 MMHG | TEMPERATURE: 98 F | DIASTOLIC BLOOD PRESSURE: 78 MMHG | HEART RATE: 76 BPM | OXYGEN SATURATION: 98 %

## 2024-01-02 VITALS
WEIGHT: 149.91 LBS | HEART RATE: 80 BPM | OXYGEN SATURATION: 100 % | HEIGHT: 68 IN | RESPIRATION RATE: 16 BRPM | DIASTOLIC BLOOD PRESSURE: 67 MMHG | SYSTOLIC BLOOD PRESSURE: 98 MMHG | TEMPERATURE: 97 F

## 2024-01-02 DIAGNOSIS — Z96.652 PRESENCE OF LEFT ARTIFICIAL KNEE JOINT: Chronic | ICD-10-CM

## 2024-01-02 DIAGNOSIS — Z95.2 PRESENCE OF PROSTHETIC HEART VALVE: Chronic | ICD-10-CM

## 2024-01-02 DIAGNOSIS — Z98.49 CATARACT EXTRACTION STATUS, UNSPECIFIED EYE: Chronic | ICD-10-CM

## 2024-01-02 DIAGNOSIS — R91.8 OTHER NONSPECIFIC ABNORMAL FINDING OF LUNG FIELD: ICD-10-CM

## 2024-01-02 LAB
GRAM STN FLD: SIGNIFICANT CHANGE UP
GRAM STN FLD: SIGNIFICANT CHANGE UP
NIGHT BLUE STAIN TISS: SIGNIFICANT CHANGE UP
NIGHT BLUE STAIN TISS: SIGNIFICANT CHANGE UP
SPECIMEN SOURCE: SIGNIFICANT CHANGE UP

## 2024-01-02 PROCEDURE — 71045 X-RAY EXAM CHEST 1 VIEW: CPT | Mod: 26

## 2024-01-02 PROCEDURE — 88305 TISSUE EXAM BY PATHOLOGIST: CPT | Mod: 26

## 2024-01-02 PROCEDURE — 88341 IMHCHEM/IMCYTCHM EA ADD ANTB: CPT | Mod: 26,59

## 2024-01-02 PROCEDURE — 88360 TUMOR IMMUNOHISTOCHEM/MANUAL: CPT | Mod: 26

## 2024-01-02 PROCEDURE — 31624 DX BRONCHOSCOPE/LAVAGE: CPT

## 2024-01-02 PROCEDURE — 88342 IMHCHEM/IMCYTCHM 1ST ANTB: CPT | Mod: 26,59

## 2024-01-02 PROCEDURE — 31652 BRONCH EBUS SAMPLNG 1/2 NODE: CPT

## 2024-01-02 PROCEDURE — 88172 CYTP DX EVAL FNA 1ST EA SITE: CPT | Mod: 26

## 2024-01-02 PROCEDURE — 88173 CYTOPATH EVAL FNA REPORT: CPT | Mod: 26,59

## 2024-01-02 RX ORDER — OXYCODONE HYDROCHLORIDE 5 MG/1
5 TABLET ORAL ONCE
Refills: 0 | Status: DISCONTINUED | OUTPATIENT
Start: 2024-01-02 | End: 2024-01-02

## 2024-01-02 RX ORDER — SPIRONOLACTONE 25 MG/1
0.5 TABLET, FILM COATED ORAL
Refills: 0 | DISCHARGE

## 2024-01-02 RX ORDER — FINASTERIDE 5 MG/1
0.5 TABLET, FILM COATED ORAL
Refills: 0 | DISCHARGE

## 2024-01-02 RX ORDER — METOPROLOL TARTRATE 50 MG
1 TABLET ORAL
Refills: 0 | DISCHARGE

## 2024-01-02 RX ORDER — FENTANYL CITRATE 50 UG/ML
25 INJECTION INTRAVENOUS
Refills: 0 | Status: DISCONTINUED | OUTPATIENT
Start: 2024-01-02 | End: 2024-01-02

## 2024-01-02 RX ORDER — ONDANSETRON 8 MG/1
4 TABLET, FILM COATED ORAL ONCE
Refills: 0 | Status: DISCONTINUED | OUTPATIENT
Start: 2024-01-02 | End: 2024-01-16

## 2024-01-02 RX ORDER — LOSARTAN POTASSIUM 100 MG/1
0.5 TABLET, FILM COATED ORAL
Refills: 0 | DISCHARGE

## 2024-01-02 RX ORDER — FENTANYL CITRATE 50 UG/ML
50 INJECTION INTRAVENOUS
Refills: 0 | Status: DISCONTINUED | OUTPATIENT
Start: 2024-01-02 | End: 2024-01-02

## 2024-01-02 RX ORDER — ASPIRIN/CALCIUM CARB/MAGNESIUM 324 MG
1 TABLET ORAL
Refills: 0 | DISCHARGE

## 2024-01-02 RX ORDER — LEVOTHYROXINE SODIUM 125 MCG
1 TABLET ORAL
Refills: 0 | DISCHARGE

## 2024-01-02 RX ORDER — BUMETANIDE 0.25 MG/ML
1 INJECTION INTRAMUSCULAR; INTRAVENOUS
Refills: 0 | DISCHARGE

## 2024-01-02 RX ORDER — ACETAMINOPHEN 500 MG
2 TABLET ORAL
Qty: 0 | Refills: 0 | DISCHARGE

## 2024-01-02 NOTE — ASU DISCHARGE PLAN (ADULT/PEDIATRIC) - NS MD DC FALL RISK RISK
For information on Fall & Injury Prevention, visit: [email or web address] OR  [email or web address] OR  https://www.cdc.gov/steadi/patient.html For information on Fall & Injury Prevention, visit: [email or web address] OR  [email or web address] OR  https://www.cdc.gov/steadi/patient.html

## 2024-01-02 NOTE — ASU DISCHARGE PLAN (ADULT/PEDIATRIC) - ASU DC SPECIAL INSTRUCTIONSFT
Dr. Rosales's office will give you a call this week to discuss follow up. You may call with any questions or concerns [phone]. Dr. Rosales's office will give you a call this week to discuss follow up. You may call with any questions or concerns [phone].

## 2024-01-02 NOTE — BRIEF OPERATIVE NOTE - OPERATION/FINDINGS
Flexible Bronchoscopy, BAL of Right Main, EBUS, 5cc saline w/ epi for hemostasis to bronch and suctioned out.

## 2024-01-02 NOTE — BRIEF OPERATIVE NOTE - COMMENTS
HALLIE Batres, provided direct first assist support to the surgeon during this surgical procedure. My involvement included positioning, prepping and draping the patient prior to surgery, ensuring clear visibility and exposure for the surgeon by using instruments such as retractors and suction, closing surgical incisions and dressing wounds. As well as other tasks as directed by the surgeon.

## 2024-01-02 NOTE — BRIEF OPERATIVE NOTE - NSICDXBRIEFPROCEDURE_GEN_ALL_CORE_FT
PROCEDURES:  Bronchoscopy, with EBUS and bronchoalveolar lavage 02-Jan-2024 16:35:27  Bob Mccarthy

## 2024-01-02 NOTE — ASU DISCHARGE PLAN (ADULT/PEDIATRIC) - NURSING INSTRUCTIONS
DO NOT take any Tylenol (Acetaminophen) or narcotics containing Tylenol until after 9:15pm. You received Tylenol during your operation and it can cause damage to your liver if too much is taken within a 24 hour time period.

## 2024-01-02 NOTE — ASU DISCHARGE PLAN (ADULT/PEDIATRIC) - CARE PROVIDER_API CALL
Molina Rosales  Thoracic Surgery  [address] [place] [address] [place] [address]  Phone: [phone]  Fax: [phone]  Follow Up Time:     Sandor Rosa  Pulmonary Disease  [address] [place] [id number]  Phone: [phone]  Fax: [phone]  Follow Up Time:    Molina Rosales  Thoracic Surgery  [address] [place] [address] [place] [address]  Phone: [phone]  Fax: [phone]  Follow Up Time:     Sandor Rosa  Pulmonary Disease  [address] [place] [address]  Phone: [phone]  Fax: [phone]  Follow Up Time:

## 2024-01-02 NOTE — ASU PREOP CHECKLIST - PATIENT SENT TO
Patient arrive to unit with belongings.  Ambulated to bed, oriented to room.  Bed locked in lowest position, call light within reach, tele notified.   operating room

## 2024-01-02 NOTE — ASU DISCHARGE PLAN (ADULT/PEDIATRIC) - PROVIDER TOKENS
PROVIDER:[TOKEN:[[id number]]],PROVIDER:[TOKEN:[[id number]]] PROVIDER:[TOKEN:[[id number]]],PROVIDER:[TOKEN:[[id number]]]

## 2024-01-04 LAB
CULTURE RESULTS: SIGNIFICANT CHANGE UP
CULTURE RESULTS: SIGNIFICANT CHANGE UP
SPECIMEN SOURCE: SIGNIFICANT CHANGE UP
SPECIMEN SOURCE: SIGNIFICANT CHANGE UP

## 2024-01-08 LAB
NON-GYNECOLOGICAL CYTOLOGY STUDY: SIGNIFICANT CHANGE UP
NON-GYNECOLOGICAL CYTOLOGY STUDY: SIGNIFICANT CHANGE UP

## 2024-01-10 ENCOUNTER — NON-APPOINTMENT (OUTPATIENT)
Age: 74
End: 2024-01-10

## 2024-01-10 PROBLEM — I10 ESSENTIAL (PRIMARY) HYPERTENSION: Chronic | Status: ACTIVE | Noted: 2023-12-27

## 2024-01-24 ENCOUNTER — APPOINTMENT (OUTPATIENT)
Dept: THORACIC SURGERY | Facility: CLINIC | Age: 74
End: 2024-01-24

## 2024-01-31 LAB
CULTURE RESULTS: SIGNIFICANT CHANGE UP
SPECIMEN SOURCE: SIGNIFICANT CHANGE UP

## 2024-02-17 LAB
CULTURE RESULTS: SIGNIFICANT CHANGE UP
SPECIMEN SOURCE: SIGNIFICANT CHANGE UP

## 2024-03-26 ENCOUNTER — RX ONLY (RX ONLY)
Age: 74
End: 2024-03-26

## 2024-03-26 ENCOUNTER — OFFICE (OUTPATIENT)
Dept: URBAN - METROPOLITAN AREA CLINIC 102 | Facility: CLINIC | Age: 74
Setting detail: OPHTHALMOLOGY
End: 2024-03-26
Payer: MEDICARE

## 2024-03-26 DIAGNOSIS — H10.45: ICD-10-CM

## 2024-03-26 DIAGNOSIS — H02.34: ICD-10-CM

## 2024-03-26 DIAGNOSIS — H02.834: ICD-10-CM

## 2024-03-26 DIAGNOSIS — H02.31: ICD-10-CM

## 2024-03-26 DIAGNOSIS — Z96.1: ICD-10-CM

## 2024-03-26 DIAGNOSIS — H40.011: ICD-10-CM

## 2024-03-26 DIAGNOSIS — H43.812: ICD-10-CM

## 2024-03-26 DIAGNOSIS — H02.831: ICD-10-CM

## 2024-03-26 DIAGNOSIS — H43.391: ICD-10-CM

## 2024-03-26 PROCEDURE — 92014 COMPRE OPH EXAM EST PT 1/>: CPT | Performed by: OPHTHALMOLOGY

## 2024-03-26 ASSESSMENT — LID EXAM ASSESSMENTS
OD_LAXITY: 1+
OD_FRONTALIS_USE: 1+
OS_BLEPHARITIS: LLL LUL 1+
OS_LAXITY: 1+
OD_INF_SCLERAL_SHOW: 1+
OD_COMMENTS: 1+ INF SCLERAL SHO
OS_COMMENTS: 1+ INF SCLERAL SHO
OD_BLEPHARITIS: RLL RUL 1+

## 2024-03-26 ASSESSMENT — LID POSITION - COMMENTS
OD_COMMENTS: INCISION INTACT
OS_COMMENTS: INCISION INTACT

## 2024-04-25 NOTE — ED PROVIDER NOTE - PRINCIPAL DIAGNOSIS
[facility] [place]   [facility]     Patient:  Mikayla Chan Date:  2024     :  1958 Attending:  Cindy Springer MD   65 year old female      Chief Complaint on Admission: SOB   Reason for Consult / Transfer of Care: Pulmonary Hypertension     History of Present Illness (as previously documented):  Mikayla Chan is a 65 year old female with a past medical history significant for PAH on Veletri and tadalafil, VHD s/p MV replacement x2 (10/2002 & 12); ASD s/p patch repair , severe RV dysfunction and chronic hypoxic respiratory failure on 5L O2 nightly. Presented to ED with shortness of breath and chest pain. Recently admitted -24 for volume overload in the setting of acute on chronic RV dysfunction and severe idiopathic pulmonary arterial hypertension.       Interval Subjective History / Events  See notes from  for prior summaries  . UOP appears to have slowed from yesterday with lasix gtt. Feels breathing is OK. Cr 0.66>0.77. HDS Finally off O2 high flow. Plan to restart opsimut today and restart home oral diuretic tomorrow.  : Tired today, feels she overdid it yesterday with therapy / walks. Still feels volume expanded, diuresing on Lasix infusion with stable renal fx.   : Afebrile, weaned off O2. Feels tired today and feels legs are more swollen. Trying to eat more protein. Was walking in [place]  :  Had visual disturbance  - describes as not being able to see out of left eye and having an orange teardrop shape area in the field of vision. Seen by neurosurgery. No further episodes. On O2 overnight (baseline) NET - 1.3L on lasix infusion,  : continued to have good response to IV diuretics. Patient occasionally feels dizzy and lightheaded. Overall, breathing and LE swelling has markedly improved.     Review of Systems: Pertinent positives above. A comprehensive 10+ point ROS discussed.       Medications/Infusions: Reviewed    Rhythm: NSR  Arrhythmias: Occ PVC    Vitals Last Value 24-Hour Range   Temperature 98.4 °F (36.9 °C) Temp  Min: 98.2 °F (36.8 °C)  Max: 99 °F (37.2 °C)   Pulse 83 Pulse  Min: 83  Max: 98   Respiratory 16 Resp  Min: 16  Max: 18   Blood Pressure 98/55 BP  Min: 96/59  Max: 106/64   Pulse Oximetry 95 % SpO2  Min: 81 %  Max: 95 %     Vitals Today Admitted   Weight 46 kg (101 lb 6.4 oz) Weight: 50.8 kg (112 lb)   Height N/A Height: 5' (152.4 cm)   BMI (body mass index) N/A BMI (Calculated): 21.27     Weight over the past 48 hours:  Patient Vitals for the past 48 hrs:   Weight   04/24/24 0605 46.5 kg (102 lb 9.6 oz)   04/25/24 0455 46 kg (101 lb 6.4 oz)      Intake/Output:  No intake/output data recorded.  I/O last 3 completed shifts:  In: 1620 [P.O.:1620]  Out: 2100 [Urine:2100]    Intake/Output Summary (Last 24 hours) at 4/25/2024 0813  Last data filed at 4/25/2024 0455  Gross per 24 hour   Intake 1620 ml   Output 1900 ml   Net -280 ml     Physical Assessment:  General:    Up in chair eating   Incision:    [place] site CDI   EENT:    Pupils equal, sclera without redness or icterus. EOMs intact   Oral Mucosa:    Moist   Neck:   Trachea midline,  no masses or JVD   Lungs:      Clear anterior fields, diminished at bases, no wheezing or cough   Cardiovascular:   S1/S2 +murmur, no lower leg edema   Abdomen:     Soft, non tender, +BS   Pulses:   1+ radial bilaterally   Skin:   Warm core / periphery, dry   Neurologic:   Awake, alert, oriented x3, no motor deficit noted, Hand grasps equal     Laboratory Results:  Lab Results   Component Value Date    WBC 6.8 04/25/2024    HGB 8.9 (L) 04/25/2024    HCT 28.5 (L) 04/25/2024     04/25/2024    BUN 16 04/25/2024    CREATININE 0.81 04/25/2024    SODIUM 140 04/25/2024    POTASSIUM 3.5 04/25/2024    CO2 25 04/25/2024    MG 2.5 (H) 04/22/2024    BILIRUBIN 0.5 04/25/2024    INR 1.5 04/25/2024    PTT 61 (H) 04/02/2024    ALKPT 103 04/25/2024     AST 11 04/25/2024    GPT 17 04/25/2024    ALBUMIN 3.2 (L) 04/25/2024    TAMIA 1.07 (L) 04/16/2024    GLUCOSE 91 04/25/2024    TSH 0.344 (L) 04/07/2024     CT head 4/24: reviewed    CT Head and Neck 4/23:  CTA NECK:    1.  No hemodynamically significant extracranial stenosis, occlusion or  dissection.  2.  Partially visualized right pleural effusion.   CTA HEAD:    1.  No hemodynamically significant intracranial vascular stenosis, large  vessel occlusion or aneurysm.   Contrast-enhanced CT head:  1.  Left anterior parafalcine calcified extra-axial lesion measuring 1.1 cm  likely meningioma. Consider follow-up contrast-enhanced MRI brain. No mass  effect.  2.  Small bilateral extra-axial fluid collections as described on the  concurrent CT brain.    ECHO 4/12/2024    * Focused TTE: S/p Wil 3 valve-in-valve - TMVR (04/11/24).    * Well seated TMVR valve in valve. Mitral valve mean gradient 3 mmHg at  heart rate of 65 bpm.    * Trivial transvalvular regurgitation.  No paravalvular regurgitation.    * No dynamic LVOT obstruction.    * Normal left ventricular chamber size with hyperdynamic left ventricular  systolic function with ejection fraction of 74 %.    * Moderately enlarged right ventricular chamber size and moderately reduced  right ventricular systolic function.    * Moderate tricuspid valve regurgitation.    * Flattening of the septum in diastole consistent with right ventricular  volume overload.    * No pericardial effusion.    * Compared to 4/11/2023 study, no change .    RHC 4/10/2024 (50.9 kg)  RA= 11 mmHg.  RV= 76/17 mmHg.  PAP= 76/36 (52) mmHg.         PA sat= 52.7%  PCWP= 21 mmHg.  TPG= 31 mmHg  PVR 9.7 Murillo  SVR 1625 dynes/cm.sec.5   Eben Cardiac output (L/min)/cardiac index (L/min/m2)= 3.2/2.2.   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 (LVEF 49-->72%); Severe RV Dysfunction  VHD S/P MVR x2 (10/'02 & 7/'12;  Bioprosthetic)  Severe Residual Mitral Stenosis s/p TMVR 4/11/2024  Severe Idiopathic Pulmonary Arterial Hypertension; WHO Group I: Class III  ASD S/P Patch Repair 2002   Hypotension   Volume Status: Improved  Perfusion Status: Diminished  -Valve team followed; last seen 4/18/24  -Warfarin AC as Eliquis cost prohibitive; goal INR 2-2.5   -No plans for ASA with thrombocytopenia   -Statin therapy    -Volume management:   Transition from IV Lasix gtt to PO Torsemide 80mg twice daily  PTA on torsemide 60 mg daily   Increase Spironolactone to 50 mg daily  Patient cannot afford Jardiance (SGLT2I)  Check response to PO diuretics, labs, Veronica and weight tomorrow; may warrant addition of thiazide diuretic    -Continue with Midodrine 2.5 mg TID   -BP will not tolerate ACEi/ARB/ARNI    -Continue Veletri 66 ng/kg/min, no inpatient titration, dosing wt 56.7 kg   -Tadalafil on hold d/t hypotension (PTA 40 mg daily)   -Opsumit 10 mg daily    -Intolerant to Letairis (volume overload)   -Intolerant to Adempas (symptomatic hypotension)   -Daily standing weights and strict I&O   -Low sodium / fluid restricted diet     Acute on Chronic Anemia / Iron Deficiency Anemia  S/P EGD 4/4/24: Ablation / Endoclip to Non-Bleeding AVMs (gastric / duodenal)   Acute Thrombocytopenia (Concern for TTP)  S/P Plasmapheresis 4/6, 4/7, 4/8/24; Suspected TRALI 4/8/2024  -GI and hematology followed  -S/P IV Venofer x 3 doses (4/2-4/4    Visual disturbance-small sarika extra axial fluid collections           Seen by neurosurgery           Repeat Head CT this am           Warfarin held 4/23; resume per neurology and Interventional Cardiology team.     Patient may be cleared to discharge to home from [facility] team perspective by tomorrow/Saturday based on response to transition to PO diuretic regimen.     Rigoberto Rubi MD     Shortness of breath

## 2025-03-06 NOTE — ED ADULT TRIAGE NOTE - CHIEF COMPLAINT QUOTE
----- Message from SANTI Benson sent at 3/6/2025 11:15 AM CST -----  Please let patient's dad know that she is negative for strep throat and she does not need antibiotic.  Continue with symptom management.  Thank you   c/o Shortness of breath, dizziness, vomiting, diarrhea for past 2 days, pt states she may be having etoh withdrawl, last drink over 24 hour ago, pt has history of aortic valve stenosis.

## 2025-07-06 ENCOUNTER — INPATIENT (INPATIENT)
Facility: HOSPITAL | Age: 75
LOS: 3 days | Discharge: HOME HEALTH | DRG: 948 | End: 2025-07-10
Attending: FAMILY MEDICINE | Admitting: SURGERY
Payer: MEDICARE

## 2025-07-06 VITALS
RESPIRATION RATE: 18 BRPM | HEIGHT: 68 IN | DIASTOLIC BLOOD PRESSURE: 61 MMHG | TEMPERATURE: 98 F | OXYGEN SATURATION: 97 % | HEART RATE: 60 BPM | WEIGHT: 119.93 LBS | SYSTOLIC BLOOD PRESSURE: 91 MMHG

## 2025-07-06 DIAGNOSIS — Z96.652 PRESENCE OF LEFT ARTIFICIAL KNEE JOINT: Chronic | ICD-10-CM

## 2025-07-06 DIAGNOSIS — Z95.2 PRESENCE OF PROSTHETIC HEART VALVE: Chronic | ICD-10-CM

## 2025-07-06 DIAGNOSIS — R53.1 WEAKNESS: ICD-10-CM

## 2025-07-06 DIAGNOSIS — Z98.49 CATARACT EXTRACTION STATUS, UNSPECIFIED EYE: Chronic | ICD-10-CM

## 2025-07-06 LAB
ADD ON TEST-SPECIMEN IN LAB: SIGNIFICANT CHANGE UP
ALBUMIN SERPL ELPH-MCNC: 2.6 G/DL — LOW (ref 3.3–5)
ALP SERPL-CCNC: 68 U/L — SIGNIFICANT CHANGE UP (ref 40–120)
ALT FLD-CCNC: 7 U/L — LOW (ref 12–78)
ANION GAP SERPL CALC-SCNC: 6 MMOL/L — SIGNIFICANT CHANGE UP (ref 5–17)
ANISOCYTOSIS BLD QL: SLIGHT — SIGNIFICANT CHANGE UP
APPEARANCE UR: CLEAR — SIGNIFICANT CHANGE UP
APTT BLD: 27.9 SEC — SIGNIFICANT CHANGE UP (ref 26.1–36.8)
AST SERPL-CCNC: 5 U/L — LOW (ref 15–37)
B PERT IGG+IGM PNL SER: ABNORMAL
BACTERIA # UR AUTO: NEGATIVE /HPF — SIGNIFICANT CHANGE UP
BASE EXCESS BLDV CALC-SCNC: -1.5 MMOL/L — SIGNIFICANT CHANGE UP (ref -2–3)
BASOPHILS # BLD AUTO: 0.1 K/UL — SIGNIFICANT CHANGE UP (ref 0–0.2)
BASOPHILS NFR BLD AUTO: 0.7 % — SIGNIFICANT CHANGE UP (ref 0–2)
BILIRUB SERPL-MCNC: 0.5 MG/DL — SIGNIFICANT CHANGE UP (ref 0.2–1.2)
BILIRUB UR-MCNC: NEGATIVE — SIGNIFICANT CHANGE UP
BLD GP AB SCN SERPL QL: SIGNIFICANT CHANGE UP
BUN SERPL-MCNC: 22 MG/DL — SIGNIFICANT CHANGE UP (ref 7–23)
CALCIUM SERPL-MCNC: 8.2 MG/DL — LOW (ref 8.5–10.1)
CAST: 3 /LPF — SIGNIFICANT CHANGE UP (ref 0–4)
CHLORIDE SERPL-SCNC: 104 MMOL/L — SIGNIFICANT CHANGE UP (ref 96–108)
CO2 SERPL-SCNC: 23 MMOL/L — SIGNIFICANT CHANGE UP (ref 22–31)
COLOR FLD: SIGNIFICANT CHANGE UP
COLOR SPEC: YELLOW — SIGNIFICANT CHANGE UP
CREAT SERPL-MCNC: 0.92 MG/DL — SIGNIFICANT CHANGE UP (ref 0.5–1.3)
CRP SERPL-MCNC: 168 MG/L — HIGH (ref 0–5)
DIFF PNL FLD: NEGATIVE — SIGNIFICANT CHANGE UP
EGFR: 65 ML/MIN/1.73M2 — SIGNIFICANT CHANGE UP
EGFR: 65 ML/MIN/1.73M2 — SIGNIFICANT CHANGE UP
ELLIPTOCYTES BLD QL SMEAR: SLIGHT — SIGNIFICANT CHANGE UP
EOSINOPHIL # BLD AUTO: 0.11 K/UL — SIGNIFICANT CHANGE UP (ref 0–0.5)
EOSINOPHIL NFR BLD AUTO: 0.8 % — SIGNIFICANT CHANGE UP (ref 0–6)
ERYTHROCYTE [SEDIMENTATION RATE] IN BLOOD: 83 MM/HR — HIGH (ref 0–20)
FLUID INTAKE SUBSTANCE CLASS: SIGNIFICANT CHANGE UP
GLUCOSE SERPL-MCNC: 122 MG/DL — HIGH (ref 70–99)
GLUCOSE UR QL: 100 MG/DL
HCO3 BLDV-SCNC: 24 MMOL/L — SIGNIFICANT CHANGE UP (ref 22–29)
HCT VFR BLD CALC: 26.3 % — LOW (ref 34.5–45)
HGB BLD-MCNC: 7.9 G/DL — LOW (ref 11.5–15.5)
IMM GRANULOCYTES # BLD AUTO: 0.25 K/UL — HIGH (ref 0–0.07)
IMM GRANULOCYTES NFR BLD AUTO: 1.8 % — HIGH (ref 0–0.9)
INR BLD: 1.23 RATIO — HIGH (ref 0.85–1.16)
KETONES UR QL: ABNORMAL MG/DL
LACTATE SERPL-SCNC: 1.1 MMOL/L — SIGNIFICANT CHANGE UP (ref 0.7–2)
LEUKOCYTE ESTERASE UR-ACNC: ABNORMAL
LIDOCAIN IGE QN: 6 U/L — LOW (ref 13–75)
LYMPHOCYTES # BLD AUTO: 0.25 K/UL — LOW (ref 1–3.3)
LYMPHOCYTES NFR BLD AUTO: 1.8 % — LOW (ref 13–44)
MANUAL SMEAR VERIFICATION: SIGNIFICANT CHANGE UP
MCHC RBC-ENTMCNC: 26 PG — LOW (ref 27–34)
MCHC RBC-ENTMCNC: 30 G/DL — LOW (ref 32–36)
MCV RBC AUTO: 86.5 FL — SIGNIFICANT CHANGE UP (ref 80–100)
MONOCYTES # BLD AUTO: 1.22 K/UL — HIGH (ref 0–0.9)
MONOCYTES NFR BLD AUTO: 8.8 % — SIGNIFICANT CHANGE UP (ref 2–14)
MONOS+MACROS # FLD: 5 % — SIGNIFICANT CHANGE UP
NEUTROPHILS # BLD AUTO: 11.98 K/UL — HIGH (ref 1.8–7.4)
NEUTROPHILS NFR BLD AUTO: 86.1 % — HIGH (ref 43–77)
NEUTROPHILS-BODY FLUID: 95 % — SIGNIFICANT CHANGE UP
NITRITE UR-MCNC: NEGATIVE — SIGNIFICANT CHANGE UP
NRBC # BLD AUTO: 0 K/UL — SIGNIFICANT CHANGE UP (ref 0–0)
NRBC # FLD: 0 K/UL — SIGNIFICANT CHANGE UP (ref 0–0)
NRBC BLD AUTO-RTO: 0 /100 WBCS — SIGNIFICANT CHANGE UP (ref 0–0)
PCO2 BLDV: 41 MMHG — SIGNIFICANT CHANGE UP (ref 39–42)
PH BLDV: 7.37 — SIGNIFICANT CHANGE UP (ref 7.32–7.43)
PH UR: 5 — SIGNIFICANT CHANGE UP (ref 5–8)
PLAT MORPH BLD: NORMAL — SIGNIFICANT CHANGE UP
PLATELET # BLD AUTO: 282 K/UL — SIGNIFICANT CHANGE UP (ref 150–400)
PMV BLD: 9.7 FL — SIGNIFICANT CHANGE UP (ref 7–13)
PO2 BLDV: 40 MMHG — SIGNIFICANT CHANGE UP (ref 25–45)
POIKILOCYTOSIS BLD QL AUTO: SLIGHT — SIGNIFICANT CHANGE UP
POTASSIUM SERPL-MCNC: 4.5 MMOL/L — SIGNIFICANT CHANGE UP (ref 3.5–5.3)
POTASSIUM SERPL-SCNC: 4.5 MMOL/L — SIGNIFICANT CHANGE UP (ref 3.5–5.3)
PROT SERPL-MCNC: 6.6 GM/DL — SIGNIFICANT CHANGE UP (ref 6–8.3)
PROT UR-MCNC: 30 MG/DL
PROTHROM AB SERPL-ACNC: 14.5 SEC — HIGH (ref 9.9–13.4)
RBC # BLD: 3.04 M/UL — LOW (ref 3.8–5.2)
RBC # FLD: 16.2 % — HIGH (ref 10.3–14.5)
RBC BLD AUTO: SIGNIFICANT CHANGE UP
RBC CASTS # UR COMP ASSIST: 1 /HPF — SIGNIFICANT CHANGE UP (ref 0–4)
RCV VOL RI: 2875 /UL — HIGH (ref 0–0)
SAO2 % BLDV: 68 % — SIGNIFICANT CHANGE UP (ref 67–88)
SODIUM SERPL-SCNC: 133 MMOL/L — LOW (ref 135–145)
SP GR SPEC: 1.02 — SIGNIFICANT CHANGE UP (ref 1–1.03)
SQUAMOUS # UR AUTO: 1 /HPF — SIGNIFICANT CHANGE UP (ref 0–5)
SYNOVIAL CRYSTALS CLARITY: ABNORMAL
SYNOVIAL CRYSTALS COLOR: YELLOW
SYNOVIAL CRYSTALS ID: ABNORMAL
SYNOVIAL CRYSTALS TUBE: SIGNIFICANT CHANGE UP
TOTAL NUCLEATED CELL COUNT, BODY FLUID: SIGNIFICANT CHANGE UP /UL
TROPONIN I, HIGH SENSITIVITY RESULT: 6.27 NG/L — SIGNIFICANT CHANGE UP
TUBE TYPE: SIGNIFICANT CHANGE UP
URATE SERPL-MCNC: 6 MG/DL — SIGNIFICANT CHANGE UP (ref 2.5–7)
UROBILINOGEN FLD QL: 1 MG/DL — SIGNIFICANT CHANGE UP (ref 0.2–1)
WBC # BLD: 13.91 K/UL — HIGH (ref 3.8–10.5)
WBC # FLD AUTO: 13.91 K/UL — HIGH (ref 3.8–10.5)
WBC MORPHOLOGY: NORMAL — SIGNIFICANT CHANGE UP
WBC UR QL: 6 /HPF — HIGH (ref 0–5)

## 2025-07-06 PROCEDURE — 80048 BASIC METABOLIC PNL TOTAL CA: CPT

## 2025-07-06 PROCEDURE — 82728 ASSAY OF FERRITIN: CPT

## 2025-07-06 PROCEDURE — 87015 SPECIMEN INFECT AGNT CONCNTJ: CPT

## 2025-07-06 PROCEDURE — 85025 COMPLETE CBC W/AUTO DIFF WBC: CPT

## 2025-07-06 PROCEDURE — 36415 COLL VENOUS BLD VENIPUNCTURE: CPT

## 2025-07-06 PROCEDURE — 93306 TTE W/DOPPLER COMPLETE: CPT

## 2025-07-06 PROCEDURE — 97162 PT EVAL MOD COMPLEX 30 MIN: CPT | Mod: GP

## 2025-07-06 PROCEDURE — 85610 PROTHROMBIN TIME: CPT

## 2025-07-06 PROCEDURE — 83735 ASSAY OF MAGNESIUM: CPT

## 2025-07-06 PROCEDURE — 87640 STAPH A DNA AMP PROBE: CPT

## 2025-07-06 PROCEDURE — 86850 RBC ANTIBODY SCREEN: CPT

## 2025-07-06 PROCEDURE — 83540 ASSAY OF IRON: CPT

## 2025-07-06 PROCEDURE — 97116 GAIT TRAINING THERAPY: CPT | Mod: GP

## 2025-07-06 PROCEDURE — 99291 CRITICAL CARE FIRST HOUR: CPT

## 2025-07-06 PROCEDURE — 71260 CT THORAX DX C+: CPT

## 2025-07-06 PROCEDURE — 73562 X-RAY EXAM OF KNEE 3: CPT | Mod: RT

## 2025-07-06 PROCEDURE — 84145 PROCALCITONIN (PCT): CPT

## 2025-07-06 PROCEDURE — 82550 ASSAY OF CK (CPK): CPT

## 2025-07-06 PROCEDURE — 93010 ELECTROCARDIOGRAM REPORT: CPT

## 2025-07-06 PROCEDURE — 84100 ASSAY OF PHOSPHORUS: CPT

## 2025-07-06 PROCEDURE — 82306 VITAMIN D 25 HYDROXY: CPT

## 2025-07-06 PROCEDURE — 87999 UNLISTED MICROBIOLOGY PX: CPT

## 2025-07-06 PROCEDURE — 85652 RBC SED RATE AUTOMATED: CPT

## 2025-07-06 PROCEDURE — 84550 ASSAY OF BLOOD/URIC ACID: CPT

## 2025-07-06 PROCEDURE — 80076 HEPATIC FUNCTION PANEL: CPT

## 2025-07-06 PROCEDURE — 87637 SARSCOV2&INF A&B&RSV AMP PRB: CPT

## 2025-07-06 PROCEDURE — 85027 COMPLETE CBC AUTOMATED: CPT

## 2025-07-06 PROCEDURE — 87075 CULTR BACTERIA EXCEPT BLOOD: CPT

## 2025-07-06 PROCEDURE — 84443 ASSAY THYROID STIM HORMONE: CPT

## 2025-07-06 PROCEDURE — 86140 C-REACTIVE PROTEIN: CPT

## 2025-07-06 PROCEDURE — 86901 BLOOD TYPING SEROLOGIC RH(D): CPT

## 2025-07-06 PROCEDURE — 74177 CT ABD & PELVIS W/CONTRAST: CPT

## 2025-07-06 PROCEDURE — 89060 EXAM SYNOVIAL FLUID CRYSTALS: CPT

## 2025-07-06 PROCEDURE — 86900 BLOOD TYPING SEROLOGIC ABO: CPT

## 2025-07-06 PROCEDURE — 82746 ASSAY OF FOLIC ACID SERUM: CPT

## 2025-07-06 PROCEDURE — 87070 CULTURE OTHR SPECIMN AEROBIC: CPT

## 2025-07-06 PROCEDURE — 71045 X-RAY EXAM CHEST 1 VIEW: CPT | Mod: 26

## 2025-07-06 PROCEDURE — 83615 LACTATE (LD) (LDH) ENZYME: CPT

## 2025-07-06 PROCEDURE — 83550 IRON BINDING TEST: CPT

## 2025-07-06 PROCEDURE — 87641 MR-STAPH DNA AMP PROBE: CPT

## 2025-07-06 PROCEDURE — 82607 VITAMIN B-12: CPT

## 2025-07-06 PROCEDURE — 89051 BODY FLUID CELL COUNT: CPT

## 2025-07-06 PROCEDURE — 85730 THROMBOPLASTIN TIME PARTIAL: CPT

## 2025-07-06 PROCEDURE — 73562 X-RAY EXAM OF KNEE 3: CPT | Mod: 26,RT

## 2025-07-06 PROCEDURE — 83880 ASSAY OF NATRIURETIC PEPTIDE: CPT

## 2025-07-06 RX ORDER — ACETAMINOPHEN 500 MG/5ML
1000 LIQUID (ML) ORAL ONCE
Refills: 0 | Status: COMPLETED | OUTPATIENT
Start: 2025-07-06 | End: 2025-07-06

## 2025-07-06 RX ORDER — ONDANSETRON HCL/PF 4 MG/2 ML
4 VIAL (ML) INJECTION ONCE
Refills: 0 | Status: COMPLETED | OUTPATIENT
Start: 2025-07-06 | End: 2025-07-06

## 2025-07-06 RX ORDER — ENOXAPARIN SODIUM 100 MG/ML
40 INJECTION SUBCUTANEOUS EVERY 24 HOURS
Refills: 0 | Status: DISCONTINUED | OUTPATIENT
Start: 2025-07-06 | End: 2025-07-06

## 2025-07-06 RX ORDER — CEFEPIME 2 G/20ML
1000 INJECTION, POWDER, FOR SOLUTION INTRAVENOUS ONCE
Refills: 0 | Status: DISCONTINUED | OUTPATIENT
Start: 2025-07-06 | End: 2025-07-06

## 2025-07-06 RX ORDER — HYDROCORTISONE 20 MG
50 TABLET ORAL EVERY 6 HOURS
Refills: 0 | Status: DISCONTINUED | OUTPATIENT
Start: 2025-07-06 | End: 2025-07-07

## 2025-07-06 RX ORDER — CEFEPIME 2 G/20ML
1000 INJECTION, POWDER, FOR SOLUTION INTRAVENOUS ONCE
Refills: 0 | Status: COMPLETED | OUTPATIENT
Start: 2025-07-06 | End: 2025-07-06

## 2025-07-06 RX ORDER — CEFEPIME 2 G/20ML
1000 INJECTION, POWDER, FOR SOLUTION INTRAVENOUS EVERY 12 HOURS
Refills: 0 | Status: DISCONTINUED | OUTPATIENT
Start: 2025-07-06 | End: 2025-07-08

## 2025-07-06 RX ORDER — VANCOMYCIN HCL IN 5 % DEXTROSE 1.5G/250ML
750 PLASTIC BAG, INJECTION (ML) INTRAVENOUS ONCE
Refills: 0 | Status: COMPLETED | OUTPATIENT
Start: 2025-07-06 | End: 2025-07-06

## 2025-07-06 RX ORDER — MIDODRINE HYDROCHLORIDE 5 MG/1
10 TABLET ORAL EVERY 8 HOURS
Refills: 0 | Status: DISCONTINUED | OUTPATIENT
Start: 2025-07-06 | End: 2025-07-10

## 2025-07-06 RX ADMIN — Medication 500 MILLILITER(S): at 14:04

## 2025-07-06 RX ADMIN — MIDODRINE HYDROCHLORIDE 10 MILLIGRAM(S): 5 TABLET ORAL at 16:01

## 2025-07-06 RX ADMIN — Medication 1000 MILLILITER(S): at 10:10

## 2025-07-06 RX ADMIN — Medication 250 MILLIGRAM(S): at 13:06

## 2025-07-06 RX ADMIN — MIDODRINE HYDROCHLORIDE 10 MILLIGRAM(S): 5 TABLET ORAL at 21:49

## 2025-07-06 RX ADMIN — Medication 50 MILLIGRAM(S): at 17:04

## 2025-07-06 RX ADMIN — CEFEPIME 1000 MILLIGRAM(S): 2 INJECTION, POWDER, FOR SOLUTION INTRAVENOUS at 21:49

## 2025-07-06 RX ADMIN — Medication 1000 MILLILITER(S): at 11:28

## 2025-07-06 RX ADMIN — CEFEPIME 1000 MILLIGRAM(S): 2 INJECTION, POWDER, FOR SOLUTION INTRAVENOUS at 13:06

## 2025-07-06 RX ADMIN — Medication 4 MILLIGRAM(S): at 11:28

## 2025-07-06 RX ADMIN — Medication 400 MILLIGRAM(S): at 11:32

## 2025-07-06 RX ADMIN — Medication 50 MILLIGRAM(S): at 23:30

## 2025-07-06 NOTE — ED PROVIDER NOTE - OBJECTIVE STATEMENT
Pt is a 74y female with a PMHx of lung CA on RT (last treatment Thursday), severe aortic stenosis s/p TAVR on Oct , 2019 (only on aspirin), OA, gastric bypass 2004, and hypothyroidism who presents to the ED BIBEMS for generalized weakness and poor PO intake for the last two weeks. The patient lives at home with her . For the last 2 to 3 days, the patient has been unable to eat. Today, the patient was unable to walk due to weakness which prompted an EMS call. Endorses nausea. Denies vomiting or pain.

## 2025-07-06 NOTE — PROVIDER CONTACT NOTE (CHANGE IN STATUS NOTIFICATION) - RECOMMENDATIONS
Discussed patient's status with Ortho resident Dr. Allen. Confirmed patient is optimized for surgery.

## 2025-07-06 NOTE — H&P ADULT - ASSESSMENT
Patient with hx. of metastatic lung cancer, to adrenal glands  presents with fever, possible sepsis    1. Sepsis, Septic shock Present on Admission  2. NSCLCA  3. Fever  4. possible adrenal insufficiency  5. right knee swellling, gout vs. infected Joint    Possible sepsis, source not clear, cxr clear u/a negative, has TAVR, check cultures  empiric cefepime,   has mets to adrenal glands hypotension may be due to adrenal insufficiency, will start  Stress Dose steroids  will ask Ortho to see patient to r/u septic knee

## 2025-07-06 NOTE — PATIENT PROFILE ADULT - FALL HARM RISK - HARM RISK INTERVENTIONS
Assistance with ambulation/Assistance OOB with selected safe patient handling equipment/Communicate Risk of Fall with Harm to all staff/Discuss with provider need for PT consult/Monitor gait and stability/Reinforce activity limits and safety measures with patient and family/Tailored Fall Risk Interventions/Visual Cue: Yellow wristband and red socks/Bed in lowest position, wheels locked, appropriate side rails in place/Call bell, personal items and telephone in reach/Instruct patient to call for assistance before getting out of bed or chair/Non-slip footwear when patient is out of bed/[place] to call system/Physically safe environment - no spills, clutter or unnecessary equipment/Purposeful Proactive Rounding/Room/bathroom lighting operational, light cord in reach Assistance with ambulation/Assistance OOB with selected safe patient handling equipment/Communicate Risk of Fall with Harm to all staff/Discuss with provider need for PT consult/Monitor gait and stability/Provide patient with walking aids - walker, cane, crutches/Reinforce activity limits and safety measures with patient and family/Tailored Fall Risk Interventions/Visual Cue: Yellow wristband and red socks/Bed in lowest position, wheels locked, appropriate side rails in place/Call bell, personal items and telephone in reach/Instruct patient to call for assistance before getting out of bed or chair/Non-slip footwear when patient is out of bed/[place] to call system/Physically safe environment - no spills, clutter or unnecessary equipment/Purposeful Proactive Rounding/Room/bathroom lighting operational, light cord in reach

## 2025-07-06 NOTE — PHARMACOTHERAPY INTERVENTION NOTE - COMMENTS
Medication history complete, reviewed medications with patient spouse and confirmed with doctor first med hx.

## 2025-07-06 NOTE — PATIENT PROFILE ADULT - PACKS PER DAY
[address]    10/17/2019    Date of Admission:  10/17/2019      Subjective:     Patient is a 59 y.o.  female presents for screening colonoscopy. The patient reports no problems. The patient denies family history of colon cancer. The patient has had a colonoscopy in the past. Her last colonoscopy was 10 years ago for upset stomach. Otherwise there is no reported rectal bleeding or melena. No changes in appetite or unusual wt loss. No abdominal pain or bloating. No reported changes in bowel habits. Patient Active Problem List    Diagnosis Date Noted    Screen for colon cancer 10/17/2019    Osteopenia determined by x-ray 10/06/2019    Hyperlipidemia LDL goal <100 08/27/2019    Urinary tract infection with hematuria 08/27/2019     Past Medical History:   Diagnosis Date    Hyperlipidemia LDL goal <100 8/27/2019    Osteopenia determined by x-ray 10/6/2019      Past Surgical History:   Procedure Laterality Date     [facility] [address]      Prior to Admission Medications   Prescriptions Last Dose Informant Patient Reported? Taking?   ergocalciferol (VITAMIN D2) 50,000 unit capsule   No No   Sig: Take 1 Cap by mouth every seven (7) days. estradiol (VAGIFEM) 10 mcg tab vaginal tablet   No No   Sig: Insert 1 Tab into vagina every Monday and Thursday. Indications: vaginal inflammation due to loss of hormone stimulation      Facility-Administered Medications: None     No Known Allergies   Social History     Tobacco Use    Smoking status: Never Smoker    Smokeless tobacco: Never Used   Substance Use Topics    Alcohol use: Yes     Comment: rarely      Social History     Social History Narrative    Not on file     Family History   Problem Relation Age of Onset    Dementia Mother [de-identified]        No current facility-administered medications for this encounter. Review of Systems  A comprehensive review of systems was negative except for that written in the HPI. Objective:      There were no vitals filed for this visit. PHYSICAL EXAM   Physical Examination: General appearance - alert, well appearing, and in no distress  Mental status - alert, oriented to person, place, and time  Eyes - pupils equal and reactive, extraocular eye movements intact  Nose - normal and patent, no erythema, discharge or polyps  Neck - supple, no significant adenopathy  Chest - clear to auscultation, no wheezes, rales or rhonchi, symmetric air entry  Heart - normal rate, regular rhythm, normal S1, S2, no murmurs, rubs, clicks or gallops  Abdomen - soft, nontender, nondistended, no masses or organomegaly  Neurological - alert, oriented, normal speech, no focal findings or movement disorder noted  Musculoskeletal - no joint tenderness, deformity or swelling  Extremities - peripheral pulses normal, no pedal edema, no clubbing or cyanosis  Skin - normal coloration and turgor, no rashes, no suspicious skin lesions noted      No results for input(s): WBC, HGB, HCT, PLT, HGBEXT, HCTEXT, PLTEXT in the last 72 hours. No results for input(s): NA, K, CL, GLU, CO2, BUN, CREA, MG, PHOS, TROIQ, INR, BNPP, INREXT in the last 72 hours. No lab exists for component: TROIP  No results for input(s): PH, PCO2, PO2, HCO3 in the last 72 hours.     Assessment:     Hospital Problems  Date Reviewed: 9/19/2019          Codes Class Noted POA    * (Principal) Screen for colon cancer ICD-10-CM: Z12.11  ICD-9-CM: V76.51  10/17/2019 Unknown            Plan:   Screening colonoscopy        Jesisca Loredo DO 1

## 2025-07-06 NOTE — H&P ADULT - HISTORY OF PRESENT ILLNESS
This Patient is a 74 year old female     with PMH of Previous TAVR     Non- small Cell Lung CA, mets to adrenal glands,  never had surgery, having RT to adrenal glands    NATHALY    s/p Gastric Bypass    Hypothyroidism    The patient received RT recently, precents with weakness and 3 days of poor PO intake and fevers  also complaining of right knee pain  In ED bp to 70s,  temp to 101 received fluid with MAP around 65  was on decadron

## 2025-07-06 NOTE — ED PROVIDER NOTE - CLINICAL SUMMARY MEDICAL DECISION MAKING FREE TEXT BOX
74y female with lung cancer p/w weakness and poor PO intake. Will do labs including blood cultures, rectal temp, Zofran, Tylenol, Ofirmev, and IVF. Likely admission.

## 2025-07-06 NOTE — ED PROVIDER NOTE - CRITICAL CARE ATTENDING CONTRIBUTION TO CARE
direct patient care (not related to procedure), additional history taking, interpretation of diagnostic studies, documentation, consultation with other physicians, consult w/ pt's family directly relating to pts condition  B Rafat IRVING

## 2025-07-06 NOTE — ED ADULT NURSE NOTE - OBJECTIVE STATEMENT
74y female presented to the ED with complaints of generalized weakness over the past 2 weeks, decreased PO intake for the past 2 days. pt states she was not able to get out of bed today due to weakness. Pt with hx of renal tumor. last radiation was Thursday. Pt has right chest port not accessed.

## 2025-07-06 NOTE — ED PROVIDER NOTE - PROGRESS NOTE DETAILS
spoke with Dr Sahu for admission, BP must remain over 90mmhg for acceptance to medicine, will so to Dr Fu to re check blood pressure B Rafat IRVING BP found to be 82/52, spoke with ICU HALLIE Wagoner who will come down to see the patient. so to Dr Fu pt tba icu vs floor Rafat IRVING CC:  SICU admission accepted under Dr. Fonseca

## 2025-07-06 NOTE — ED PROVIDER NOTE - PHYSICAL EXAMINATION
Gen: Chronically ill-appearing, and pale  Head:  NC/AT  HEENT: pupils perrl,no pharyngeal erythema, uvula midline, +dry MM  Cardiac: S1S2, RRR  Abd: Soft, non tender  Resp: No distress, CTA   musculoskeletal:: no deformities, no swelling, strength +5/+5  Skin: warm and dry as visualized, no rashes  Neuro: WISE, aao x 4  Psych:alert, cooperative, appropriate mood and affect for situation

## 2025-07-06 NOTE — H&P ADULT - NSHPREVIEWOFSYSTEMS_GEN_ALL_CORE
weakness  fever  no chest pain  no shortness of breathe  no abdominal pain  positive right knee pain

## 2025-07-06 NOTE — ED ADULT NURSE NOTE - NSFALLRISKINTERV_ED_ALL_ED
Assistance OOB with selected safe patient handling equipment if applicable/Assistance with ambulation/Communicate fall risk and risk factors to all staff, patient, and family/Provide patient with walking aids/Provide visual cue: yellow wristband, yellow gown, etc/Reinforce activity limits and safety measures with patient and family/Call bell, personal items and telephone in reach/Instruct patient to call for assistance before getting out of bed/chair/stretcher/Non-slip footwear applied when patient is off stretcher/[place] to call system/Physically safe environment - no spills, clutter or unnecessary equipment/Purposeful Proactive Rounding/Room/bathroom lighting operational, light cord in reach

## 2025-07-06 NOTE — CONSULT NOTE ADULT - SUBJECTIVE AND OBJECTIVE BOX
HPI  74yFemale w/ PMHx of Stage IV lung cancer with mets to Adrenals, and TAVR, admitted for sepsis now c/o atraumatic R knee pain that started on Friday. Able to bear weight in the RLE since sxs began, but painful. Has been having fevers on and off, temp today 101F. Denies numbness/tingling in the RLE. Denies any recent trauma/injuries/sugery/injections. Denies hx of crystal arthropathy or other inflammatory joint disorders, IV drug use, new sexual encounters/STIs, or other infections. At baseline, community ambulator w/ walker at baseline. Hx of L TKA 30 years ago.     ROS  Negative unless otherwise specified in HPI.    PAST MEDICAL & SURGICAL Hx  PAST MEDICAL & SURGICAL HISTORY:  Hypothyroidism      Osteoarthritis  left knee arthroscopy       Eczema      Aortic stenosis      HTN (hypertension)      History of Appendectomy       Section   d/t failure to progress and Repeat       Parotid Mass Removed       S/P left Bunionectomy       Abdominal cyst removed       Gastric Bypass  for Obesity       Vocal Cord Polyp Removed   Denies current vocal cord polyps      H/O total knee replacement, left      History of cataract surgery      History of transcatheter aortic valve replacement (TAVR)      S/P aortic valve replacement          MEDICATIONS  Home Medications:  acetaminophen 500 mg oral tablet: 2 tab(s) orally once a day (at bedtime) (2025 15:47)  bumetanide 1 mg oral tablet: 1 tab(s) orally once a day AM (2025 15:31)  dexAMETHasone 2 mg oral tablet: 1 tab(s) orally 2 times a day (2025 15:47)  Farxiga 5 mg oral tablet: 1 tab(s) orally once a day (2025 15:53)  finasteride 5 mg oral tablet: 1 tab(s) orally once a day AM (2025 15:27)  levothyroxine 137 mcg (0.137 mg) oral tablet: 1 tab(s) orally (2025 15:52)  LORazepam 0.5 mg oral tablet: orally (2025 15:47)  losartan 25 mg oral tablet: 0.5 tab(s) orally once a day Lunchtime (2025 15:31)  metoprolol succinate 25 mg oral tablet, extended release: 1 tab(s) orally once a day Lunch time (2025 15:31)  oxyCODONE 5 mg oral tablet: 1 tab(s) orally *** Take 1 to 2 tabs 4 times a day*** (2025 15:47)  Senna 8.6 mg oral tablet: 2 tab(s) orally 2 times a day (2025 15:47)  spironolactone 25 mg oral tablet: 0.5 tab(s) orally once a day AM (2025 15:31)  Tylenol 325 mg oral tablet: 2 tab(s) orally every 6 hours as needed for  mild pain (2025 15:31)      ALLERGIES  No Known Allergies      FAMILY Hx  FAMILY HISTORY:  No pertinent family history in first degree relatives        SOCIAL Hx  Social History:  quit smoking 30 year ago (2025 15:40)      VITALS  Vital Signs Last 24 Hrs  T(C): 36.7 (2025 17:44), Max: 38.3 (2025 10:30)  T(F): 98.1 (2025 17:44), Max: 101 (2025 10:30)  HR: 67 (2025 18:00) (60 - 88)  BP: 96/55 (2025 18:00) (79/55 - 100/57)  BP(mean): 69 (2025 18:00) (64 - 94)  RR: 13 (2025 18:00) (12 - 18)  SpO2: 100% (2025 18:00) (97% - 100%)    Parameters below as of 2025 17:44  Patient On (Oxygen Delivery Method): room air        PHYSICAL EXAM  Gen: Lying in bed, non-toxic appearing, NAD  Resp: No increased WOB  RLE:  Skin intact, +effusion and no erythema over R knee  +TTP over medial aspect of knee, warmer to touch relative to L side; compartments soft  L knee passive ROM 60-70 deg, limited 2/2 pain  No pain with micromotion  Motor: TA/EHL/GS/FHL intact  Sensory: DP/SP/Tib/Deborah/Saph SILT  +DP pulse, WWP    LABS                        7.9    13.91 )-----------( 282      ( 2025 10:05 )             26.3     07-06    133[L]  |  104  |  22  ----------------------------<  122[H]  4.5   |  23  |  0.92    Ca    8.2[L]      2025 10:05    TPro  6.6  /  Alb  2.6[L]  /  TBili  0.5  /  DBili  x   /  AST  5[L]  /  ALT  7[L]  /  AlkPhos  68  07-06          Urinalysis with Rflx Culture (collected 2025 13:56)        IMAGING  XRs: R knee w/ severe OA, but no acute fx or dislocation (personal read)        ASSESSMENT & PLAN  74yFemale w/ R knee pain, XR R knee showing severe OA. ESR, CRP and blood cx pending. Patients pain has improved since getting steroids. Was given vanco and cefepime.     *****Incomplete******  -WBAT RLE, ACE wrap PRN  -f/u ESR and CRP, BCx  -keep NPO until decision to tap knee is made  -pain control  -ice/cold compress, elevation  ****Incomplete******  To discuss with Dr. Gonzalez for any further recommendations and management  HPI  74yFemale w/ PMHx of Stage IV lung cancer with mets to Adrenals, and TAVR, admitted for sepsis now c/o atraumatic R knee pain that started on Friday. Able to bear weight in the RLE since sxs began, but painful. Has been having fevers on and off, temp today 101F. Denies numbness/tingling in the RLE. Denies any recent trauma/injuries/sugery/injections. Denies hx of crystal arthropathy or other inflammatory joint disorders, IV drug use, new sexual encounters/STIs, or other infections. At baseline, community ambulator w/ walker at baseline. Hx of L TKA 30 years ago.     ROS  Negative unless otherwise specified in HPI.    PAST MEDICAL & SURGICAL Hx  PAST MEDICAL & SURGICAL HISTORY:  Hypothyroidism      Osteoarthritis  left knee arthroscopy       Eczema      Aortic stenosis      HTN (hypertension)      History of Appendectomy       Section   d/t failure to progress and Repeat       Parotid Mass Removed       S/P left Bunionectomy       Abdominal cyst removed       Gastric Bypass  for Obesity       Vocal Cord Polyp Removed   Denies current vocal cord polyps      H/O total knee replacement, left      History of cataract surgery      History of transcatheter aortic valve replacement (TAVR)      S/P aortic valve replacement          MEDICATIONS  Home Medications:  acetaminophen 500 mg oral tablet: 2 tab(s) orally once a day (at bedtime) (2025 15:47)  bumetanide 1 mg oral tablet: 1 tab(s) orally once a day AM (2025 15:31)  dexAMETHasone 2 mg oral tablet: 1 tab(s) orally 2 times a day (2025 15:47)  Farxiga 5 mg oral tablet: 1 tab(s) orally once a day (2025 15:53)  finasteride 5 mg oral tablet: 1 tab(s) orally once a day AM (2025 15:27)  levothyroxine 137 mcg (0.137 mg) oral tablet: 1 tab(s) orally (2025 15:52)  LORazepam 0.5 mg oral tablet: orally (2025 15:47)  losartan 25 mg oral tablet: 0.5 tab(s) orally once a day Lunchtime (2025 15:31)  metoprolol succinate 25 mg oral tablet, extended release: 1 tab(s) orally once a day Lunch time (2025 15:31)  oxyCODONE 5 mg oral tablet: 1 tab(s) orally *** Take 1 to 2 tabs 4 times a day*** (2025 15:47)  Senna 8.6 mg oral tablet: 2 tab(s) orally 2 times a day (2025 15:47)  spironolactone 25 mg oral tablet: 0.5 tab(s) orally once a day AM (2025 15:31)  Tylenol 325 mg oral tablet: 2 tab(s) orally every 6 hours as needed for  mild pain (2025 15:31)      ALLERGIES  No Known Allergies      FAMILY Hx  FAMILY HISTORY:  No pertinent family history in first degree relatives        SOCIAL Hx  Social History:  quit smoking 30 year ago (2025 15:40)      VITALS  Vital Signs Last 24 Hrs  T(C): 36.7 (2025 17:44), Max: 38.3 (2025 10:30)  T(F): 98.1 (2025 17:44), Max: 101 (2025 10:30)  HR: 67 (2025 18:00) (60 - 88)  BP: 96/55 (2025 18:00) (79/55 - 100/57)  BP(mean): 69 (2025 18:00) (64 - 94)  RR: 13 (2025 18:00) (12 - 18)  SpO2: 100% (2025 18:00) (97% - 100%)    Parameters below as of 2025 17:44  Patient On (Oxygen Delivery Method): room air        PHYSICAL EXAM  Gen: Lying in bed, non-toxic appearing, NAD  Resp: No increased WOB  RLE:  Skin intact, +effusion and no erythema over R knee  +TTP over medial aspect of knee, warmer to touch relative to L side; compartments soft  L knee passive ROM 60-70 deg, limited 2/2 pain  No pain with micromotion  Motor: TA/EHL/GS/FHL intact  Sensory: DP/SP/Tib/Deborah/Saph SILT  +DP pulse, WWP    LABS                        7.9    13.91 )-----------( 282      ( 2025 10:05 )             26.3     07-06    133[L]  |  104  |  22  ----------------------------<  122[H]  4.5   |  23  |  0.92    Ca    8.2[L]      2025 10:05    TPro  6.6  /  Alb  2.6[L]  /  TBili  0.5  /  DBili  x   /  AST  5[L]  /  ALT  7[L]  /  AlkPhos  68  07-06          Urinalysis with Rflx Culture (collected 2025 13:56)        IMAGING  XRs: R knee w/ severe OA, but no acute fx or dislocation (personal read)    Procedure:  Using aseptic technique, the knee was aspirated using an 18G needle via a superolateral approach.   Fluid quality: yellow, cloudy  Volume aspirated: 40cc  Pt tolerated procedure well without complications, fluid was sent to lab for analysis of cell count/gram stain/crystals/culture    ASSESSMENT & PLAN  74yFemale w/ R knee pain, XR R knee showing severe OA. ESR, CRP and blood cx pending. Patients pain has improved since getting steroids. Was given vanco and cefepime.     -Knee aspirated - sent for CC/Crystals/GS/Cx/PCR - keep NPO until cell count back  -  -WBAT RLE, ACE wrap PRN  -f/u ESR and BCx  -Please document OR optimization in event knee is septic and needs to be washed out  -pain control  -ice/cold compress, elevation    Discussed with Dr. Gonzalez who is in agreement with above plan   HPI  74yFemale w/ PMHx of Stage IV lung cancer with mets to Adrenals, and TAVR, admitted for sepsis now c/o atraumatic R knee pain that started on Friday. Able to bear weight in the RLE since sxs began, but painful. Has been having fevers on and off, temp today 101F. Denies numbness/tingling in the RLE. Denies any recent trauma/injuries/sugery/injections. Denies hx of crystal arthropathy or other inflammatory joint disorders, IV drug use, new sexual encounters/STIs, or other infections. At baseline, community ambulator w/ walker at baseline. Hx of L TKA 30 years ago.     ROS  Negative unless otherwise specified in HPI.    PAST MEDICAL & SURGICAL Hx  PAST MEDICAL & SURGICAL HISTORY:  Hypothyroidism      Osteoarthritis  left knee arthroscopy       Eczema      Aortic stenosis      HTN (hypertension)      History of Appendectomy       Section   d/t failure to progress and Repeat       Parotid Mass Removed       S/P left Bunionectomy       Abdominal cyst removed       Gastric Bypass  for Obesity       Vocal Cord Polyp Removed   Denies current vocal cord polyps      H/O total knee replacement, left      History of cataract surgery      History of transcatheter aortic valve replacement (TAVR)      S/P aortic valve replacement          MEDICATIONS  Home Medications:  acetaminophen 500 mg oral tablet: 2 tab(s) orally once a day (at bedtime) (2025 15:47)  bumetanide 1 mg oral tablet: 1 tab(s) orally once a day AM (2025 15:31)  dexAMETHasone 2 mg oral tablet: 1 tab(s) orally 2 times a day (2025 15:47)  Farxiga 5 mg oral tablet: 1 tab(s) orally once a day (2025 15:53)  finasteride 5 mg oral tablet: 1 tab(s) orally once a day AM (2025 15:27)  levothyroxine 137 mcg (0.137 mg) oral tablet: 1 tab(s) orally (2025 15:52)  LORazepam 0.5 mg oral tablet: orally (2025 15:47)  losartan 25 mg oral tablet: 0.5 tab(s) orally once a day Lunchtime (2025 15:31)  metoprolol succinate 25 mg oral tablet, extended release: 1 tab(s) orally once a day Lunch time (2025 15:31)  oxyCODONE 5 mg oral tablet: 1 tab(s) orally *** Take 1 to 2 tabs 4 times a day*** (2025 15:47)  Senna 8.6 mg oral tablet: 2 tab(s) orally 2 times a day (2025 15:47)  spironolactone 25 mg oral tablet: 0.5 tab(s) orally once a day AM (2025 15:31)  Tylenol 325 mg oral tablet: 2 tab(s) orally every 6 hours as needed for  mild pain (2025 15:31)      ALLERGIES  No Known Allergies      FAMILY Hx  FAMILY HISTORY:  No pertinent family history in first degree relatives        SOCIAL Hx  Social History:  quit smoking 30 year ago (2025 15:40)      VITALS  Vital Signs Last 24 Hrs  T(C): 36.7 (2025 17:44), Max: 38.3 (2025 10:30)  T(F): 98.1 (2025 17:44), Max: 101 (2025 10:30)  HR: 67 (2025 18:00) (60 - 88)  BP: 96/55 (2025 18:00) (79/55 - 100/57)  BP(mean): 69 (2025 18:00) (64 - 94)  RR: 13 (2025 18:00) (12 - 18)  SpO2: 100% (2025 18:00) (97% - 100%)    Parameters below as of 2025 17:44  Patient On (Oxygen Delivery Method): room air        PHYSICAL EXAM  Gen: Lying in bed, non-toxic appearing, NAD  Resp: No increased WOB  RLE:  Skin intact, +effusion and no erythema over R knee  +TTP over medial aspect of knee, warmer to touch relative to L side; compartments soft  L knee passive ROM 60-70 deg, limited 2/2 pain  No pain with micromotion  Motor: TA/EHL/GS/FHL intact  Sensory: DP/SP/Tib/Deborah/Saph SILT  +DP pulse, WWP    LABS                        7.9    13.91 )-----------( 282      ( 2025 10:05 )             26.3     07-06    133[L]  |  104  |  22  ----------------------------<  122[H]  4.5   |  23  |  0.92    Ca    8.2[L]      2025 10:05    TPro  6.6  /  Alb  2.6[L]  /  TBili  0.5  /  DBili  x   /  AST  5[L]  /  ALT  7[L]  /  AlkPhos  68  07-06          Urinalysis with Rflx Culture (collected 2025 13:56)        IMAGING  XRs: R knee w/ severe OA, but no acute fx or dislocation (personal read)    Procedure:  Using aseptic technique, the knee was aspirated using an 18G needle via a superolateral approach.   Fluid quality: yellow, cloudy  Volume aspirated: 40cc  Pt tolerated procedure well without complications, fluid was sent to lab for analysis of cell count/gram stain/crystals/culture    ASSESSMENT & PLAN  74yFemale w/ R knee pain, XR R knee showing severe OA. ESR, CRP and blood cx pending. Patients pain has improved since getting steroids. Was given vanco and cefepime.     -Knee aspirated - sent for CC/Crystals/GS/Cx/PCR - CC: 26k, 95% PMNs, Crystals + for CPPD - Likely pseudogout  -FU GS/Cx/PCR  -NPO after midnight incase PCR comes back + over night for possible washout tomorrow   -Hold chemical dvt ppx  -  -ESR 83  -WBAT RLE, ACE wrap PRN  -f/u BCx  -Please document OR optimization in event knee is septic and needs to be washed out  -pain control  -ice/cold compress, elevation    Discussed with Dr. Gonzalez who is in agreement with above plan

## 2025-07-06 NOTE — ED PROVIDER NOTE - WR ORDER NAME 1
Do you want to see her in clinic first or repeat titers? Your last result note to follow up to repeat labs after age 1    Please advise.  
Repeat immune labs entered. Pending results we can fu in clinic. Can have labs drawn at [facility] or any other [facility] lab.  Would defer any further possible allergy testing until a clinic visit where I can get a good interval hx.  
Xray Chest 1 View- PORTABLE-Urgent

## 2025-07-06 NOTE — ED ADULT TRIAGE NOTE - CHIEF COMPLAINT QUOTE
Pt BIBEMS c/o generalized weakness and decreased po intake x 2 weeks. Denies fevers, n/v/d or chest pain. hx hypotension and renal tumor on active radiation last txt Thursday.

## 2025-07-06 NOTE — H&P ADULT - NSHPPHYSICALEXAM_GEN_ALL_CORE
General - frail cachetic  HEENT - nc/at  neck supple  lungs clear  cv rrr  abdomen soft, non tender  extremities - right knee swollen and tender  neuro -awake, alert appropriate

## 2025-07-07 ENCOUNTER — RESULT REVIEW (OUTPATIENT)
Age: 75
End: 2025-07-07

## 2025-07-07 LAB
ANION GAP SERPL CALC-SCNC: 4 MMOL/L — LOW (ref 5–17)
APTT BLD: 28.7 SEC — SIGNIFICANT CHANGE UP (ref 26.1–36.8)
BASOPHILS # BLD AUTO: 0.1 K/UL — SIGNIFICANT CHANGE UP (ref 0–0.2)
BASOPHILS NFR BLD AUTO: 0.6 % — SIGNIFICANT CHANGE UP (ref 0–2)
BUN SERPL-MCNC: 19 MG/DL — SIGNIFICANT CHANGE UP (ref 7–23)
CALCIUM SERPL-MCNC: 7.5 MG/DL — LOW (ref 8.5–10.1)
CHLORIDE SERPL-SCNC: 110 MMOL/L — HIGH (ref 96–108)
CO2 SERPL-SCNC: 22 MMOL/L — SIGNIFICANT CHANGE UP (ref 22–31)
CREAT SERPL-MCNC: 0.64 MG/DL — SIGNIFICANT CHANGE UP (ref 0.5–1.3)
CULTURE RESULTS: SIGNIFICANT CHANGE UP
EGFR: 92 ML/MIN/1.73M2 — SIGNIFICANT CHANGE UP
EGFR: 92 ML/MIN/1.73M2 — SIGNIFICANT CHANGE UP
EOSINOPHIL # BLD AUTO: 0.03 K/UL — SIGNIFICANT CHANGE UP (ref 0–0.5)
EOSINOPHIL NFR BLD AUTO: 0.2 % — SIGNIFICANT CHANGE UP (ref 0–6)
FERRITIN SERPL-MCNC: 497 NG/ML — HIGH (ref 13–330)
FLUAV AG NPH QL: SIGNIFICANT CHANGE UP
FLUBV AG NPH QL: SIGNIFICANT CHANGE UP
FOLATE SERPL-MCNC: >20 NG/ML — SIGNIFICANT CHANGE UP
GLUCOSE SERPL-MCNC: 125 MG/DL — HIGH (ref 70–99)
GRAM STN FLD: SIGNIFICANT CHANGE UP
HCT VFR BLD CALC: 27.8 % — LOW (ref 34.5–45)
HGB BLD-MCNC: 8.8 G/DL — LOW (ref 11.5–15.5)
IMM GRANULOCYTES # BLD AUTO: 0.29 K/UL — HIGH (ref 0–0.07)
IMM GRANULOCYTES NFR BLD AUTO: 1.7 % — HIGH (ref 0–0.9)
INR BLD: 1.14 RATIO — SIGNIFICANT CHANGE UP (ref 0.85–1.16)
IRON SATN MFR SERPL: 10 % — LOW (ref 14–50)
IRON SATN MFR SERPL: 16 UG/DL — LOW (ref 30–160)
JOINT PATHOGENS PNL SNV NAA+NON-PROBE: SIGNIFICANT CHANGE UP
JOINT PCR SOURCE: SIGNIFICANT CHANGE UP
LYMPHOCYTES # BLD AUTO: 0.27 K/UL — LOW (ref 1–3.3)
LYMPHOCYTES NFR BLD AUTO: 1.6 % — LOW (ref 13–44)
MAGNESIUM SERPL-MCNC: 2.4 MG/DL — SIGNIFICANT CHANGE UP (ref 1.6–2.6)
MCHC RBC-ENTMCNC: 26.7 PG — LOW (ref 27–34)
MCHC RBC-ENTMCNC: 31.7 G/DL — LOW (ref 32–36)
MCV RBC AUTO: 84.5 FL — SIGNIFICANT CHANGE UP (ref 80–100)
MONOCYTES # BLD AUTO: 0.63 K/UL — SIGNIFICANT CHANGE UP (ref 0–0.9)
MONOCYTES NFR BLD AUTO: 3.8 % — SIGNIFICANT CHANGE UP (ref 2–14)
MRSA PCR RESULT.: SIGNIFICANT CHANGE UP
NEUTROPHILS # BLD AUTO: 15.46 K/UL — HIGH (ref 1.8–7.4)
NEUTROPHILS NFR BLD AUTO: 92.1 % — HIGH (ref 43–77)
NRBC # BLD AUTO: 0 K/UL — SIGNIFICANT CHANGE UP (ref 0–0)
NRBC # FLD: 0 K/UL — SIGNIFICANT CHANGE UP (ref 0–0)
NRBC BLD AUTO-RTO: 0 /100 WBCS — SIGNIFICANT CHANGE UP (ref 0–0)
PLATELET # BLD AUTO: 271 K/UL — SIGNIFICANT CHANGE UP (ref 150–400)
PMV BLD: 10 FL — SIGNIFICANT CHANGE UP (ref 7–13)
POTASSIUM SERPL-MCNC: 4.8 MMOL/L — SIGNIFICANT CHANGE UP (ref 3.5–5.3)
POTASSIUM SERPL-SCNC: 4.8 MMOL/L — SIGNIFICANT CHANGE UP (ref 3.5–5.3)
PROTHROM AB SERPL-ACNC: 13.1 SEC — SIGNIFICANT CHANGE UP (ref 9.9–13.4)
RBC # BLD: 3.29 M/UL — LOW (ref 3.8–5.2)
RBC # FLD: 15.8 % — HIGH (ref 10.3–14.5)
RSV RNA NPH QL NAA+NON-PROBE: SIGNIFICANT CHANGE UP
S AUREUS DNA NOSE QL NAA+PROBE: SIGNIFICANT CHANGE UP
SARS-COV-2 RNA SPEC QL NAA+PROBE: SIGNIFICANT CHANGE UP
SODIUM SERPL-SCNC: 136 MMOL/L — SIGNIFICANT CHANGE UP (ref 135–145)
SOURCE RESPIRATORY: SIGNIFICANT CHANGE UP
SPECIMEN SOURCE: SIGNIFICANT CHANGE UP
SPECIMEN SOURCE: SIGNIFICANT CHANGE UP
TIBC SERPL-MCNC: 164 UG/DL — LOW (ref 220–430)
TSH SERPL-MCNC: 4.26 UU/ML — SIGNIFICANT CHANGE UP (ref 0.34–4.82)
UIBC SERPL-MCNC: 148 UG/DL — SIGNIFICANT CHANGE UP (ref 110–370)
VIT B12 SERPL-MCNC: >2000 PG/ML — HIGH (ref 232–1245)
WBC # BLD: 16.78 K/UL — HIGH (ref 3.8–10.5)
WBC # FLD AUTO: 16.78 K/UL — HIGH (ref 3.8–10.5)

## 2025-07-07 PROCEDURE — 99223 1ST HOSP IP/OBS HIGH 75: CPT

## 2025-07-07 PROCEDURE — 99233 SBSQ HOSP IP/OBS HIGH 50: CPT

## 2025-07-07 PROCEDURE — 93306 TTE W/DOPPLER COMPLETE: CPT | Mod: 26

## 2025-07-07 RX ORDER — LEVOTHYROXINE SODIUM 300 MCG
137 TABLET ORAL DAILY
Refills: 0 | Status: DISCONTINUED | OUTPATIENT
Start: 2025-07-07 | End: 2025-07-10

## 2025-07-07 RX ORDER — POLYETHYLENE GLYCOL 3350 17 G/17G
17 POWDER, FOR SOLUTION ORAL DAILY
Refills: 0 | Status: DISCONTINUED | OUTPATIENT
Start: 2025-07-07 | End: 2025-07-10

## 2025-07-07 RX ORDER — ENOXAPARIN SODIUM 100 MG/ML
40 INJECTION SUBCUTANEOUS EVERY 24 HOURS
Refills: 0 | Status: DISCONTINUED | OUTPATIENT
Start: 2025-07-07 | End: 2025-07-10

## 2025-07-07 RX ORDER — ONDANSETRON HCL/PF 4 MG/2 ML
4 VIAL (ML) INJECTION EVERY 8 HOURS
Refills: 0 | Status: DISCONTINUED | OUTPATIENT
Start: 2025-07-07 | End: 2025-07-10

## 2025-07-07 RX ORDER — ACETAMINOPHEN 500 MG/5ML
650 LIQUID (ML) ORAL EVERY 6 HOURS
Refills: 0 | Status: DISCONTINUED | OUTPATIENT
Start: 2025-07-07 | End: 2025-07-10

## 2025-07-07 RX ORDER — MAGNESIUM, ALUMINUM HYDROXIDE 200-200 MG
30 TABLET,CHEWABLE ORAL EVERY 4 HOURS
Refills: 0 | Status: DISCONTINUED | OUTPATIENT
Start: 2025-07-07 | End: 2025-07-10

## 2025-07-07 RX ORDER — METHYLPREDNISOLONE ACETATE 80 MG/ML
40 INJECTION, SUSPENSION INTRA-ARTICULAR; INTRALESIONAL; INTRAMUSCULAR; SOFT TISSUE DAILY
Refills: 0 | Status: DISCONTINUED | OUTPATIENT
Start: 2025-07-07 | End: 2025-07-10

## 2025-07-07 RX ORDER — SENNA 187 MG
2 TABLET ORAL AT BEDTIME
Refills: 0 | Status: DISCONTINUED | OUTPATIENT
Start: 2025-07-07 | End: 2025-07-10

## 2025-07-07 RX ADMIN — Medication 50 MILLIGRAM(S): at 11:43

## 2025-07-07 RX ADMIN — METHYLPREDNISOLONE ACETATE 40 MILLIGRAM(S): 80 INJECTION, SUSPENSION INTRA-ARTICULAR; INTRALESIONAL; INTRAMUSCULAR; SOFT TISSUE at 17:20

## 2025-07-07 RX ADMIN — CEFEPIME 1000 MILLIGRAM(S): 2 INJECTION, POWDER, FOR SOLUTION INTRAVENOUS at 11:43

## 2025-07-07 RX ADMIN — CEFEPIME 1000 MILLIGRAM(S): 2 INJECTION, POWDER, FOR SOLUTION INTRAVENOUS at 22:26

## 2025-07-07 RX ADMIN — MIDODRINE HYDROCHLORIDE 10 MILLIGRAM(S): 5 TABLET ORAL at 22:30

## 2025-07-07 RX ADMIN — MIDODRINE HYDROCHLORIDE 10 MILLIGRAM(S): 5 TABLET ORAL at 05:06

## 2025-07-07 RX ADMIN — Medication 50 MILLIGRAM(S): at 05:06

## 2025-07-07 RX ADMIN — POLYETHYLENE GLYCOL 3350 17 GRAM(S): 17 POWDER, FOR SOLUTION ORAL at 11:44

## 2025-07-07 RX ADMIN — Medication 2 TABLET(S): at 22:26

## 2025-07-07 RX ADMIN — ENOXAPARIN SODIUM 40 MILLIGRAM(S): 100 INJECTION SUBCUTANEOUS at 11:44

## 2025-07-07 NOTE — DIETITIAN INITIAL EVALUATION ADULT - NSFNSGIIOFT_GEN_A_CORE
I&O's Detail    06 Jul 2025 07:01  -  07 Jul 2025 07:00  --------------------------------------------------------  IN:    PRBCs (Packed Red Blood Cells): 337 mL  Total IN: 337 mL    OUT:    Voided (mL): 350 mL  Total OUT: 350 mL    Total NET: -13 mL

## 2025-07-07 NOTE — PROGRESS NOTE ADULT - SUBJECTIVE AND OBJECTIVE BOX
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,  never had surgery, having RT to adrenal glands    NATHALY    s/p Gastric Bypass    Hypothyroidism    The patient received RT recently, precents with weakness and 3 days of poor PO intake and fevers  also complaining of right knee pain  In ED bp to 70s,  temp to 101 received fluid with MAP around 65  was on decadron (2025 15:40)    PAST MEDICAL & SURGICAL HISTORY:  Hypothyroidism      Osteoarthritis  left knee arthroscopy       Eczema      Aortic stenosis      HTN (hypertension)      History of Appendectomy       Section   d/t failure to progress and Repeat       Parotid Mass Removed       S/P left Bunionectomy       Abdominal cyst removed       Gastric Bypass  for Obesity       Vocal Cord Polyp Removed   Denies current vocal cord polyps      H/O total knee replacement, left      History of cataract surgery      History of transcatheter aortic valve replacement (TAVR)      S/P aortic valve replacement        FAMILY HISTORY:  No pertinent family history in first degree relatives      Social History:      Allergies    No Known Allergies    Intolerances        MEDICATIONS  (STANDING):  cefepime  Injectable. 1000 milliGRAM(s) IV Push every 12 hours  enoxaparin Injectable 40 milliGRAM(s) SubCutaneous every 24 hours  levothyroxine 137 MICROGram(s) Oral daily  methylPREDNISolone sodium succinate Injectable 40 milliGRAM(s) IV Push daily  midodrine 10 milliGRAM(s) Oral every 8 hours  polyethylene glycol 3350 17 Gram(s) Oral daily  senna 2 Tablet(s) Oral at bedtime    MEDICATIONS  (PRN):  acetaminophen     Tablet .. 650 milliGRAM(s) Oral every 6 hours PRN Temp greater or equal to 38C (100.4F), Mild Pain (1 - 3)  aluminum hydroxide/magnesium hydroxide/simethicone Suspension 30 milliLiter(s) Oral every 4 hours PRN Dyspepsia  ondansetron Injectable 4 milliGRAM(s) IV Push every 8 hours PRN Nausea and/or Vomiting      ROS:  General:  No fevers, chills, or unexplained weight loss  Skin: No rash or bothersome skin lesions  Musculoskeletal: No arthalgias, myalgias or joint swelling  Eyes: No visual changes or eye pain  Ears: No hearing loss , otorrhea or ear pain  Nose, Mouth, Throat: No nasal congestion, rhinorrhea, oral lesions, postnasal drip or sore throat  Cardio: No chest pain or palpitations. no lower extremity edema. no syncope. no claudication.   Respiratory: No cough, shortness of breath or wheezing   GI: No diarrhea, constipation, blood in stools, abdominal pain, vomiting or heartburn  : No urinary frequency, hematuria, incontinence, or dysuria  Neurologic: No headaches, parasthesias, confusion, dysarthria or gait instability  Psychiatric:  No anxiety or depression  Lymphatic:  No easy bruising, easy bleeding or swollen glands  Allergic: No itching, sneezing , watery eyes, clear rhinorrhea or recurrent infections    PEx  T(C): 36.4 (25 @ 08:00), Max: 37.7 (25 @ 13:53)  HR: 80 (25 @ 12:00) (64 - 80)  BP: 93/72 (25 @ 12:00) (79/55 - 113/65)  RR: 14 (25 @ 12:00) (12 - 20)  SpO2: 95% (25 @ 12:00) (95% - 100%)  Wt(kg): --  General:     no acute distress, no identifying marks , scars, or tattoos.  Skin: no rash or prominent lesions  Head: normocephalic, atraumatic     Sinuses: non-tender  Nose: no external lesions, mucosa non-inflamed, septum and turbinates normal  Throat: no erythema, exudates or lesions.  Neck: Supple without lymphadenopathy. Thyroid no thyromegaly, no palpable thyroid nodules, no palpable nodules or masses, carotid arteries no bruits.   Breasts: No palpable masses or lesions.  Heart: RRR, no murmur or gallop.  Normal S1, S2.  No S3, S4.   Lungs: CTA bilaterally, no wheezes, rhonchi, rales.  Breathing unlabored.   Chest wall: Normal insp   Abdomen:  Soft, NT/ND, normal bowel sounds, no HSM, no masses.  No peritoneal signs.   Back: spine normal without deformity or tenderness.  Normal ROM   : Exam normal.  no inguinal hernias.  Extremities: No deformities, clubbing, cyanosis, or edema.  Musculoskeletal: Normal gait and station. No decreased range of motion, instability, atrophy or abnormal strength or tone in the head, neck, spine, ribs, pelvis or extremities.   Neurologic: CN 2-12 normal. Sensation to pain, touch and proprioception normal. DTRs normal in upper and lower extremities. No pathologic reflexes.  Motor normal.  Psychiatric: Oriented X3, intact recent and remote memory, judgement and insight, normal mood and affect.                          8.8    16.78 )-----------( 271      ( 2025 03:37 )             27.8     07    136  |  110[H]  |  19  ----------------------------<  125[H]  4.8   |  22  |  0.64    Ca    7.5[L]      2025 03:37  Mg     2.4     -    TPro  6.6  /  Alb  2.6[L]  /  TBili  0.5  /  DBili  x   /  AST  5[L]  /  ALT  7[L]  /  AlkPhos  68  07-    CAPILLARY BLOOD GLUCOSE        PT/INR - ( 2025 03:37 )   PT: 13.1 sec;   INR: 1.14 ratio         PTT - ( 2025 03:37 )  PTT:28.7 sec  Urinalysis Basic - ( 2025 03:37 )    Color: x / Appearance: x / SG: x / pH: x  Gluc: 125 mg/dL / Ketone: x  / Bili: x / Urobili: x   Blood: x / Protein: x / Nitrite: x   Leuk Esterase: x / RBC: x / WBC x   Sq Epi: x / Non Sq Epi: x / Bacteria: x          Radiology/Imaging, I have personally reviewed:    Assessment and Plan:       Patient is a 74y old  Female who presents with a chief complaint of Fever, weakness (2025 08:49)    HPI:  73 yo F with pmhx of TAVR, Non small cell lung cancer, mets to the adrenal glands s/p RT, NATHALY, Hypothyroidism presenting with weakness and 3 day hx of decrease po intake and fevers. In the ED pt's T max 101 started on decadron and midodrine for low bp and admitted to the ICU for further management. Patient diagnosed with sepsis but unclear source of infection cxr and ua were negative. Patient did have tap done to swollen knee which showed pseudogout. Patient also believed to have adrenal insufficiency started on steroids for further management. Patient stable and downgraded the following day.       PAST MEDICAL & SURGICAL HISTORY:  Hypothyroidism      Osteoarthritis  left knee arthroscopy       Eczema      Aortic stenosis      HTN (hypertension)      History of Appendectomy       Section   d/t failure to progress and Repeat       Parotid Mass Removed       S/P left Bunionectomy       Abdominal cyst removed       Gastric Bypass  for Obesity       Vocal Cord Polyp Removed   Denies current vocal cord polyps      H/O total knee replacement, left      History of cataract surgery      History of transcatheter aortic valve replacement (TAVR)      S/P aortic valve replacement        FAMILY HISTORY:  No pertinent family history in first degree relatives      Social History:  no alcohol or current smoking     Allergies    No Known Allergies    Intolerances        MEDICATIONS  (STANDING):  cefepime  Injectable. 1000 milliGRAM(s) IV Push every 12 hours  enoxaparin Injectable 40 milliGRAM(s) SubCutaneous every 24 hours  levothyroxine 137 MICROGram(s) Oral daily  methylPREDNISolone sodium succinate Injectable 40 milliGRAM(s) IV Push daily  midodrine 10 milliGRAM(s) Oral every 8 hours  polyethylene glycol 3350 17 Gram(s) Oral daily  senna 2 Tablet(s) Oral at bedtime    MEDICATIONS  (PRN):  acetaminophen     Tablet .. 650 milliGRAM(s) Oral every 6 hours PRN Temp greater or equal to 38C (100.4F), Mild Pain (1 - 3)  aluminum hydroxide/magnesium hydroxide/simethicone Suspension 30 milliLiter(s) Oral every 4 hours PRN Dyspepsia  ondansetron Injectable 4 milliGRAM(s) IV Push every 8 hours PRN Nausea and/or Vomiting      ROS:  General:  No fevers, chills, or unexplained weight loss  Skin: No rash or bothersome skin lesions  Musculoskeletal: No arthalgias, myalgias or joint swelling  Eyes: No visual changes or eye pain  Ears: No hearing loss , otorrhea or ear pain  Nose, Mouth, Throat: No nasal congestion, rhinorrhea, oral lesions, postnasal drip or sore throat  Cardio: No chest pain or palpitations. no lower extremity edema. no syncope. no claudication.   Respiratory: No cough, shortness of breath or wheezing   GI: No diarrhea, constipation, blood in stools, abdominal pain, vomiting or heartburn  : No urinary frequency, hematuria, incontinence, or dysuria  Neurologic: No headaches, parasthesias, confusion, dysarthria or gait instability  Psychiatric:  No anxiety or depression  Lymphatic:  No easy bruising, easy bleeding or swollen glands  Allergic: No itching, sneezing , watery eyes, clear rhinorrhea or recurrent infections    PEx  T(C): 36.4 (25 @ 08:00), Max: 37.7 (25 @ 13:53)  HR: 80 (25 @ 12:00) (64 - 80)  BP: 93/72 (25 @ 12:00) (79/55 - 113/65)  RR: 14 (25 @ 12:00) (12 - 20)  SpO2: 95% (25 @ 12:00) (95% - 100%)  Wt(kg): --  General:     no acute distress, no identifying marks , scars, or tattoos.  Skin: no rash or prominent lesions  Head: normocephalic, atraumatic     Sinuses: non-tender  Nose: no external lesions, mucosa non-inflamed, septum and turbinates normal  Throat: no erythema, exudates or lesions.  Neck: Supple without lymphadenopathy. Thyroid no thyromegaly, no palpable thyroid nodules, no palpable nodules or masses, carotid arteries no bruits.   Breasts: No palpable masses or lesions.  Heart: RRR, no murmur or gallop.  Normal S1, S2.  No S3, S4.   Lungs: CTA bilaterally, no wheezes, rhonchi, rales.  Breathing unlabored.   Chest wall: Normal insp   Abdomen:  Soft, NT/ND, normal bowel sounds, no HSM, no masses.  No peritoneal signs.   Back: spine normal without deformity or tenderness.  Normal ROM   : Exam normal.  no inguinal hernias.  Extremities: No deformities, clubbing, cyanosis, or edema.  Musculoskeletal: Normal gait and station. No decreased range of motion, instability, atrophy or abnormal strength or tone in the head, neck, spine, ribs, pelvis or extremities.   Neurologic: CN 2-12 normal. Sensation to pain, touch and proprioception normal. DTRs normal in upper and lower extremities. No pathologic reflexes.  Motor normal.  Psychiatric: Oriented X3, intact recent and remote memory, judgement and insight, normal mood and affect.                          8.8    16.78 )-----------( 271      ( 2025 03:37 )             27.8         136  |  110[H]  |  19  ----------------------------<  125[H]  4.8   |  22  |  0.64    Ca    7.5[L]      2025 03:37  Mg     2.4     -    TPro  6.6  /  Alb  2.6[L]  /  TBili  0.5  /  DBili  x   /  AST  5[L]  /  ALT  7[L]  /  AlkPhos  68  07-06    CAPILLARY BLOOD GLUCOSE        PT/INR - ( 2025 03:37 )   PT: 13.1 sec;   INR: 1.14 ratio         PTT - ( 2025 03:37 )  PTT:28.7 sec  Urinalysis Basic - ( 2025 03:37 )    Color: x / Appearance: x / SG: x / pH: x  Gluc: 125 mg/dL / Ketone: x  / Bili: x / Urobili: x   Blood: x / Protein: x / Nitrite: x   Leuk Esterase: x / RBC: x / WBC x   Sq Epi: x / Non Sq Epi: x / Bacteria: x          Radiology/Imaging, I have personally reviewed:    Assessment and Plan:

## 2025-07-07 NOTE — PROGRESS NOTE ADULT - ASSESSMENT
75 yo F with pmhx of TAVR, Non small cell lung cancer, mets to the adranl glands s/p RT, NATHALY, Hypothryodisim presenting with weakness and 3 day hx of decrease po intake and fevers. In the ED pt's T max 101 started on decadron and midodrine for low bp and admitted to the ICU for further management. Patient diagnosed with sepsis but unclear source of infection cxr and ua were negative. Patient did have tap done to swollen knee which showed pseudogout. Patient also believed to have adrenal insufficiency started on steroids for further management.     #Sepsis   - trend fever, wbc curve   - midodrine 10 mg q8 hrs, wean as able   - cw empiric cefepim   - f/u blood cultures and urine culture   - ID consulted, appreciate recs   - hold home antihypertensives     #Adrenal insufficiency?  - Switch stress dose steroid to solumedrol 40mg IV qD for total 5 day course then can switch to home steroid dosing, glucose stable    #Pseudogout   -Knee aspirated - sent for CC/Crystals/GS/Cx/PCR - CC: 26k, 95% PMNs, Crystals + for CPPD - Likely pseudogout  -FU GS/Cx/PCR  -  -ESR 83  -WBAT RLE, ACE wrap PRN  - ortho consulted, appreciate recs     #Anemia   - s/p 1 u prbc 7/6   - no source of bleeding   - ok to resume lovenox dvt ppx toda y  - trend h&h daily   - maintain active type and screen     #Hypothyrodism   - cw home synthroid     #HFpEF   EF this admission 50-55% grade 1 diastolic dysfunction   - hold home losartan 12.5 mg daily   - hold home metoprolol succinate 25 mg daily   - hold home bumetanide 1 mg daily  75 yo F with pmhx of TAVR, Non small cell lung cancer, mets to the adrenal glands s/p RT, NATHALY, Hypothyroidism presenting with weakness and 3 day hx of decrease po intake and fevers. In the ED pt's T max 101 started on decadron and midodrine for low bp and admitted to the ICU for further management. Patient diagnosed with sepsis but unclear source of infection cxr and ua were negative. Patient did have tap done to swollen knee which showed pseudogout. Patient also believed to have adrenal insufficiency started on steroids for further management. Patient stable and downgraded the following day.     #Sepsis   - trend fever, wbc curve   - midodrine 10 mg q8 hrs, wean as able   - cw empiric cefepime  - f/u blood cultures and urine culture   - hold home antihypertensives     #Adrenal insufficiency?  - Switch stress dose steroid to solumedrol 40mg IV qD for total 5 day course then can switch to home steroid dosing, glucose stable    #Pseudogout   -Knee aspirated - sent for CC/Crystals/GS/Cx/PCR - CC: 26k, 95% PMNs, Crystals + for CPPD - Likely pseudogout  -FU GS/Cx/PCR  -  -ESR 83  -WBAT RLE, ACE wrap PRN  - ortho consulted, appreciate recs     #Anemia   - s/p 1 u prbc 7/6   - no source of bleeding   - ok to resume lovenox dvt ppx today  - trend h&h daily   - maintain active type and screen     Hypothyroidism   - cw home synthroid     #HFpEF   EF this admission 50-55% grade 1 diastolic dysfunction   - hold home losartan 12.5 mg daily   - hold home metoprolol succinate 25 mg daily   - hold home bumetanide 1 mg daily

## 2025-07-07 NOTE — PROGRESS NOTE ADULT - ASSESSMENT
75 y/o F PMH AS s/p TAVR, HTN, hypothyroid, lung CA metastatic to adrenal glands was on chemo now just RT who presented with 3 days of generalized weakness, poor PO intake, and fevers, also w/ R knee pain and swelling, in ED found to be febrile and hypotensive c/f septic shock, given empiric IVF and abx, never started on pressors but admitted to ICU for close hemodynamic monitoring.     Pt also seen by ortho, s/p R knee arthrocentesis 7/6 showing pseudogout, no organisms.     Problems (POA):  #Septic shock  #Acute pseudogout  #R/o acute UTI 75 y/o F PMH AS s/p TAVR, HTN, hypothyroid, lung CA metastatic to adrenal glands was on chemo now just RT who presented with 3 days of generalized weakness, poor PO intake, and fevers, also w/ R knee pain and swelling, in ED found to be febrile and hypotensive c/f septic shock, given empiric IVF and abx, never started on pressors but admitted to ICU for close hemodynamic monitoring.     Pt also seen by ortho, s/p R knee arthrocentesis 7/6 showing pseudogout, no organisms.     Problems (POA):  #Septic shock  #R knee pseudogout  #R/o acute UTI  #Anemia  #Metastatic NSCL CA    Plan:  - Mentation intact, pain control prn  - BP soft but stable, no pressors required, pt reports baseline low BPs, c/w midodrine 10mg q8h, TTE LVEF 50-55%/grade I DD, holding home BP med/diuretic in setting of sepsis for now will restart as tolerated, wean steroid  - On room air  - Regular diet, bowel regimen  - Cr stable, cont to monitor renal indices/UOP, hold further IVF encourage PO intake  - C/w empiric cefepime, R knee culture/PCR negative, f/u blood and urine cultures and flu/covid PCR  - Switch stress dose steroid to solumedrol 40mg IV qD for total 5 day course then can switch to home steroid dosing, glucose stable  - Hgb 7.9 => 8.8 after 1u pRBC yesterday, no bleeding noted, will start lovenox dvt ppx today    Dispo: Pt medically stable for downgrade to 1N, d/w Dr. Caldwell

## 2025-07-07 NOTE — PROGRESS NOTE ADULT - SUBJECTIVE AND OBJECTIVE BOX
OVERNIGHT EVENTS / SUBJECTIVE: Patient seen and examined at bedside.     Pt reports feeling significantly improved this AM. Hemodynamically stable never started on pressors. WBC uptrend but on steroid. Afebrile.     OBJECTIVE:    VITAL SIGNS:  ICU Vital Signs Last 24 Hrs  T(C): 36.4 (07 Jul 2025 08:00), Max: 37.7 (06 Jul 2025 13:53)  T(F): 97.6 (07 Jul 2025 08:00), Max: 99.9 (06 Jul 2025 13:53)  HR: 72 (07 Jul 2025 10:00) (64 - 88)  BP: 94/65 (07 Jul 2025 10:00) (79/55 - 113/65)  BP(mean): 75 (07 Jul 2025 10:00) (64 - 94)  ABP: --  ABP(mean): --  RR: 17 (07 Jul 2025 10:00) (12 - 20)  SpO2: 97% (07 Jul 2025 10:00) (97% - 100%)    O2 Parameters below as of 07 Jul 2025 08:00  Patient On (Oxygen Delivery Method): room air              07-06 @ 07:01  -  07-07 @ 07:00  --------------------------------------------------------  IN: 337 mL / OUT: 350 mL / NET: -13 mL    07-07 @ 07:01  -  07-07 @ 12:14  --------------------------------------------------------  IN: 210 mL / OUT: 0 mL / NET: 210 mL      CAPILLARY BLOOD GLUCOSE          PHYSICAL EXAM:    General: NAD  HEENT: NC/AT; PERRL, clear conjunctiva  Neck: supple  Respiratory: CTA b/l  Cardiovascular: +S1/S2; RRR  Abdomen: soft, NT/ND  Extremities: R knee in ace bandage, nontender, 2+ peripheral pulses b/l; no LE edema  Skin: normal color and turgor; no rash  Neurological: A&Ox3, no focal deficit    MEDICATIONS:  MEDICATIONS  (STANDING):  cefepime  Injectable. 1000 milliGRAM(s) IV Push every 12 hours  enoxaparin Injectable 40 milliGRAM(s) SubCutaneous every 24 hours  levothyroxine 137 MICROGram(s) Oral daily  methylPREDNISolone sodium succinate Injectable 40 milliGRAM(s) IV Push daily  midodrine 10 milliGRAM(s) Oral every 8 hours  polyethylene glycol 3350 17 Gram(s) Oral daily  senna 2 Tablet(s) Oral at bedtime    MEDICATIONS  (PRN):  acetaminophen     Tablet .. 650 milliGRAM(s) Oral every 6 hours PRN Temp greater or equal to 38C (100.4F), Mild Pain (1 - 3)  aluminum hydroxide/magnesium hydroxide/simethicone Suspension 30 milliLiter(s) Oral every 4 hours PRN Dyspepsia  ondansetron Injectable 4 milliGRAM(s) IV Push every 8 hours PRN Nausea and/or Vomiting      ALLERGIES:  Allergies    No Known Allergies    Intolerances        LABS:                        8.8    16.78 )-----------( 271      ( 07 Jul 2025 03:37 )             27.8     Hemoglobin: 8.8 g/dL (07-07 @ 03:37)  Hemoglobin: 7.9 g/dL (07-06 @ 10:05)    CBC Full  -  ( 07 Jul 2025 03:37 )  WBC Count : 16.78 K/uL  RBC Count : 3.29 M/uL  Hemoglobin : 8.8 g/dL  Hematocrit : 27.8 %  Platelet Count - Automated : 271 K/uL  Mean Cell Volume : 84.5 fl  Mean Cell Hemoglobin : 26.7 pg  Mean Cell Hemoglobin Concentration : 31.7 g/dL  Auto Neutrophil # : 15.46 K/uL  Auto Lymphocyte # : 0.27 K/uL  Auto Monocyte # : 0.63 K/uL  Auto Eosinophil # : 0.03 K/uL  Auto Basophil # : 0.10 K/uL  Auto Neutrophil % : 92.1 %  Auto Lymphocyte % : 1.6 %  Auto Monocyte % : 3.8 %  Auto Eosinophil % : 0.2 %  Auto Basophil % : 0.6 %    07-07    136  |  110[H]  |  19  ----------------------------<  125[H]  4.8   |  22  |  0.64    Ca    7.5[L]      07 Jul 2025 03:37  Mg     2.4     07-07    TPro  6.6  /  Alb  2.6[L]  /  TBili  0.5  /  DBili  x   /  AST  5[L]  /  ALT  7[L]  /  AlkPhos  68  07-06    Creatinine Trend: 0.64<--, 0.92<--  LIVER FUNCTIONS - ( 06 Jul 2025 10:05 )  Alb: 2.6 g/dL / Pro: 6.6 gm/dL / ALK PHOS: 68 U/L / ALT: 7 U/L / AST: 5 U/L / GGT: x           PT/INR - ( 07 Jul 2025 03:37 )   PT: 13.1 sec;   INR: 1.14 ratio         PTT - ( 07 Jul 2025 03:37 )  PTT:28.7 sec    hs Troponin:            Urinalysis Basic - ( 07 Jul 2025 03:37 )    Color: x / Appearance: x / SG: x / pH: x  Gluc: 125 mg/dL / Ketone: x  / Bili: x / Urobili: x   Blood: x / Protein: x / Nitrite: x   Leuk Esterase: x / RBC: x / WBC x   Sq Epi: x / Non Sq Epi: x / Bacteria: x      CSF:                      EKG:   MICROBIOLOGY:    Culture - Joint (collected 06 Jul 2025 20:30)  Source: Knee Knee  Gram Stain (07 Jul 2025 02:37):    polymorphonuclear leukocytes seen    No organisms seen    by cytocentrifuge    Urinalysis with Rflx Culture (collected 06 Jul 2025 13:56)      IMAGING:      Labs, imaging, EKG personally reviewed    RADIOLOGY & ADDITIONAL TESTS: Reviewed.

## 2025-07-07 NOTE — DIETITIAN INITIAL EVALUATION ADULT - PERTINENT MEDS FT
MEDICATIONS  (STANDING):  cefepime  Injectable. 1000 milliGRAM(s) IV Push every 12 hours  enoxaparin Injectable 40 milliGRAM(s) SubCutaneous every 24 hours  hydrocortisone sodium succinate Injectable 50 milliGRAM(s) IV Push every 6 hours  midodrine 10 milliGRAM(s) Oral every 8 hours    Home Medications:  acetaminophen 500 mg oral tablet: 2 tab(s) orally once a day (at bedtime) (06 Jul 2025 15:47)  bumetanide 1 mg oral tablet: 1 tab(s) orally once a day AM (06 Jul 2025 15:31)  dexAMETHasone 2 mg oral tablet: 1 tab(s) orally 2 times a day (06 Jul 2025 15:47)  Farxiga 5 mg oral tablet: 1 tab(s) orally once a day (06 Jul 2025 15:53)  finasteride 5 mg oral tablet: 1 tab(s) orally once a day AM (06 Jul 2025 15:27)  levothyroxine 137 mcg (0.137 mg) oral tablet: 1 tab(s) orally (06 Jul 2025 15:52)  LORazepam 0.5 mg oral tablet: orally (06 Jul 2025 15:47)  losartan 25 mg oral tablet: 0.5 tab(s) orally once a day Lunchtime (06 Jul 2025 15:31)  metoprolol succinate 25 mg oral tablet, extended release: 1 tab(s) orally once a day Lunch time (06 Jul 2025 15:31)  oxyCODONE 5 mg oral tablet: 1 tab(s) orally *** Take 1 to 2 tabs 4 times a day*** (06 Jul 2025 15:47)  Senna 8.6 mg oral tablet: 2 tab(s) orally 2 times a day (06 Jul 2025 15:47)  spironolactone 25 mg oral tablet: 0.5 tab(s) orally once a day AM (06 Jul 2025 15:31)  Tylenol 325 mg oral tablet: 2 tab(s) orally every 6 hours as needed for  mild pain (06 Jul 2025 15:31)

## 2025-07-07 NOTE — DIETITIAN INITIAL EVALUATION ADULT - PERTINENT LABORATORY DATA
07-07    136  |  110[H]  |  19  ----------------------------<  125[H]  4.8   |  22  |  0.64    Ca    7.5[L]      07 Jul 2025 03:37  Mg     2.4     07-07    TPro  6.6  /  Alb  2.6[L]  /  TBili  0.5  /  DBili  x   /  AST  5[L]  /  ALT  7[L]  /  AlkPhos  68  07-06

## 2025-07-07 NOTE — CHART NOTE - NSCHARTNOTEFT_GEN_A_CORE
Joint PCR resulted - negative    ASSESSMENT & PLAN  74yFemale w/ R knee pain, XR R knee showing severe OA. Patients pain has improved since getting steroids. Was given vanco and cefepime.     -Knee aspirated - sent for CC/Crystals/GS/Cx/PCR - CC: 26k, 95% PMNs, Crystals + for CPPD, PCR negative - Likely pseudogout  -FU GS/Cx  -  -ESR 83  -WBAT RLE, ACE wrap PRN  -f/u BCx  -pain control w/ NSAIDs  -DVT ppx; per primary  -ice/cold compress, elevation  -No acute orthopaedic surgical intervention indicated at this time.  -Patient to follow up with Dr. Gonzalez as an outpatient for further evaluation and management.

## 2025-07-07 NOTE — DIETITIAN INITIAL EVALUATION ADULT - OTHER INFO
75 y/o female with a PMHx of lung CA on RT (last treatment Thursday), severe aortic stenosis s/p TAVR, OA, gastric bypass 2004, and hypothyroidism who presented to the ED BIBEMS for generalized weakness, fevers, and poor PO intake for the last two weeks. For the last 2 to 3 days, the patient has been unable to eat. Today, the patient was unable to walk due to weakness which prompted an EMS call. Endorses nausea. Admitted for sepsis, septic shock, NSCLCA, fever, possible adrenal insufficiency, R knee swelling, gout vs. infected Joint; admitted to ICU. S/p knee aspiration (7/6) - likely pseudogout.    Unable to obtain meaningful information 2/2 pt sleeping and unarousable; NPO at time of RD visit. RN obtained bedscale wt on 7/7 - 127#; 3+ knee may be skewing weight. Weight hx reviewed: 149.9# (dosing weight from 1/2/24); weight loss of 22.9# (15.3%) x 17 mon - not clinsig. Pt very thin, frail, ketty appearing. NFPE reveals moderate to severe muscle/ fat wasting, pt meets criteria for PCM at this time. Diet now advanced to regular. Will add HH made high kcal/ high prot mandi mousse BID in effort to optimize PO intake. Consider adding appetite stimulant such as Remeron or Marinol 2/2 chronically poor appetite/ PO intake. Please see additional recommendations below.  73 y/o female with a PMHx of lung CA on RT (last treatment Thursday), severe aortic stenosis s/p TAVR, OA, gastric bypass 2004, and hypothyroidism who presented to the ED BIBEMS for generalized weakness, fevers, and poor PO intake for the last two weeks. For the last 2 to 3 days, the patient has been unable to eat. Today, the patient was unable to walk due to weakness which prompted an EMS call. Endorses nausea. Admitted for sepsis, septic shock, NSCLCA, fever, possible adrenal insufficiency, R knee swelling, gout vs. infected Joint; admitted to ICU. S/p knee aspiration (7/6) - likely pseudogout.    Unable to obtain meaningful information 2/2 pt sleeping and unarousable; NPO at time of RD visit. RN obtained bedscale wt on 7/7 - 127#; 3+ knee may be skewing weight. Weight hx reviewed: 149.9# (dosing weight from 1/2/24); weight loss of 22.9# (15.3%) x 17 mon - not clinsig. Pt very thin, frail, ketty appearing. NFPE reveals severe muscle/ fat wasting, pt meets criteria for PCM at this time. Diet now advanced to regular. Will add HH made high kcal/ high prot mandi mousse BID in effort to optimize PO intake. Consider adding appetite stimulant such as Remeron or Marinol 2/2 chronically poor appetite/ PO intake. Please see additional recommendations below.

## 2025-07-07 NOTE — DIETITIAN INITIAL EVALUATION ADULT - ETIOLOGY
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

## 2025-07-07 NOTE — DIETITIAN INITIAL EVALUATION ADULT - ADD RECOMMEND
1) C/w Regular diet  2) Add igh-protein mousse BID (provides 394 kcal, 14g protein/ container) to optimize PO intake  3) consider checking B6, B12, thiamine, folate, carnitine, and copper levels as malnutrition in cause these to be deficient  4) Please obtain daily weights  5) Consider adding thiamine 100 mg daily 2/2 poor PO intake/ malnutrition  6) MVI w/ minerals daily to ensure 100% RDA met  7) Encourage protein-rich foods, maximize food preferences  8) Monitor bowel movements, if no BM for >3 days, consider implementing bowel regimen.  9) Consider adding appetite stimulant such as Remeron or Marinol 2/2 chronically poor appetite/ PO intake  10) Confirm goals of care regarding nutrition support  Nutrition recommendations to continue upon discharge. Goal to continue to meet >/= 80% ENN via tolerated route. RD to F/U prn for changes to nutrition dc plan.  RD will continue to monitor PO intake, labs, hydration, and wt prn.  1) C/w Regular diet  2) Add high-protein mousse BID (provides 394 kcal, 14g protein/ container) to optimize PO intake  3) consider checking B6, B12, thiamine, folate, carnitine, and copper levels as malnutrition in cause these to be deficient  4) Please obtain daily weights  5) Consider adding thiamine 100 mg daily 2/2 poor PO intake/ malnutrition  6) MVI w/ minerals daily to ensure 100% RDA met  7) Encourage protein-rich foods, maximize food preferences  8) Monitor bowel movements, if no BM for >3 days, consider implementing bowel regimen.  9) Consider adding appetite stimulant such as Remeron or Marinol 2/2 chronically poor appetite/ PO intake  10) Confirm goals of care regarding nutrition support  Nutrition recommendations to continue upon discharge. Goal to continue to meet >/= 80% ENN via tolerated route. RD to F/U prn for changes to nutrition dc plan.  RD will continue to monitor PO intake, labs, hydration, and wt prn.

## 2025-07-08 LAB
ADD ON TEST-SPECIMEN IN LAB: SIGNIFICANT CHANGE UP
ANION GAP SERPL CALC-SCNC: 7 MMOL/L — SIGNIFICANT CHANGE UP (ref 5–17)
BUN SERPL-MCNC: 27 MG/DL — HIGH (ref 7–23)
CALCIUM SERPL-MCNC: 7.7 MG/DL — LOW (ref 8.5–10.1)
CHLORIDE SERPL-SCNC: 108 MMOL/L — SIGNIFICANT CHANGE UP (ref 96–108)
CO2 SERPL-SCNC: 19 MMOL/L — LOW (ref 22–31)
CREAT SERPL-MCNC: 0.85 MG/DL — SIGNIFICANT CHANGE UP (ref 0.5–1.3)
EGFR: 71 ML/MIN/1.73M2 — SIGNIFICANT CHANGE UP
EGFR: 71 ML/MIN/1.73M2 — SIGNIFICANT CHANGE UP
GLUCOSE SERPL-MCNC: 129 MG/DL — HIGH (ref 70–99)
HCT VFR BLD CALC: 32.2 % — LOW (ref 34.5–45)
HGB BLD-MCNC: 10.1 G/DL — LOW (ref 11.5–15.5)
MAGNESIUM SERPL-MCNC: 2.8 MG/DL — HIGH (ref 1.6–2.6)
MCHC RBC-ENTMCNC: 26.6 PG — LOW (ref 27–34)
MCHC RBC-ENTMCNC: 31.4 G/DL — LOW (ref 32–36)
MCV RBC AUTO: 84.7 FL — SIGNIFICANT CHANGE UP (ref 80–100)
NRBC # BLD AUTO: 0 K/UL — SIGNIFICANT CHANGE UP (ref 0–0)
NRBC # FLD: 0 K/UL — SIGNIFICANT CHANGE UP (ref 0–0)
NRBC BLD AUTO-RTO: 0 /100 WBCS — SIGNIFICANT CHANGE UP (ref 0–0)
NT-PROBNP SERPL-SCNC: 2456 PG/ML — HIGH (ref 0–450)
PHOSPHATE SERPL-MCNC: 3.2 MG/DL — SIGNIFICANT CHANGE UP (ref 2.5–4.5)
PLATELET # BLD AUTO: 317 K/UL — SIGNIFICANT CHANGE UP (ref 150–400)
PMV BLD: 10 FL — SIGNIFICANT CHANGE UP (ref 7–13)
POTASSIUM SERPL-MCNC: 5.2 MMOL/L — SIGNIFICANT CHANGE UP (ref 3.5–5.3)
POTASSIUM SERPL-SCNC: 5.2 MMOL/L — SIGNIFICANT CHANGE UP (ref 3.5–5.3)
RBC # BLD: 3.8 M/UL — SIGNIFICANT CHANGE UP (ref 3.8–5.2)
RBC # FLD: 16.2 % — HIGH (ref 10.3–14.5)
SODIUM SERPL-SCNC: 134 MMOL/L — LOW (ref 135–145)
WBC # BLD: 19.95 K/UL — HIGH (ref 3.8–10.5)
WBC # FLD AUTO: 19.95 K/UL — HIGH (ref 3.8–10.5)

## 2025-07-08 PROCEDURE — 99233 SBSQ HOSP IP/OBS HIGH 50: CPT

## 2025-07-08 RX ORDER — MAGNESIUM HYDROXIDE 400 MG/5ML
30 SUSPENSION ORAL DAILY
Refills: 0 | Status: DISCONTINUED | OUTPATIENT
Start: 2025-07-08 | End: 2025-07-10

## 2025-07-08 RX ORDER — BISACODYL 5 MG
10 TABLET, DELAYED RELEASE (ENTERIC COATED) ORAL DAILY
Refills: 0 | Status: DISCONTINUED | OUTPATIENT
Start: 2025-07-08 | End: 2025-07-10

## 2025-07-08 RX ADMIN — Medication 55 MILLILITER(S): at 22:35

## 2025-07-08 RX ADMIN — Medication 1 MILLIGRAM(S): at 22:33

## 2025-07-08 RX ADMIN — Medication 1 MILLIGRAM(S): at 23:03

## 2025-07-08 RX ADMIN — MAGNESIUM HYDROXIDE 30 MILLILITER(S): 400 SUSPENSION ORAL at 11:41

## 2025-07-08 RX ADMIN — POLYETHYLENE GLYCOL 3350 17 GRAM(S): 17 POWDER, FOR SOLUTION ORAL at 09:28

## 2025-07-08 RX ADMIN — Medication 10 MILLIGRAM(S): at 11:42

## 2025-07-08 RX ADMIN — MIDODRINE HYDROCHLORIDE 10 MILLIGRAM(S): 5 TABLET ORAL at 15:10

## 2025-07-08 RX ADMIN — Medication 4 MILLIGRAM(S): at 11:03

## 2025-07-08 RX ADMIN — Medication 137 MICROGRAM(S): at 05:29

## 2025-07-08 RX ADMIN — Medication 650 MILLIGRAM(S): at 07:57

## 2025-07-08 RX ADMIN — ENOXAPARIN SODIUM 40 MILLIGRAM(S): 100 INJECTION SUBCUTANEOUS at 09:33

## 2025-07-08 RX ADMIN — Medication 4 MILLIGRAM(S): at 21:06

## 2025-07-08 RX ADMIN — METHYLPREDNISOLONE ACETATE 40 MILLIGRAM(S): 80 INJECTION, SUSPENSION INTRA-ARTICULAR; INTRALESIONAL; INTRAMUSCULAR; SOFT TISSUE at 09:27

## 2025-07-08 RX ADMIN — CEFEPIME 1000 MILLIGRAM(S): 2 INJECTION, POWDER, FOR SOLUTION INTRAVENOUS at 09:27

## 2025-07-08 NOTE — PHYSICAL THERAPY INITIAL EVALUATION ADULT - ADDITIONAL COMMENTS
Pt reports being independent in PLOF and denied using any AD. Pt does own a RW, has 6 DYLAN (x2). Spouse is able to assist per pt with activities if needed.

## 2025-07-08 NOTE — PHYSICAL THERAPY INITIAL EVALUATION ADULT - PERTINENT HX OF CURRENT PROBLEM, REHAB EVAL
75 yo F with pmhx of TAVR, Non small cell lung cancer, mets to the adrenal glands s/p RT, NATHALY, Hypothyroidism presenting with weakness and 3 day hx of decrease po intake and fevers. Patient with hx. of metastatic lung cancer, to adrenal glands. presents with fever, possible sepsis

## 2025-07-08 NOTE — PROGRESS NOTE ADULT - ASSESSMENT
75 yo F with pmhx of TAVR, Non small cell lung cancer, mets to the adrenal glands s/p RT, NATHALY, Hypothyroidism presenting with weakness and 3 day hx of decrease po intake and fevers. In the ED pt's T max 101 started on decadron and midodrine for low bp and admitted to the ICU for further management. Patient diagnosed with sepsis but unclear source of infection cxr and ua were negative. Patient did have tap done to swollen knee which showed pseudogout. Patient also believed to have adrenal insufficiency started on steroids for further management. Patient stable and downgraded the following day.     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 Gonzalez as an outpatient for further evaluation and management.    HFpEF    EF this admission 50-55% grade 1 diastolic dysfunction   - hold home losartan 12.5 mg daily   - hold home metoprolol succinate 25 mg daily   - hold home bumetanide 1 mg daily , spironalactone 25 1/2 tab qd  -  on Farxiga    Anemia   - s/p 1 u prbc 7/6   - no source of bleeding   - ok to resume lovenox dvt ppx today  - trend h&h daily   - maintain active type and screen     Hypothyroidism   - cw home synthroid  137   - check TSH, free T4    Constipation for > 1 week  - miralax, senna  - add duclolax supp with MOM     Generalized weakness   Severe protein-calorie malnutrition and Underweight (BMI < 19).  - PT daily, add supplements  - check orthostatics  -  B12, folate wnl  - check vit D     Dispo - IV fluids, laxatives, supplements , labs in am, d/c planning once stbale in 1-2 days        73 yo F with pmhx of TAVR, Non small cell lung cancer, mets to the adrenal glands s/p RT, NATHALY, Hypothyroidism presenting with weakness and 3 day hx of decrease po intake and fevers. In the ED pt's T max 101 started on decadron and midodrine for low bp and admitted to the ICU for further management. Patient diagnosed with sepsis but unclear source of infection cxr and ua were negative. Patient did have tap done to swollen knee which showed pseudogout. Patient also believed to have adrenal insufficiency started on steroids for further management. Patient stable and downgraded the following day.     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 Gonzalez as an outpatient for further evaluation and management.    HFpEF  , Valvular heart disease AS s/p TAVR   EF this admission 50-55% grade 1 diastolic dysfunction   - hold home losartan 12.5 mg daily   - hold home metoprolol succinate 25 mg daily   - hold home bumetanide 1 mg daily , spironalactone 25 1/2 tab qd  -  on Farxiga    Anemia   - s/p 1 u prbc 7/6   - no source of bleeding   - ok to resume lovenox dvt ppx today  - trend h&h daily   - maintain active type and screen     Hypothyroidism   - cw home synthroid  137   - check TSH, free T4    Constipation for > 1 week  - miralax, senna  - add duclolax supp with MOM     Generalized weakness   Severe protein-calorie malnutrition and Underweight (BMI < 19).  - PT daily, add supplements  - check orthostatics  -  B12, folate wnl  - check vit D     Alopecia   - finasteride     Dispo - IV fluids, laxatives, supplements , labs in am, d/c planning once stable  in 1-2 days

## 2025-07-08 NOTE — PROGRESS NOTE ADULT - SUBJECTIVE AND OBJECTIVE BOX
chief complaint of Fever, weakness      HPI:     73 yo F with pmhx of AS s/p  TAVR, Non small cell lung cancer, mets to the adrenal glands s/p RT, NATHALY, Hypothyroidism, HTN  presenting on 7/6/25  with weakness and 3 day hx of decrease po intake and fevers. In the ED pt's T max 101 started on decadron and midodrine for low bp and admitted to the ICU for further management. Patient diagnosed with sepsis but unclear source of infection cxr and ua were negative. Patient did have tap done to swollen knee which showed pseudogout. Patient also believed to have adrenal insufficiency started on steroids for further management. Patient stable and downgraded the following day.     7/8 - chart reviewed, pt seen and examined, complaint of weakness and nausea, constipated for > 1 week, reports + flatus,  not eating for about a week and experiencing lower back pain. The patient attributes her constipation to previous Oxycontin use for pain management related to her lung cancer, which has spread to the adrenal gland. denies cp, dyspnea, afebrile, plan discussed      Review of system- Rest of the review of system are negative except mentioned in HPI    Vital sings reviewed for last 24 h  T(C): 36.7 (07-08-25 @ 16:30), Max: 36.8 (07-08-25 @ 05:29)  T(F): 98 (07-08-25 @ 16:30), Max: 98.2 (07-08-25 @ 05:29)  HR: 69 (07-08-25 @ 16:30) (62 - 77)  BP: 110/66 (07-08-25 @ 16:30) (99/76 - 118/70)  RR: 18 (07-08-25 @ 16:30) (17 - 23)  SpO2: 99% (07-08-25 @ 16:30) (93% - 100%)      PHYSICAL EXAM:    Constitutional: NAD, awake and alert   HEENT: PERR, EOMI, Normal Hearing, MMM  Neck: Soft and supple, No LAD, No JVD  Respiratory: Breath sounds are clear bilaterally, No wheezing, rales or rhonchi  Cardiovascular: S1 and S2, regular rate and rhythm, no Murmurs, gallops or rubs  Gastrointestinal: Bowel Sounds present, soft, nontender, nondistended, no guarding, no rebound  Extremities: No peripheral edema  Vascular: 2+ peripheral pulses  Neurological: A/O x 3, no focal deficits  Musculoskeletal: 5/5 strength b/l upper and lower extremities  Skin: No rashes  rectal : deferred, not indicated    All labs radiology and other studies reviewed and interpreted :                           10.1 19.95 )-----------( 317      ( 08 Jul 2025 06:53 )             32.2     07-08    134[L]  |  108  |  27[H]  ----------------------------<  129[H]  5.2   |  19[L]  |  0.85    Ca    7.7[L]      08 Jul 2025 06:53  Phos  3.2     07-08  Mg     2.8     07-08    Lactate, Blood: 1.1 mmol/L (07.06.25 @ 10:05)    Culture - Joint (07.06.25 @ 20:30)    Gram Stain:   polymorphonuclear leukocytes seen  No organisms seen  by cytocentrifuge   Specimen Source: Knee Knee   Culture Results:   No growth    Culture - Urine (07.06.25 @ 13:56)    Specimen Source: Urine None   Culture Results:   <10,000 CFU/mL Normal Urogenital Lashanda    Culture - Blood (07.06.25 @ 11:53)    Specimen Source: Blood None   Culture Results:   No growth at 48 Hours    Culture - Blood (07.06.25 @ 10:05)    Specimen Source: Blood None   Culture Results:   No growth at 48 Hours      Radiology/Imaging, I have personally reviewed:   Xray Knee 3 Views, Right (07.06.25 @ 18:30) >  IMPRESSION:  Lateral knee joint compartment degenerative arthritic narrowing ..    < from: Xray Chest 1 View- PORTABLE-Urgent (07.06.25 @ 10:34) >  HEART/VASCULAR: The  heart is mildly enlarged in transverse diameter. No   hilar mass or mediastinal widening.. Status post median sternotomy   prominent aortic arch. .    BONES: The visualized osseous thorax is intact.    IMPRESSION:   No radiographic evidence of active chest disease..    < end of copied text >    MEDICATIONS  (STANDING):  bisacodyl Suppository 10 milliGRAM(s) Rectal daily  enoxaparin Injectable 40 milliGRAM(s) SubCutaneous every 24 hours  levothyroxine 137 MICROGram(s) Oral daily  magnesium hydroxide Suspension 30 milliLiter(s) Oral daily  methylPREDNISolone sodium succinate Injectable 40 milliGRAM(s) IV Push daily  midodrine 10 milliGRAM(s) Oral every 8 hours  polyethylene glycol 3350 17 Gram(s) Oral daily  senna 2 Tablet(s) Oral at bedtime    MEDICATIONS  (PRN):  acetaminophen     Tablet .. 650 milliGRAM(s) Oral every 6 hours PRN Temp greater or equal to 38C (100.4F), Mild Pain (1 - 3)  aluminum hydroxide/magnesium hydroxide/simethicone Suspension 30 milliLiter(s) Oral every 4 hours PRN Dyspepsia  ondansetron Injectable 4 milliGRAM(s) IV Push every 8 hours PRN Nausea and/or Vomiting

## 2025-07-08 NOTE — CONSULT NOTE ADULT - SUBJECTIVE AND OBJECTIVE BOX
Patient is a 74y old  Female who presents with a chief complaint of Fever, weakness (07 Jul 2025 13:21)    HPI:  74 year old female with PMH of Previous TAVR  Non- small Cell Lung CA, mets to adrenal glands,  never had surgery, having RT to adrenal glands NATHALY s/p Gastric Bypass Hypothyroidism The patient received RT recently, presents with weakness and 3 days of poor PO intake and fevers also complaining of right knee pain In ED bp to 70s,  temp to 101 received fluid with MAP around 65 was on decadron. Started on IV abx d/t concern for infection. 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       PMH: as above  PSH: as above    Meds: per reconciliation sheet, noted below  MEDICATIONS  (STANDING):  enoxaparin Injectable 40 milliGRAM(s) SubCutaneous every 24 hours  levothyroxine 137 MICROGram(s) Oral daily  methylPREDNISolone sodium succinate Injectable 40 milliGRAM(s) IV Push daily  midodrine 10 milliGRAM(s) Oral every 8 hours  polyethylene glycol 3350 17 Gram(s) Oral daily  senna 2 Tablet(s) Oral at bedtime    Allergies    No Known Allergies    Intolerances      Social: no smoking, no alcohol, no illegal drugs; no recent travel, no exposure to TB  FAMILY HISTORY:  No pertinent family history in first degree relatives       no history of premature cardiovascular disease in first degree relatives    ROS: + fever, chills, no HA, no dizziness, no sore throat, no blurry vision, no CP, no palpitations, no SOB, no cough, no abdominal pain, no diarrhea, no N/V, no dysuria, no leg pain, no claudication, no rash, no joint aches, no rectal pain or bleeding, no night sweats  + knee pain     All other systems reviewed and are negative    Vital Signs Last 24 Hrs  T(C): 36.3 (08 Jul 2025 08:19), Max: 36.8 (08 Jul 2025 05:29)  T(F): 97.3 (08 Jul 2025 08:19), Max: 98.2 (08 Jul 2025 05:29)  HR: 73 (08 Jul 2025 08:19) (62 - 80)  BP: 106/75 (08 Jul 2025 08:19) (93/72 - 118/70)  BP(mean): 80 (08 Jul 2025 05:29) (74 - 84)  RR: 18 (08 Jul 2025 08:19) (14 - 23)  SpO2: 93% (08 Jul 2025 08:19) (93% - 100%)    Parameters below as of 08 Jul 2025 08:19  Patient On (Oxygen Delivery Method): room air      Daily     Daily     PE:  Constitutional: NAD  HEENT: NC/AT, EOMI, PERRLA, conjunctivae clear; ears and nose atraumatic; pharynx benign  Neck: supple; thyroid not palpable  Back: no tenderness  Respiratory: respiratory effort normal; clear to auscultation  Cardiovascular: S1S2 regular, no murmurs  Abdomen: soft, not tender, not distended, positive BS; liver and spleen WNL  Genitourinary: no suprapubic tenderness  Lymphatic: no LN palpable   Musculoskeletal: no muscle tenderness, no joint swelling or tenderness  Extremities: no pedal edema  Neurological/ Psychiatric: AxOx3, Judgement and insight normal;  moving all extremities  Skin: no rashes; no palpable lesions    Labs: all available labs reviewed                        10.1 19.95 )-----------( 317      ( 08 Jul 2025 06:53 )             32.2     07-08    134[L]  |  108  |  27[H]  ----------------------------<  129[H]  5.2   |  19[L]  |  0.85    Ca    7.7[L]      08 Jul 2025 06:53  Phos  3.2     07-08  Mg     2.8     07-08         Urinalysis Basic - ( 08 Jul 2025 06:53 )    Color: x / Appearance: x / SG: x / pH: x  Gluc: 129 mg/dL / Ketone: x  / Bili: x / Urobili: x   Blood: x / Protein: x / Nitrite: x   Leuk Esterase: x / RBC: x / WBC x   Sq Epi: x / Non Sq Epi: x / Bacteria: x    Culture - Joint (07.06.25 @ 20:30)   Gram Stain:   polymorphonuclear leukocytes seen   No organisms seen   by cytocentrifuge  Specimen Source: Knee Knee  Culture Results:   No growthCulture - Urine (07.06.25 @ 13:56)   Specimen Source: Urine None  Culture Results:   <10,000 CFU/mL Normal Urogenital Lashanda  Culture - Blood (07.06.25 @ 11:53)   Specimen Source: Blood None  Culture Results:   No growth at 24 hours  Culture - Blood (07.06.25 @ 10:05)   Specimen Source: Blood None  Culture Results:   No growth at 24 hours    Radiology: all available radiological tests reviewed    ACC: [id number] EXAM:  XR KNEE 3 VIEWS RT   ORDERED BY: FABIEN OTT     PROCEDURE DATE:  07/06/2025          INTERPRETATION:  CLINICAL HISTORY: Suspect RIGHT knee septic arthritis.   TECHNIQUE: AP, lateral, and oblique radiographs    FINDINGS:  No fracture or dislocation.  Severe sclerotic degenerative narrowing of the lateral knee joint   compartment. Medial knee joint compartment height maintained.  No joint effusion.   No gross soft tissue abnormalities..    IMPRESSION:  Lateral knee joint compartment degenerative arthritic narrowing       Advanced directives addressed: full resuscitation

## 2025-07-08 NOTE — PHYSICAL THERAPY INITIAL EVALUATION ADULT - ASSISTIVE DEVICE FOR TOILET TRANSFER, REHAB EVAL
Medicare Wellness Visit  Plan for Preventive Care    A good way for you to stay healthy is to use preventive care.  Medicare covers many services that can help you stay healthy.* The goal of these services is to find any health problems as quickly as possible. Finding problems early can help make them easier to treat.  Your personal plan below lists the services you may need and when they are due.     Health Maintenance Summary     Topic Due On Due Status Completed On    Colorectal Cancer Screening - CT Colonography Feb 25, 2020 Not Due Feb 25, 2015    Abdominal Aortic Aneurysm (AAA) Screening   Completed Jun 28, 2017    Medicare Wellness Visit Jul 18, 2019 Not Due Jul 18, 2018    IMMUNIZATION - DTaP/Tdap/Td Mar 20, 2025 Not Due Mar 20, 2015    Immunization-Influenza Sep 1, 2018 Not Due     Depression Screening Jun 13, 2018 Overdue Jun 13, 2017    Hepatitis C Screening  Completed Jun 13, 2017 Jun 13, 2018 Due On Jun 13, 2017           Preventive Care for Women and Men    Heart Screenings (Cardiovascular):  · Blood tests are used to check your cholesterol, lipid and triglyceride levels. High levels can increase your risk for heart disease and stroke. High levels can be treated with medications, diet and exercise. Lowering your levels can help keep your heart and blood vessels healthy.  Your provider will order these tests if they are needed.    · An ultrasound is done to see if you have an abdominal aortic aneurysm (AAA).  This is an enlargement of one of the main blood vessels that delivers blood to the body.   In the [place], 9,000 deaths are caused by AAA.  You may not even know you have this problem and as many as 1 in 3 people will have a serious problem if it is not treated.  Early diagnosis allows for more effective treatment and cure.  If you have a family history of AAA or are a male age 65-75 who has smoked, you are at higher risk of an AAA.  Your provider can order this test, if  needed.    Colorectal Screening:  · There are many tests that are used to check for cancer of your colon and rectum. You and your provider should discuss what test is best for you and when to have it done.  Options include:  · Screening Colonoscopy: exam of the entire colon, seen through a flexible lighted tube.  · Flexible Sigmoidoscopy: exam of the last third (sigmoid portion) of the colon and rectum, seen through a flexible lighted tube.  · Cologuard DNA stool test: a sample of your stool is used to screen for cancer and unseen blood in your stool.  · Fecal Occult Blood Test: a sample of your stool is studied to find any unseen blood    Flu Shot:  · An immunization that helps to prevent influenza (the flu). You should get this every year. The best time to get the shot is in the fall.    Pneumococcal Shot:  • Vaccines are available that can help prevent pneumococcal disease, which is any type of infection caused by Streptococcus pneumoniae bacteria.   Their use can prevent some cases of pneumonia, meningitis, and sepsis. There are two types of pneumococcal vaccines:   o Conjugate vaccines (PCV-13 or Prevnar 13®) - helps protect against the 13 types of pneumococcal bacteria that are the most common causes of serious infections in children and adults.    o Polysaccharide vaccine (PPSV23 or Hgtjdbygh20®) - helps protect against 23 types of pneumococcal bacteria for patients who are recommended to get it.  These vaccines should be given at least 12 months apart.  A booster is usually not needed.     Hepatitis B Shot:  · An immunization that helps to protect people from getting Hepatitis B. Hepatitis B is a virus that spreads through contact with infected blood or body fluids. Many people with the virus do not have symptoms.  The virus can lead to serious problems, such as liver disease. Some people are at higher risk than others. Your doctor will tell you if you need this shot.     Diabetes Screening:  · A test to  measure sugar (glucose) in your blood is called a fasting blood sugar. Fasting means you cannot have food or drink for at least 8 hours before the test. This test can detect diabetes long before you may notice symptoms.    Glaucoma Screening:  · Glaucoma screening is performed by your eye doctor. The test measures the fluid pressure inside your eyes to determine if you have glaucoma.     Hepatitis C Screening:  · A blood test to see if you have the hepatitis C virus.  Hepatitis C attacks the liver and is a major cause of chronic liver disease.  Medicare will cover a single screening for all adults born between 1945 & 1965, or high risk patients (people who have injected illegal drugs or people who have had blood transfusions).  High risk patients who continue to inject illegal drugs can be screened for Hepatitis C every year.    Smoking and Tobacco-Use Cessation Counseling:  · Tobacco is the single greatest cause of disease and early death in our country today. Medication and counseling together can increase a person’s chance of quitting for good.   · Medicare covers two quitting attempts per year, with four counseling sessions per attempt (eight sessions in a 12 month period)    Preventive Screening tests for Women    Screening Mammograms and Breast Exams:  · An x-ray of your breasts to check for breast cancer before you or your doctor may be able to feel it.  If breast cancer is found early it can usually be treated with success.    Pelvic Exams and Pap Tests:  · An exam to check for cervical and vaginal cancer. A Pap test is a lab test in which cells are taken from your cervix and sent to the lab to look for signs of cervical cancer. If cancer of the cervix is found early, chances for a cure are good. Testing can generally end at age 65, or if a woman has a hysterectomy for a benign condition. Your provider may recommend more frequent testing if certain abnormal results are found.    Bone Mass Measurements:  · A  painless x-ray of your bone density to see if you are at risk for a broken bone. Bone density refers to the thickness of bones or how tightly the bone tissue is packed.    Preventive Screening tests for Men    Prostate Screening:  · PSA - Prostate Cancer blood test.  Experts do not recommend routine screening of healthy men with no signs or symptoms of prostate disease.  However, men should not ignore urinary symptoms, and should discuss their family history with their doctor.    *Medicare pays for many preventive services to keep you healthy. For some of these services, you might have to pay a deductible, coinsurance, and / or copayment.  The amounts vary depending on the type of services you need and the kind of Medicare health plan you have.               rolling walker

## 2025-07-09 LAB
24R-OH-CALCIDIOL SERPL-MCNC: 37.9 NG/ML — SIGNIFICANT CHANGE UP
ADD ON TEST-SPECIMEN IN LAB: SIGNIFICANT CHANGE UP
ALBUMIN SERPL ELPH-MCNC: 2.5 G/DL — LOW (ref 3.3–5)
ALP SERPL-CCNC: 67 U/L — SIGNIFICANT CHANGE UP (ref 40–120)
ALT FLD-CCNC: 12 U/L — SIGNIFICANT CHANGE UP (ref 12–78)
ANION GAP SERPL CALC-SCNC: 3 MMOL/L — LOW (ref 5–17)
AST SERPL-CCNC: 9 U/L — LOW (ref 15–37)
BILIRUB DIRECT SERPL-MCNC: <0.1 MG/DL — SIGNIFICANT CHANGE UP (ref 0–0.3)
BILIRUB INDIRECT FLD-MCNC: >0.1 MG/DL — LOW (ref 0.2–1)
BILIRUB SERPL-MCNC: 0.2 MG/DL — SIGNIFICANT CHANGE UP (ref 0.2–1.2)
BUN SERPL-MCNC: 26 MG/DL — HIGH (ref 7–23)
CALCIUM SERPL-MCNC: 7.4 MG/DL — LOW (ref 8.5–10.1)
CHLORIDE SERPL-SCNC: 112 MMOL/L — HIGH (ref 96–108)
CK SERPL-CCNC: 32 U/L — SIGNIFICANT CHANGE UP (ref 26–192)
CO2 SERPL-SCNC: 23 MMOL/L — SIGNIFICANT CHANGE UP (ref 22–31)
CREAT SERPL-MCNC: 0.78 MG/DL — SIGNIFICANT CHANGE UP (ref 0.5–1.3)
CRP SERPL-MCNC: 42.8 MG/L — HIGH (ref 0–5)
EGFR: 79 ML/MIN/1.73M2 — SIGNIFICANT CHANGE UP
EGFR: 79 ML/MIN/1.73M2 — SIGNIFICANT CHANGE UP
ERYTHROCYTE [SEDIMENTATION RATE] IN BLOOD: 60 MM/HR — HIGH (ref 0–20)
GLUCOSE SERPL-MCNC: 114 MG/DL — HIGH (ref 70–99)
HCT VFR BLD CALC: 28 % — LOW (ref 34.5–45)
HGB BLD-MCNC: 8.8 G/DL — LOW (ref 11.5–15.5)
LDH SERPL L TO P-CCNC: 215 U/L — SIGNIFICANT CHANGE UP (ref 84–241)
MAGNESIUM SERPL-MCNC: 3 MG/DL — HIGH (ref 1.6–2.6)
MCHC RBC-ENTMCNC: 26.7 PG — LOW (ref 27–34)
MCHC RBC-ENTMCNC: 31.4 G/DL — LOW (ref 32–36)
MCV RBC AUTO: 84.8 FL — SIGNIFICANT CHANGE UP (ref 80–100)
NRBC # BLD AUTO: 0 K/UL — SIGNIFICANT CHANGE UP (ref 0–0)
NRBC # FLD: 0 K/UL — SIGNIFICANT CHANGE UP (ref 0–0)
NRBC BLD AUTO-RTO: 0 /100 WBCS — SIGNIFICANT CHANGE UP (ref 0–0)
PHOSPHATE SERPL-MCNC: 1.8 MG/DL — LOW (ref 2.5–4.5)
PLATELET # BLD AUTO: 274 K/UL — SIGNIFICANT CHANGE UP (ref 150–400)
PMV BLD: 9.9 FL — SIGNIFICANT CHANGE UP (ref 7–13)
POTASSIUM SERPL-MCNC: 5.4 MMOL/L — HIGH (ref 3.5–5.3)
POTASSIUM SERPL-SCNC: 5.4 MMOL/L — HIGH (ref 3.5–5.3)
PROCALCITONIN SERPL-MCNC: 0.29 NG/ML — HIGH (ref 0.02–0.1)
PROT SERPL-MCNC: 5.9 GM/DL — LOW (ref 6–8.3)
RBC # BLD: 3.3 M/UL — LOW (ref 3.8–5.2)
RBC # FLD: 16.1 % — HIGH (ref 10.3–14.5)
SODIUM SERPL-SCNC: 138 MMOL/L — SIGNIFICANT CHANGE UP (ref 135–145)
URATE SERPL-MCNC: 6.3 MG/DL — SIGNIFICANT CHANGE UP (ref 2.5–7)
WBC # BLD: 16.25 K/UL — HIGH (ref 3.8–10.5)
WBC # FLD AUTO: 16.25 K/UL — HIGH (ref 3.8–10.5)

## 2025-07-09 PROCEDURE — 71260 CT THORAX DX C+: CPT | Mod: 26

## 2025-07-09 PROCEDURE — 74177 CT ABD & PELVIS W/CONTRAST: CPT | Mod: 26

## 2025-07-09 PROCEDURE — 99233 SBSQ HOSP IP/OBS HIGH 50: CPT

## 2025-07-09 RX ORDER — SODIUM ZIRCONIUM CYCLOSILICATE 5 G/5G
5 POWDER, FOR SUSPENSION ORAL ONCE
Refills: 0 | Status: COMPLETED | OUTPATIENT
Start: 2025-07-09 | End: 2025-07-09

## 2025-07-09 RX ADMIN — Medication 650 MILLIGRAM(S): at 03:21

## 2025-07-09 RX ADMIN — MIDODRINE HYDROCHLORIDE 10 MILLIGRAM(S): 5 TABLET ORAL at 15:45

## 2025-07-09 RX ADMIN — METHYLPREDNISOLONE ACETATE 40 MILLIGRAM(S): 80 INJECTION, SUSPENSION INTRA-ARTICULAR; INTRALESIONAL; INTRAMUSCULAR; SOFT TISSUE at 09:25

## 2025-07-09 RX ADMIN — Medication 2 TABLET(S): at 21:09

## 2025-07-09 RX ADMIN — Medication 10 MILLIGRAM(S): at 09:25

## 2025-07-09 RX ADMIN — SODIUM ZIRCONIUM CYCLOSILICATE 5 GRAM(S): 5 POWDER, FOR SUSPENSION ORAL at 19:38

## 2025-07-09 RX ADMIN — Medication 15 MILLIGRAM(S): at 13:02

## 2025-07-09 RX ADMIN — Medication 7.5 MILLIGRAM(S): at 21:34

## 2025-07-09 RX ADMIN — ENOXAPARIN SODIUM 40 MILLIGRAM(S): 100 INJECTION SUBCUTANEOUS at 13:02

## 2025-07-09 RX ADMIN — Medication 7.5 MILLIGRAM(S): at 22:33

## 2025-07-09 RX ADMIN — MAGNESIUM HYDROXIDE 30 MILLILITER(S): 400 SUSPENSION ORAL at 09:25

## 2025-07-09 RX ADMIN — Medication 40 MILLIGRAM(S): at 09:24

## 2025-07-09 RX ADMIN — Medication 15 MILLIGRAM(S): at 13:30

## 2025-07-09 RX ADMIN — Medication 650 MILLIGRAM(S): at 03:50

## 2025-07-09 RX ADMIN — MIDODRINE HYDROCHLORIDE 10 MILLIGRAM(S): 5 TABLET ORAL at 21:08

## 2025-07-09 RX ADMIN — POLYETHYLENE GLYCOL 3350 17 GRAM(S): 17 POWDER, FOR SOLUTION ORAL at 09:25

## 2025-07-09 RX ADMIN — Medication 2 MILLIGRAM(S): at 01:20

## 2025-07-09 RX ADMIN — Medication 137 MICROGRAM(S): at 06:01

## 2025-07-09 RX ADMIN — Medication 650 MILLIGRAM(S): at 09:22

## 2025-07-09 RX ADMIN — Medication 2 MILLIGRAM(S): at 00:50

## 2025-07-09 NOTE — PROGRESS NOTE ADULT - ASSESSMENT
73 yo F with pmhx of TAVR, Non small cell lung cancer, mets to the adrenal glands s/p RT, NATHALY, Hypothyroidism presenting with weakness and 3 day hx of decrease po intake and fevers. In the ED pt's T max 101 started on decadron and midodrine for low bp and admitted to the ICU for further management. Patient diagnosed with sepsis but unclear source of infection cxr and ua were negative. Patient did have tap done to swollen knee which showed pseudogout. Patient also believed to have adrenal insufficiency started on steroids for further management. Patient stable and downgraded the following day.     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 Marked right upper lobe volume loss likely representing posttreatment change, although coexisting residual or recurrent disease is not excluded. Consider PET/CT to further evaluate.  - oncology follow up as o/p   - pain management - morphine po  7.5 and 15 mg prn    Right knee Pseudogout flare s/p knee aspiration on 7/6/25   -Knee aspirated - sent for CC/Crystals/GS/Cx/PCR - CC: 26k, 95% PMNs, Crystals + for CPPD - Likely pseudogout  -FU GS/Cx/PCR - neg , --> 42 , ESR 83-->60 , procalcitonin 0.29  -WBAT RLE, ACE wrap PRN  - ortho consulted, appreciate recs  follow up with Dr. Gonzalez as an outpatient for further evaluation and management.    HFpEF  , Valvular heart disease AS s/p TAVR   EF this admission 50-55% grade 1 diastolic dysfunction   - hold home losartan 12.5 mg daily   - hold home metoprolol succinate 25 mg daily   - hold home bumetanide 1 mg daily , spironalactone 25 1/2 tab qd  -  on Farxiga    Hyperkalemia  - lokelma x1 dose  - repeat in am    Anemia   - s/p 1 u prbc 7/6   - no source of bleeding   - ok to resume lovenox dvt ppx today  - trend h&h daily   - maintain active type and screen     Hypothyroidism   - cw home synthroid  137   - check TSH, free T4    Constipation for > 1 week  - miralax, senna  - add duclolax supp with MOM     Generalized weakness   Severe protein-calorie malnutrition and Underweight (BMI < 19).  - PT daily, add supplements  - check orthostatics  -  B12, folate wnl  - check vit D     Alopecia   - finasteride     Dispo - IV steroids, laxatives, pain management, supplements , labs in am, d/c planning  esdras

## 2025-07-09 NOTE — CONSULT NOTE ADULT - SUBJECTIVE AND OBJECTIVE BOX
HPI: Pt is a 74y old Female with hx of       PAIN: ( )Yes   ( )No  Level:  Location:  Intensity:    /10  Quality:  Aggravating Factors:  Alleviating Factors:  Radiation:  Duration/Timing:  Impact on ADLs:    DYSPNEA: ( ) Yes  ( ) No  Level:    PAST MEDICAL & SURGICAL HISTORY:  Hypothyroidism      Osteoarthritis  left knee arthroscopy       Eczema      Aortic stenosis      HTN (hypertension)      History of Appendectomy       Section   d/t failure to progress and Repeat       Parotid Mass Removed       S/P left Bunionectomy       Abdominal cyst removed       Gastric Bypass  for Obesity       Vocal Cord Polyp Removed   Denies current vocal cord polyps      H/O total knee replacement, left      History of cataract surgery      History of transcatheter aortic valve replacement (TAVR)      S/P aortic valve replacement          SOCIAL HX:    Hx opiate tolerance ( )YES  ( )NO    Baseline ADLs  (Prior to Admission)  ( ) Independent   ( )Dependent    FAMILY HISTORY:  No pertinent family history in first degree relatives        Review of Systems:    Anxiety-  Depression-  Physical Discomfort-  Dyspnea-  Constipation-  Diarrhea-  Nausea-  Vomiting-  Anorexia-  Weight Loss-   Cough-  Secretions-  Fatigue-  Weakness-  Delirium-    All other systems reviewed and negative  Unable to obtain/Limited due to:      PHYSICAL EXAM:    Vital Signs Last 24 Hrs  T(C): 36.4 (2025 08:43), Max: 36.7 (2025 16:30)  T(F): 97.5 (2025 08:43), Max: 98 (2025 16:30)  HR: 72 (2025 08:43) (64 - 72)  BP: 113/65 (2025 08:43) (110/66 - 124/64)  BP(mean): --  RR: 19 (2025 08:43) (18 - 19)  SpO2: 99% (2025 08:43) (94% - 99%)    Parameters below as of 2025 08:43  Patient On (Oxygen Delivery Method): room air      Daily     Daily     PPSV2:   %  FAST:    General:  Mental Status:  HEENT:  Lungs:  Cardiac:  GI:  :  Ext:  Neuro:      LABS:                        8.8    16.25 )-----------( 274      ( 2025 06:47 )             28.0     07-09    138  |  112[H]  |  26[H]  ----------------------------<  114[H]  5.4[H]   |  23  |  0.78    Ca    7.4[L]      2025 06:47  Phos  1.8       Mg     3.0         TPro  5.9[L]  /  Alb  2.5[L]  /  TBili  0.2  /  DBili  <0.1  /  AST  9[L]  /  ALT  12  /  AlkPhos  67        Albumin: Albumin: 2.5 g/dL ( @ 06:47)      Allergies    No Known Allergies    Intolerances      MEDICATIONS  (STANDING):  bisacodyl Suppository 10 milliGRAM(s) Rectal daily  enoxaparin Injectable 40 milliGRAM(s) SubCutaneous every 24 hours  levothyroxine 137 MICROGram(s) Oral daily  magnesium hydroxide Suspension 30 milliLiter(s) Oral daily  methylPREDNISolone sodium succinate Injectable 40 milliGRAM(s) IV Push daily  midodrine 10 milliGRAM(s) Oral every 8 hours  pantoprazole  Injectable 40 milliGRAM(s) IV Push daily  polyethylene glycol 3350 17 Gram(s) Oral daily  senna 2 Tablet(s) Oral at bedtime  sodium chloride 0.9%. 1000 milliLiter(s) (55 mL/Hr) IV Continuous <Continuous>  sodium zirconium cyclosilicate 5 Gram(s) Oral once    MEDICATIONS  (PRN):  acetaminophen     Tablet .. 650 milliGRAM(s) Oral every 6 hours PRN Temp greater or equal to 38C (100.4F), Mild Pain (1 - 3)  aluminum hydroxide/magnesium hydroxide/simethicone Suspension 30 milliLiter(s) Oral every 4 hours PRN Dyspepsia  morphine  IR 7.5 milliGRAM(s) Oral every 4 hours PRN Moderate Pain (4 - 6)  morphine  IR 15 milliGRAM(s) Oral every 6 hours PRN Severe Pain (7 - 10)  ondansetron Injectable 4 milliGRAM(s) IV Push every 8 hours PRN Nausea and/or Vomiting      RADIOLOGY/ADDITIONAL STUDIES:

## 2025-07-09 NOTE — PROGRESS NOTE ADULT - SUBJECTIVE AND OBJECTIVE BOX
chief complaint of Fever, weakness      HPI:     73 yo F with pmhx of AS s/p  TAVR, Non small cell lung cancer, mets to the adrenal glands s/p RT, NATHALY, Hypothyroidism, HTN  presenting on 7/6/25  with weakness and 3 day hx of decrease po intake and fevers. In the ED pt's T max 101 started on decadron and midodrine for low bp and admitted to the ICU for further management. Patient diagnosed with sepsis but unclear source of infection cxr and ua were negative. Patient did have tap done to swollen knee which showed pseudogout. Patient also believed to have adrenal insufficiency started on steroids for further management. Patient stable and downgraded the following day.     7/8 - chart reviewed, pt seen and examined, complaint of weakness and nausea, constipated for > 1 week, reports + flatus,  not eating for about a week and experiencing lower back pain. The patient attributes her constipation to previous Oxycontin use for pain management related to her lung cancer, which has spread to the adrenal gland. denies cp, dyspnea, afebrile, plan discussed  7/9 - pt seen and examined, + overnight left sided severe  back pain, receiving pain medication, including Tylenol, which provided temporary relief. she recently started radiation therapy, having completed three out of five scheduled treatments for an adrenal gland mass that is pushing on the lungs. The patient reports that taking a deep breath exacerbates the pain.    Review of system- Rest of the review of system are negative except mentioned in HPI    Vital sings reviewed for last 24 h  T(C): 36.6 (07-09-25 @ 15:40), Max: 36.6 (07-09-25 @ 15:40)  T(F): 97.8 (07-09-25 @ 15:40), Max: 97.8 (07-09-25 @ 15:40)  HR: 74 (07-09-25 @ 15:40) (64 - 74)  BP: 102/65 (07-09-25 @ 15:40) (102/65 - 124/64)  RR: 18 (07-09-25 @ 15:40) (18 - 19)  SpO2: 100% (07-09-25 @ 15:40) (94% - 100%)      PHYSICAL EXAM:    Constitutional: NAD, awake and alert   HEENT: PERR, EOMI, Normal Hearing, MMM  Neck: Soft and supple, No LAD, No JVD  Respiratory: Breath sounds are clear bilaterally, No wheezing, rales or rhonchi  Cardiovascular: S1 and S2, regular rate and rhythm, no Murmurs, gallops or rubs  Gastrointestinal: Bowel Sounds present, soft, nontender, nondistended, no guarding, no rebound  Extremities: No peripheral edema  Vascular: 2+ peripheral pulses  Neurological: A/O x 3, no focal deficits  Musculoskeletal: 5/5 strength b/l upper and lower extremities, Left sided lower chest wall tenderness and mid back tenderness  Skin: No rashes  rectal : deferred, not indicated    All labs radiology and other studies reviewed and interpreted :                           8.8    16.25 )-----------( 274      ( 09 Jul 2025 06:47 )             28.0     07-09    138  |  112[H]  |  26[H]  ----------------------------<  114[H]  5.4[H]   |  23  |  0.78    Ca    7.4[L]      09 Jul 2025 06:47  Phos  1.8     07-09  Mg     3.0     07-09    TPro  5.9[L]  /  Alb  2.5[L]  /  TBili  0.2  /  DBili  <0.1  /  AST  9[L]  /  ALT  12  /  AlkPhos  67  07-09        LIVER FUNCTIONS - ( 09 Jul 2025 06:47 )  Alb: 2.5 g/dL / Pro: 5.9 gm/dL / ALK PHOS: 67 U/L / ALT: 12 U/L / AST: 9 U/L / GGT: x            CT Abdomen and Pelvis w/ IV Cont (07.09.25 @ 12:31) >  IMPRESSION:    Left adrenal, splenic and liver lesions suspicious for metastatic disease.    Marked right upper lobe volume loss likely representing posttreatment   change, although coexisting residual or recurrent disease is not   excluded. Consider PET/CT to further evaluate.          Lactate, Blood: 1.1 mmol/L (07.06.25 @ 10:05)    Culture - Joint (07.06.25 @ 20:30)    Gram Stain:   polymorphonuclear leukocytes seen  No organisms seen  by cytocentrifuge   Specimen Source: Knee Knee   Culture Results:   No growth    Culture - Urine (07.06.25 @ 13:56)    Specimen Source: Urine None   Culture Results:   <10,000 CFU/mL Normal Urogenital Lashanda    Culture - Blood (07.06.25 @ 11:53)    Specimen Source: Blood None   Culture Results:   No growth at 48 Hours    Culture - Blood (07.06.25 @ 10:05)    Specimen Source: Blood None   Culture Results:   No growth at 48 Hours      Radiology/Imaging, I have personally reviewed:   Xray Knee 3 Views, Right (07.06.25 @ 18:30) >  IMPRESSION:  Lateral knee joint compartment degenerative arthritic narrowing ..    < from: Xray Chest 1 View- PORTABLE-Urgent (07.06.25 @ 10:34) >  HEART/VASCULAR: The  heart is mildly enlarged in transverse diameter. No   hilar mass or mediastinal widening.. Status post median sternotomy   prominent aortic arch. .    BONES: The visualized osseous thorax is intact.    IMPRESSION:   No radiographic evidence of active chest disease..    < end of copied text >    MEDICATIONS  (STANDING):  bisacodyl Suppository 10 milliGRAM(s) Rectal daily  enoxaparin Injectable 40 milliGRAM(s) SubCutaneous every 24 hours  levothyroxine 137 MICROGram(s) Oral daily  magnesium hydroxide Suspension 30 milliLiter(s) Oral daily  methylPREDNISolone sodium succinate Injectable 40 milliGRAM(s) IV Push daily  midodrine 10 milliGRAM(s) Oral every 8 hours  polyethylene glycol 3350 17 Gram(s) Oral daily  senna 2 Tablet(s) Oral at bedtime    MEDICATIONS  (PRN):  acetaminophen     Tablet .. 650 milliGRAM(s) Oral every 6 hours PRN Temp greater or equal to 38C (100.4F), Mild Pain (1 - 3)  aluminum hydroxide/magnesium hydroxide/simethicone Suspension 30 milliLiter(s) Oral every 4 hours PRN Dyspepsia  ondansetron Injectable 4 milliGRAM(s) IV Push every 8 hours PRN Nausea and/or Vomiting

## 2025-07-09 NOTE — PROGRESS NOTE ADULT - TIME BILLING
I spend mentioned above total minutes on direct patient care on the date of this encounter . This includes reviewing prior documentation, results and imaging in addition to completing a full history and physical examination on the patient. Further tests, medications, and procedures have been ordered as indicated. Laboratory results and the plan of care were communicated to the patient and/or their family member. Supporting documentation was completed and added to the patient's chart.   .
.
I spend mentioned above total minutes on direct patient care on the date of this encounter . This includes reviewing prior documentation, results and imaging in addition to completing a full history and physical examination on the patient. Further tests, medications, and procedures have been ordered as indicated. Laboratory results and the plan of care were communicated to the patient and/or their family member. Supporting documentation was completed and added to the patient's chart.   .

## 2025-07-09 NOTE — CONSULT NOTE ADULT - SUBJECTIVE AND OBJECTIVE BOX
HPI: 73 yo F with pmhx of TAVR, Non small cell lung cancer, mets to the adrenal glands s/p RT, NATHALY, Hypothyroidism presenting with weakness and 3 day hx of decrease po intake and fevers. In the ED pt's T max 101 started on decadron and midodrine for low bp and admitted to the ICU for further management. Patient diagnosed with sepsis but unclear source of infection cxr and ua were negative. Patient did have tap done to swollen knee which showed pseudogout. Patient also believed to have adrenal insufficiency started on steroids for further management. Patient stable and downgraded the following day. Palliative medicine is consulted for assistance with GOC.     Examined pt at bedside,   Reference #: [id number] oxycodone IR 5mg #30 8 days Dr. Momo Hernandez    PAIN: ( )Yes   ( )No  Level:  Location:  Intensity:    /10  Quality:  Aggravating Factors:  Alleviating Factors:  Radiation:  Duration/Timing:  Impact on ADLs:    DYSPNEA: ( ) Yes  ( ) No  Level:    PAST MEDICAL & SURGICAL HISTORY:  Hypothyroidism      Osteoarthritis  left knee arthroscopy       Eczema      Aortic stenosis      HTN (hypertension)      History of Appendectomy       Section   d/t failure to progress and Repeat       Parotid Mass Removed       S/P left Bunionectomy       Abdominal cyst removed       Gastric Bypass  for Obesity       Vocal Cord Polyp Removed 2006  Denies current vocal cord polyps      H/O total knee replacement, left      History of cataract surgery      History of transcatheter aortic valve replacement (TAVR)      S/P aortic valve replacement          SOCIAL HX:    Hx opiate tolerance ( )YES  ( )NO    Baseline ADLs  (Prior to Admission)  ( ) Independent   ( )Dependent    FAMILY HISTORY:  No pertinent family history in first degree relatives        Review of Systems:    Anxiety-  Depression-  Physical Discomfort-  Dyspnea-  Constipation-  Diarrhea-  Nausea-  Vomiting-  Anorexia-  Weight Loss-   Cough-  Secretions-  Fatigue-  Weakness-  Delirium-    All other systems reviewed and negative  Unable to obtain/Limited due to:      PHYSICAL EXAM:    Vital Signs Last 24 Hrs  T(C): 36.4 (2025 08:43), Max: 36.7 (2025 16:30)  T(F): 97.5 (2025 08:43), Max: 98 (2025 16:30)  HR: 72 (2025 08:43) (64 - 72)  BP: 113/65 (2025 08:43) (110/66 - 124/64)  BP(mean): --  RR: 19 (2025 08:43) (18 - 19)  SpO2: 99% (2025 08:43) (94% - 99%)    Parameters below as of 2025 08:43  Patient On (Oxygen Delivery Method): room air      Daily     Daily     PPSV2:   %  FAST:    General:  Mental Status:  HEENT:  Lungs:  Cardiac:  GI:  :  Ext:  Neuro:      LABS:                        8.8    16.25 )-----------( 274      ( 2025 06:47 )             28.0     07-    138  |  112[H]  |  26[H]  ----------------------------<  114[H]  5.4[H]   |  23  |  0.78    Ca    7.4[L]      2025 06:47  Phos  1.8       Mg     3.0         TPro  5.9[L]  /  Alb  2.5[L]  /  TBili  0.2  /  DBili  <0.1  /  AST  9[L]  /  ALT  12  /  AlkPhos  67  -09      Albumin: Albumin: 2.5 g/dL ( @ 06:47)      Allergies    No Known Allergies    Intolerances      MEDICATIONS  (STANDING):  bisacodyl Suppository 10 milliGRAM(s) Rectal daily  enoxaparin Injectable 40 milliGRAM(s) SubCutaneous every 24 hours  levothyroxine 137 MICROGram(s) Oral daily  magnesium hydroxide Suspension 30 milliLiter(s) Oral daily  methylPREDNISolone sodium succinate Injectable 40 milliGRAM(s) IV Push daily  midodrine 10 milliGRAM(s) Oral every 8 hours  pantoprazole  Injectable 40 milliGRAM(s) IV Push daily  polyethylene glycol 3350 17 Gram(s) Oral daily  senna 2 Tablet(s) Oral at bedtime  sodium chloride 0.9%. 1000 milliLiter(s) (55 mL/Hr) IV Continuous <Continuous>  sodium zirconium cyclosilicate 5 Gram(s) Oral once    MEDICATIONS  (PRN):  acetaminophen     Tablet .. 650 milliGRAM(s) Oral every 6 hours PRN Temp greater or equal to 38C (100.4F), Mild Pain (1 - 3)  aluminum hydroxide/magnesium hydroxide/simethicone Suspension 30 milliLiter(s) Oral every 4 hours PRN Dyspepsia  morphine  IR 7.5 milliGRAM(s) Oral every 4 hours PRN Moderate Pain (4 - 6)  morphine  IR 15 milliGRAM(s) Oral every 6 hours PRN Severe Pain (7 - 10)  ondansetron Injectable 4 milliGRAM(s) IV Push every 8 hours PRN Nausea and/or Vomiting      RADIOLOGY/ADDITIONAL STUDIES:   HPI: 73 yo F with pmhx of TAVR, Non small cell lung cancer, mets to the adrenal glands s/p RT, NATHALY, Hypothyroidism presenting with weakness and 3 day hx of decrease po intake and fevers. In the ED pt's T max 101 started on decadron and midodrine for low bp and admitted to the ICU for further management. Patient diagnosed with sepsis but unclear source of infection cxr and ua were negative. Patient did have tap done to swollen knee which showed pseudogout. Patient also believed to have adrenal insufficiency started on steroids for further management. Patient stable and downgraded the following day. Palliative medicine is consulted for assistance with GOC.     Examined pt at bedside, pt sitting in recliner, awake, alert, NAD. States she was taken to CT, has some back pain aggravated by laying flat.   Pt states she was taking oxycodone IR 5mg PO q4hr, 6 times x day. She was not on any bowel regimen and states she was experiencing constipation, last BM prior to hospitalization about 2 weeks?  At this time pt states pain over knee is resolved, and able to ambulate. Pt hopeful to dc home soon and f/u with outpt radonc .   istop reviewed, Reference #: [id number] oxycodone IR 5mg #30 8 days Dr. Momo Hernandez    PAIN: ( x)Yes   ( )No  Level:  Location: lower back   Intensity:   4 /10  Quality:   Aggravating Factors:  Alleviating Factors:  Radiation:  Duration/Timing:  Impact on ADLs:    DYSPNEA: ( ) Yes  ( x) No  Level:    PAST MEDICAL & SURGICAL HISTORY:  Hypothyroidism      Osteoarthritis  left knee arthroscopy       Eczema      Aortic stenosis      HTN (hypertension)      History of Appendectomy       Section   d/t failure to progress and Repeat       Parotid Mass Removed       S/P left Bunionectomy       Abdominal cyst removed       Gastric Bypass  for Obesity       Vocal Cord Polyp Removed   Denies current vocal cord polyps      H/O total knee replacement, left      History of cataract surgery      History of transcatheter aortic valve replacement (TAVR)      S/P aortic valve replacement          SOCIAL HX:    Hx opiate tolerance (x )YES  ( )NO    Baseline ADLs  (Prior to Admission)  (x ) Independent   ( )Dependent    FAMILY HISTORY:  No pertinent family history in first degree relatives        Review of Systems:    Anxiety- denies   Depression- denies   Physical Discomfort- denies   Dyspnea- denies   Constipation- +  Diarrhea-  denies   Nausea- denies   Vomiting- denies   Anorexia- +  Weight Loss-  +  Cough- denies   Secretions- denies   Fatigue- +  Weakness- +  Delirium- denies   All other systems reviewed and negative        PHYSICAL EXAM:    Vital Signs Last 24 Hrs  T(C): 36.4 (2025 08:43), Max: 36.7 (2025 16:30)  T(F): 97.5 (2025 08:43), Max: 98 (2025 16:30)  HR: 72 (2025 08:43) (64 - 72)  BP: 113/65 (2025 08:43) (110/66 - 124/64)  BP(mean): --  RR: 19 (2025 08:43) (18 - 19)  SpO2: 99% (2025 08:43) (94% - 99%)    Parameters below as of 2025 08:43  Patient On (Oxygen Delivery Method): room air      Daily     Daily     PPSV2: 50  %  FAST:    General: elderly female, awake, alert, NAD, pleasant   Lungs: normal resp effort   Cardiac: rrr  GI: soft, nontender   Ext: well perfused       LABS:                        8.8    16.25 )-----------( 274      ( 2025 06:47 )             28.0         138  |  112[H]  |  26[H]  ----------------------------<  114[H]  5.4[H]   |  23  |  0.78    Ca    7.4[L]      2025 06:47  Phos  1.8       Mg     3.0         TPro  5.9[L]  /  Alb  2.5[L]  /  TBili  0.2  /  DBili  <0.1  /  AST  9[L]  /  ALT  12  /  AlkPhos  67        Albumin: Albumin: 2.5 g/dL ( @ 06:47)      Allergies    No Known Allergies    Intolerances      MEDICATIONS  (STANDING):  bisacodyl Suppository 10 milliGRAM(s) Rectal daily  enoxaparin Injectable 40 milliGRAM(s) SubCutaneous every 24 hours  levothyroxine 137 MICROGram(s) Oral daily  magnesium hydroxide Suspension 30 milliLiter(s) Oral daily  methylPREDNISolone sodium succinate Injectable 40 milliGRAM(s) IV Push daily  midodrine 10 milliGRAM(s) Oral every 8 hours  pantoprazole  Injectable 40 milliGRAM(s) IV Push daily  polyethylene glycol 3350 17 Gram(s) Oral daily  senna 2 Tablet(s) Oral at bedtime  sodium chloride 0.9%. 1000 milliLiter(s) (55 mL/Hr) IV Continuous <Continuous>  sodium zirconium cyclosilicate 5 Gram(s) Oral once    MEDICATIONS  (PRN):  acetaminophen     Tablet .. 650 milliGRAM(s) Oral every 6 hours PRN Temp greater or equal to 38C (100.4F), Mild Pain (1 - 3)  aluminum hydroxide/magnesium hydroxide/simethicone Suspension 30 milliLiter(s) Oral every 4 hours PRN Dyspepsia  morphine  IR 7.5 milliGRAM(s) Oral every 4 hours PRN Moderate Pain (4 - 6)  morphine  IR 15 milliGRAM(s) Oral every 6 hours PRN Severe Pain (7 - 10)  ondansetron Injectable 4 milliGRAM(s) IV Push every 8 hours PRN Nausea and/or Vomiting      RADIOLOGY/ADDITIONAL STUDIES:

## 2025-07-09 NOTE — CONSULT NOTE ADULT - CONVERSATION DETAILS
Met with pt at bedside, pt awake, alert, NAD. We discussed Palliative Care team being a supportive team when a patient has ongoing illnesses.  We also discussed that it is not an end of life care service, but can help navigate symptoms and emotional support throughout their hospital stay here.    Pt reports she is feeling much better as compared to initial presentation. States she is undergoing radiation tx for her metastatic NSCLC, and had completed 3 sessions before she had symptoms of pain, inflammation of the knee which prompted her to come to the hospital. Pt reports she has been evaluated by ortho, and tx for pseudogout, hopeful to dc home and continue follow up with radiation oncology.     Pt shares that she has undergone chemo in the past, and was also on hospice care at one point, however graduated from hospice and wanted to pursue additional cancer directed tx so she reached out to [facility] to see if she is eligible for any clinical trials. She states she was started on tx with 5 sessions of radiation to target the mets to adrenals, and wishes to continue with tx when stable to dc.     Prior to hospitalization pt was living at home with her , independent of all ADLs. We reviewed advanced directives including HCP and MOLST forms. Shelli confirms that HCP is her , Micah. She also shares that she has a living will.     We discussed at length patients underlying clinical diagnosis at length.  We discussed resuscitation and risk and benefits of CPR. Risks include, broken ribs, lung puncture, pain and discomfort.   At this time due to patients underlying irreversible diagnosis of metastatic NSCLC I would not recommend CPR because it would not reverse current medical condition.  They understand that DNR means NO CPR and that would allow natural death.  No CPR means no attempt to restart the heart once it has stopped.  Patient verbalized understanding.     We also discussed mechanical ventilation in  an event that they could no longer breath on their own.  Risk and benefits of mechanical ventilation were discussed at length.  At this time, due to patients irreversible medical condition of metastatic NSCLC it is of concern that if patient were to be intubated, extubation may not be possible.  Also intubation would not reverse current medical condition which if progresses its natural course would lead to the patients death.  Patient was able to verbalized understanding.    Pt completed a MOLST form to indicate DNR/DNI with trial of NIV wishes. Additional emotional support and counseling provided.

## 2025-07-09 NOTE — PROGRESS NOTE ADULT - NUTRITIONAL ASSESSMENT
This patient has been assessed with a concern for Malnutrition and has been determined to have a diagnosis/diagnoses of Severe protein-calorie malnutrition and Underweight (BMI < 19).    This patient is being managed with:   Diet Regular-  Entered: Jul 7 2025  8:01AM  
This patient has been assessed with a concern for Malnutrition and has been determined to have a diagnosis/diagnoses of Severe protein-calorie malnutrition and Underweight (BMI < 19).    This patient is being managed with:   Diet Regular-  Entered: Jul 7 2025  8:01AM  
This patient has been assessed with a concern for Malnutrition and has been determined to have a diagnosis/diagnoses of Severe protein-calorie malnutrition and Underweight (BMI < 19).    This patient is being managed with:   Diet Regular-  Supplement Feeding Modality:  Oral  Ensure Plant-Based Cans or Servings Per Day:  3       Frequency:  Three Times a day  Entered: Jul 8 2025  5:58PM

## 2025-07-09 NOTE — PROGRESS NOTE ADULT - CONVERSATION DETAILS
In case of emergencies patient  do not want any aggressive measures being implemented including resuscitation and intubation.  Patient has  DNR/DNI , MOLST  form completed , I asked the  to bring it in   emotional support provided   time spend 18 min
In case of emergencies patient  do not want any aggressive measures being implemented including resuscitation and intubation.  Patient has  DNR/DNI , MOLST  form completed , I asked the  to bring it in   emotional support provided   time spend 18 min

## 2025-07-09 NOTE — CONSULT NOTE ADULT - ASSESSMENT
74 year old female with PMH of Previous TAVR  Non- small Cell Lung CA, mets to adrenal glands,  never had surgery, having RT to adrenal glands NATHALY s/p Gastric Bypass Hypothyroidism The patient received RT recently, presents with weakness and 3 days of poor PO intake and fevers also complaining of right knee pain In ED bp to 70s,  temp to 101 received fluid with MAP around 65 was on decadron. Started on IV abx d/t concern for infection. 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     Fever. Hypotension. Leukocytosis. R knee pseudogout flare. Metastatic Non-small cell lung cancer. Immunocompromised host   - imaging reviewed hd stable  - on steroids   - wbc ct likely reactive   - blood cx no growth  - on empiric abx - cefepime - suggest dc abx and observe  - fu cbc  - ortho f/u noted  - supportive care    IV to PO token is not indicated        Concern for infection with multi-resistant organisms was raised. Prior cultures reviewed. An epidemiologic assessment was performed. There is a significant risk for resistant microorganisms to spread to family members, and/or healthcare staff. The patient will be placed on isolation according to infection control policy. Will reconsider isolation measures based on new culture results and other clinical data as appropriate. Appropriate cultures collected and an appropriate broad spectrum antibiotic therapy will be considered.  
73 yo F with pmhx of TAVR, Non small cell lung cancer, mets to the adrenal glands s/p RT, NATHALY, Hypothyroidism presenting with weakness and 3 day hx of decrease po intake and fevers. In the ED pt's T max 101 started on decadron and midodrine for low bp and admitted to the ICU for further management. Patient diagnosed with sepsis but unclear source of infection cxr and ua were negative. Patient did have tap done to swollen knee which showed pseudogout. Patient also believed to have adrenal insufficiency started on steroids for further management. Patient stable and downgraded the following day. Palliative medicine is consulted for assistance with GOC.    Pseudogout  - ortho following - s/p knee aspiration on 7/6/25       Metastatic NSCLC  - mets to adrenals  - pt follows with [facility] started on radiation tx    Adrenal insufficiency 2/2 mets  - on steroids     Cancer Related Pain  - istop reviewed, Reference #: [id number] oxycodone IR 5mg #30 8 days Dr. Momo Hernandez  current regimen:  - ms IR 7.5mg PO q4hr PRN mod pain   - ms IR 15mg PO q4hr PRN severe pain   recommendations  - continue current regimen , monitor 24hr MEDD, will adjust accordingly   - monitor for opioid toxicity     Opioid Induced Constipation  - goal of 1 soft BM daily or every other day  - daily miralax, senna (can be titrated to max dose 4 tab BID)    HFpEF   Hx of AS s/p TAVR  - Echo: EF 50-55%, grade I diastolic dysfunction    Anemia  - s/p 1 unit PRBC     GOC/ACP  Capacity: pt has capacity   HCP/Surrogate: HCP is spouse, Micah Ledesma [phone]  Code Status: DNR/DNI with trial of NIV   MOLST: completed today  Dispo Plan: pt wishes to continue with outpt f/u for radiation with [facility] , hopeful to return home     Process of Care  --Reviewed dx/treatment problems and alignment with Goals of Care    Physical Aspects of Care  --Pain  patient denies at this time  c/w current managment    --Bowel Regimen  denies constipation  risk for constipation d/t immobility  daily dulcolax    --Dyspnea  No SOB at this time  comfortable and in NAD    --Nausea Vomiting  denies    --Weakness  PT as tolerated     Psychological and Psychiatric Aspects of Care:   --Greif/Bereavment: emotional support provided  --Hx of psychiatric dx: none  -Pastoral Care Available PRN     Social Aspects of Care  -SW involved     Cultural Aspects  -Primary Language: English    Goals of Care:     We discussed Palliative Care team being a supportive team when a patient has ongoing illnesses.  We also discussed that it is not an end of life care service, but can help navigate symptoms and emotional support througout their hospital stay here.      Ethical and Legal Aspects:   NA            Discussed With: Case coordinated with attending and SW and RN     Time Spent: 90 minutes including the care, coordination and counseling of this patient, excluding time spent on ACP.

## 2025-07-10 ENCOUNTER — TRANSCRIPTION ENCOUNTER (OUTPATIENT)
Age: 75
End: 2025-07-10

## 2025-07-10 VITALS
DIASTOLIC BLOOD PRESSURE: 62 MMHG | RESPIRATION RATE: 18 BRPM | HEART RATE: 70 BPM | OXYGEN SATURATION: 97 % | SYSTOLIC BLOOD PRESSURE: 102 MMHG

## 2025-07-10 LAB
ALBUMIN SERPL ELPH-MCNC: 2.6 G/DL — LOW (ref 3.3–5)
ALP SERPL-CCNC: 62 U/L — SIGNIFICANT CHANGE UP (ref 40–120)
ALT FLD-CCNC: 7 U/L — LOW (ref 12–78)
ANION GAP SERPL CALC-SCNC: 5 MMOL/L — SIGNIFICANT CHANGE UP (ref 5–17)
ANION GAP SERPL CALC-SCNC: 5 MMOL/L — SIGNIFICANT CHANGE UP (ref 5–17)
AST SERPL-CCNC: 4 U/L — LOW (ref 15–37)
BILIRUB DIRECT SERPL-MCNC: <0.1 MG/DL — SIGNIFICANT CHANGE UP (ref 0–0.3)
BILIRUB INDIRECT FLD-MCNC: >0.1 MG/DL — LOW (ref 0.2–1)
BILIRUB SERPL-MCNC: 0.2 MG/DL — SIGNIFICANT CHANGE UP (ref 0.2–1.2)
BUN SERPL-MCNC: 20 MG/DL — SIGNIFICANT CHANGE UP (ref 7–23)
BUN SERPL-MCNC: 20 MG/DL — SIGNIFICANT CHANGE UP (ref 7–23)
CALCIUM SERPL-MCNC: 7.9 MG/DL — LOW (ref 8.5–10.1)
CALCIUM SERPL-MCNC: 8.2 MG/DL — LOW (ref 8.5–10.1)
CHLORIDE SERPL-SCNC: 107 MMOL/L — SIGNIFICANT CHANGE UP (ref 96–108)
CHLORIDE SERPL-SCNC: 111 MMOL/L — HIGH (ref 96–108)
CO2 SERPL-SCNC: 22 MMOL/L — SIGNIFICANT CHANGE UP (ref 22–31)
CO2 SERPL-SCNC: 22 MMOL/L — SIGNIFICANT CHANGE UP (ref 22–31)
CREAT SERPL-MCNC: 0.66 MG/DL — SIGNIFICANT CHANGE UP (ref 0.5–1.3)
CREAT SERPL-MCNC: 0.72 MG/DL — SIGNIFICANT CHANGE UP (ref 0.5–1.3)
EGFR: 87 ML/MIN/1.73M2 — SIGNIFICANT CHANGE UP
EGFR: 87 ML/MIN/1.73M2 — SIGNIFICANT CHANGE UP
EGFR: 91 ML/MIN/1.73M2 — SIGNIFICANT CHANGE UP
EGFR: 91 ML/MIN/1.73M2 — SIGNIFICANT CHANGE UP
GLUCOSE SERPL-MCNC: 155 MG/DL — HIGH (ref 70–99)
GLUCOSE SERPL-MCNC: 88 MG/DL — SIGNIFICANT CHANGE UP (ref 70–99)
HCT VFR BLD CALC: 28.5 % — LOW (ref 34.5–45)
HGB BLD-MCNC: 8.6 G/DL — LOW (ref 11.5–15.5)
MAGNESIUM SERPL-MCNC: 3.2 MG/DL — HIGH (ref 1.6–2.6)
MCHC RBC-ENTMCNC: 25.8 PG — LOW (ref 27–34)
MCHC RBC-ENTMCNC: 30.2 G/DL — LOW (ref 32–36)
MCV RBC AUTO: 85.6 FL — SIGNIFICANT CHANGE UP (ref 80–100)
NRBC # BLD AUTO: 0 K/UL — SIGNIFICANT CHANGE UP (ref 0–0)
NRBC # FLD: 0 K/UL — SIGNIFICANT CHANGE UP (ref 0–0)
NRBC BLD AUTO-RTO: 0 /100 WBCS — SIGNIFICANT CHANGE UP (ref 0–0)
PHOSPHATE SERPL-MCNC: 1.4 MG/DL — LOW (ref 2.5–4.5)
PLATELET # BLD AUTO: 303 K/UL — SIGNIFICANT CHANGE UP (ref 150–400)
PMV BLD: 9.8 FL — SIGNIFICANT CHANGE UP (ref 7–13)
POTASSIUM SERPL-MCNC: 5.4 MMOL/L — HIGH (ref 3.5–5.3)
POTASSIUM SERPL-MCNC: 5.4 MMOL/L — HIGH (ref 3.5–5.3)
POTASSIUM SERPL-SCNC: 5.4 MMOL/L — HIGH (ref 3.5–5.3)
POTASSIUM SERPL-SCNC: 5.4 MMOL/L — HIGH (ref 3.5–5.3)
PROT SERPL-MCNC: 6 GM/DL — SIGNIFICANT CHANGE UP (ref 6–8.3)
RBC # BLD: 3.33 M/UL — LOW (ref 3.8–5.2)
RBC # FLD: 16.4 % — HIGH (ref 10.3–14.5)
SODIUM SERPL-SCNC: 134 MMOL/L — LOW (ref 135–145)
SODIUM SERPL-SCNC: 138 MMOL/L — SIGNIFICANT CHANGE UP (ref 135–145)
WBC # BLD: 17.31 K/UL — HIGH (ref 3.8–10.5)
WBC # FLD AUTO: 17.31 K/UL — HIGH (ref 3.8–10.5)

## 2025-07-10 PROCEDURE — 99239 HOSP IP/OBS DSCHRG MGMT >30: CPT

## 2025-07-10 RX ORDER — ACETAMINOPHEN 500 MG/5ML
2 LIQUID (ML) ORAL
Qty: 0 | Refills: 0 | DISCHARGE
Start: 2025-07-10

## 2025-07-10 RX ORDER — BISACODYL 5 MG
1 TABLET, DELAYED RELEASE (ENTERIC COATED) ORAL
Qty: 30 | Refills: 0
Start: 2025-07-10 | End: 2025-08-08

## 2025-07-10 RX ORDER — POLYETHYLENE GLYCOL 3350 17 G/17G
17 POWDER, FOR SOLUTION ORAL
Qty: 510 | Refills: 0
Start: 2025-07-10 | End: 2025-08-08

## 2025-07-10 RX ORDER — MAGNESIUM HYDROXIDE 400 MG/5ML
30 SUSPENSION ORAL
Qty: 900 | Refills: 0
Start: 2025-07-10 | End: 2025-08-08

## 2025-07-10 RX ORDER — NALOXONE HYDROCHLORIDE 0.4 MG/ML
4 INJECTION, SOLUTION INTRAMUSCULAR; INTRAVENOUS; SUBCUTANEOUS
Qty: 1 | Refills: 0
Start: 2025-07-10 | End: 2025-07-10

## 2025-07-10 RX ORDER — ACETAMINOPHEN 500 MG/5ML
2 LIQUID (ML) ORAL
Refills: 0 | DISCHARGE

## 2025-07-10 RX ORDER — SOD PHOS DI, MONO/K PHOS MONO 250 MG
1 TABLET ORAL
Qty: 4 | Refills: 0
Start: 2025-07-10 | End: 2025-07-11

## 2025-07-10 RX ORDER — SOD PHOS DI, MONO/K PHOS MONO 250 MG
1 TABLET ORAL
Refills: 0 | Status: DISCONTINUED | OUTPATIENT
Start: 2025-07-10 | End: 2025-07-10

## 2025-07-10 RX ORDER — MIDODRINE HYDROCHLORIDE 5 MG/1
5 TABLET ORAL EVERY 8 HOURS
Refills: 0 | Status: DISCONTINUED | OUTPATIENT
Start: 2025-07-10 | End: 2025-07-10

## 2025-07-10 RX ORDER — OXYCODONE HYDROCHLORIDE 30 MG/1
1 TABLET ORAL
Refills: 0 | DISCHARGE

## 2025-07-10 RX ORDER — SODIUM ZIRCONIUM CYCLOSILICATE 5 G/5G
5 POWDER, FOR SUSPENSION ORAL ONCE
Refills: 0 | Status: COMPLETED | OUTPATIENT
Start: 2025-07-10 | End: 2025-07-10

## 2025-07-10 RX ADMIN — SODIUM ZIRCONIUM CYCLOSILICATE 5 GRAM(S): 5 POWDER, FOR SUSPENSION ORAL at 11:44

## 2025-07-10 RX ADMIN — MAGNESIUM HYDROXIDE 30 MILLILITER(S): 400 SUSPENSION ORAL at 09:58

## 2025-07-10 RX ADMIN — Medication 10 MILLIGRAM(S): at 10:10

## 2025-07-10 RX ADMIN — Medication 7.5 MILLIGRAM(S): at 05:06

## 2025-07-10 RX ADMIN — Medication 1 PACKET(S): at 17:08

## 2025-07-10 RX ADMIN — MIDODRINE HYDROCHLORIDE 5 MILLIGRAM(S): 5 TABLET ORAL at 12:50

## 2025-07-10 RX ADMIN — Medication 40 MILLIGRAM(S): at 10:01

## 2025-07-10 RX ADMIN — POLYETHYLENE GLYCOL 3350 17 GRAM(S): 17 POWDER, FOR SOLUTION ORAL at 09:57

## 2025-07-10 RX ADMIN — METHYLPREDNISOLONE ACETATE 40 MILLIGRAM(S): 80 INJECTION, SUSPENSION INTRA-ARTICULAR; INTRALESIONAL; INTRAMUSCULAR; SOFT TISSUE at 09:59

## 2025-07-10 RX ADMIN — Medication 7.5 MILLIGRAM(S): at 10:19

## 2025-07-10 RX ADMIN — Medication 7.5 MILLIGRAM(S): at 03:39

## 2025-07-10 RX ADMIN — MIDODRINE HYDROCHLORIDE 10 MILLIGRAM(S): 5 TABLET ORAL at 05:04

## 2025-07-10 RX ADMIN — ENOXAPARIN SODIUM 40 MILLIGRAM(S): 100 INJECTION SUBCUTANEOUS at 11:43

## 2025-07-10 RX ADMIN — Medication 7.5 MILLIGRAM(S): at 11:19

## 2025-07-10 RX ADMIN — Medication 137 MICROGRAM(S): at 05:04

## 2025-07-10 NOTE — DISCHARGE NOTE PROVIDER - NSDCCPCAREPLAN_GEN_ALL_CORE_FT
PRINCIPAL DISCHARGE DIAGNOSIS  Diagnosis: Acute weakness  Assessment and Plan of Treatment: drink plenty of fluids, follow up with your PCP within 1 week   continue home PT      SECONDARY DISCHARGE DIAGNOSES  Diagnosis: Anemia  Assessment and Plan of Treatment: follow up with your hematologist within 1 week    Diagnosis: Leukocytosis  Assessment and Plan of Treatment: no infections was found, follow up with primary oncologist within 1 week for further monitoring    Diagnosis: Adrenal insufficiency  Assessment and Plan of Treatment: continue home medications with pantoprasole for stomach protection while on steroids    Diagnosis: Pseudogout of right knee  Assessment and Plan of Treatment: follow up with Dr. Gonzalez within 1 week for further monitoring    Diagnosis: Heart failure with preserved ejection fraction (HFpEF)  Assessment and Plan of Treatment: stop taking BP medications due to low BP    Diagnosis: Constipation  Assessment and Plan of Treatment: take bowel regument, monitor bowel function at home    Diagnosis: Hyperkalemia  Assessment and Plan of Treatment: repeat blood test in 2-3 days with primary oncologist , improve you fluids intake, avoid diet soda    Diagnosis: Hypophosphatemia  Assessment and Plan of Treatment: take supplements twice daily for 2 days, recheck your phosphate level

## 2025-07-10 NOTE — DISCHARGE NOTE PROVIDER - NSDCMRMEDTOKEN_GEN_ALL_CORE_FT
acetaminophen 325 mg oral tablet: 2 tab(s) orally every 6 hours As needed Temp greater or equal to 38C (100.4F), Mild Pain (1 - 3)  bisacodyl 10 mg rectal suppository: 1 suppository(ies) rectal once a day as needed for  severe constipation  dexAMETHasone 2 mg oral tablet: 1 tab(s) orally 2 times a day  Farxiga 5 mg oral tablet: 1 tab(s) orally once a day  finasteride 5 mg oral tablet: 1 tab(s) orally once a day AM  levothyroxine 137 mcg (0.137 mg) oral tablet: 1 tab(s) orally once a day  LORazepam 0.5 mg oral tablet: orally  magnesium hydroxide 8% oral suspension: 30 milliliter(s) orally once a day as needed for  constipation  morphine 15 mg oral tablet: 1 tab(s) orally every 4 hours as needed for Severe Pain (7 - 10) MDD: 90  Narcan 4 mg/0.1 mL nasal spray: 4 milligram(s) intranasally every 3 minutes spray in nostril  in case of opioids overdose , you can repeat in 3 minutes in alternate nostril and seek emergency medical assistance as soon as possible  pantoprazole 40 mg oral delayed release tablet: 1 tab(s) orally once a day  polyethylene glycol 3350 oral powder for reconstitution: 17 gram(s) orally once a day  potassium phosphate-sodium phosphate 305 mg-700 mg oral tablet: 1 tab(s) orally 2 times a day  Senna 8.6 mg oral tablet: 2 tab(s) orally 2 times a day

## 2025-07-10 NOTE — DISCHARGE NOTE PROVIDER - PROVIDER TOKENS
FREE:[LAST:[oncologist],PHONE:[(   )    -],FAX:[(   )    -],FOLLOWUP:[1-3 days]],FREE:[LAST:[radiologist oncologist],PHONE:[(   )    -],FAX:[(   )    -],FOLLOWUP:[1 week]],FREE:[LAST:[PCP],PHONE:[(   )    -],FAX:[(   )    -],FOLLOWUP:[1 week]]

## 2025-07-10 NOTE — DISCHARGE NOTE PROVIDER - CARE PROVIDER_API CALL
oncologist,   Phone: (   )    -  Fax: (   )    -  Follow Up Time: 1-3 days    radiologist oncologist,   Phone: (   )    -  Fax: (   )    -  Follow Up Time: 1 week    PCP,   Phone: (   )    -  Fax: (   )    -  Follow Up Time: 1 week

## 2025-07-10 NOTE — DISCHARGE NOTE NURSING/CASE MANAGEMENT/SOCIAL WORK - HAS THE PATIENT USED TOBACCO IN THE PAST 30 DAYS?
No Pete Conley is a 13 year old female presenting to the Urgent Care today for headache, abdominal pain, painful when taking in deep breaths, x5 days    OTC use: None today, but has tried Excedrin, imodium, tylenol      Allergies and Medications were reviewed and updated if necessary.    Pharmacy reviewed and updated to Patient's preference.    Patient would like communication of their results via:    [email or web address]    Patient advised staff will contact with positive results only. Patient agrees. Patient instructed can also view results on [employer].

## 2025-07-10 NOTE — DISCHARGE NOTE PROVIDER - HOSPITAL COURSE
chief complaint of Fever, weakness    HPI:     73 yo F with pmhx of AS s/p  TAVR, Non small cell lung cancer, mets to the adrenal glands s/p RT, NATHALY, Hypothyroidism, HTN  presenting on 7/6/25  with weakness and 3 day hx of decrease po intake and fevers. In the ED pt's T max 101 started on decadron and midodrine for low bp and admitted to the ICU for further management. Patient diagnosed with sepsis but unclear source of infection cxr and ua were negative. Patient did have tap done to swollen knee which showed pseudogout. Patient also believed to have adrenal insufficiency started on steroids for further management. Patient stable and downgraded the following day.     7/8 - chart reviewed, pt seen and examined, complaint of weakness and nausea, constipated for > 1 week, reports + flatus,  not eating for about a week and experiencing lower back pain. The patient attributes her constipation to previous Oxycontin use for pain management related to her lung cancer, which has spread to the adrenal gland. denies cp, dyspnea, afebrile, plan discussed  7/9 - pt seen and examined, + overnight left sided severe  back pain, receiving pain medication, including Tylenol, which provided temporary relief. she recently started radiation therapy, having completed three out of five scheduled treatments for an adrenal gland mass that is pushing on the lungs. The patient reports that taking a deep breath exacerbates the pain.  7/10 - pt seen and examined, feels better, pain controlled better . Patient had a good night's sleep and went for a long walk this morning. Patient describes very poor oral intake yesterday, consuming only half a blueberry muffin, coffee, a nutritional supplement, and a small portion of swordfish with asparagus for dinner. drinking diet soda regularly and not consuming adequate water.  Review of system- Rest of the review of system are negative except mentioned in HPI  Vital sings reviewed for last 24 h  T(C): 36.5 (07-10-25 @ 07:42), Max: 36.8 (07-09-25 @ 20:50)  T(F): 97.7 (07-10-25 @ 07:42), Max: 98.2 (07-09-25 @ 20:50)  HR: 72 (07-10-25 @ 12:45) (67 - 74)  BP: 94/62 (07-10-25 @ 12:45) (94/62 - 118/60)  RR: 18 (07-10-25 @ 12:45) (17 - 18)  SpO2: 97% (07-10-25 @ 12:45) (97% - 100%)  PHYSICAL EXAM:    Constitutional: NAD, awake and alert   HEENT: PERR, EOMI, Normal Hearing, MMM  Neck: Soft and supple, No LAD, No JVD  Respiratory: Breath sounds are clear bilaterally, No wheezing, rales or rhonchi  Cardiovascular: S1 and S2, regular rate and rhythm, no Murmurs, gallops or rubs  Gastrointestinal: Bowel Sounds present, soft, nontender, nondistended, no guarding, no rebound  Extremities: No peripheral edema  Vascular: 2+ peripheral pulses  Neurological: A/O x 3, no focal deficits  Musculoskeletal: 5/5 strength b/l upper and lower extremities, Left sided lower chest wall tenderness and mid back tenderness  Skin: No rashes  rectal : deferred, not indicated    All labs radiology and other studies reviewed and interpreted :   Assessment and Plan:   Sepsis, POA sepsis ruled out   Hypotension , Leukocytosis   - trend fever, wbc curve   - c/w midodrine 10 mg q8 hrs, wean as able   -s/p  empiric cefepime - will stop and observe  - f/u blood cultures and urine culture  - neg   - hold home antihypertensives   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 Marked right upper lobe volume loss likely representing posttreatment change, although coexisting residual or recurrent disease is not excluded. Consider PET/CT to further evaluate.  - oncology follow up as o/p   - pain management - morphine po  7.5 and 15 mg prn  Right knee Pseudogout flare s/p knee aspiration on 7/6/25   -Knee aspirated - sent for CC/Crystals/GS/Cx/PCR - CC: 26k, 95% PMNs, Crystals + for CPPD - Likely pseudogout  -FU GS/Cx/PCR - neg , --> 42 , ESR 83-->60 , procalcitonin 0.29  -WBAT RLE, ACE wrap PRN  - ortho consulted, appreciate recs  follow up with Dr. Gonzalez as an outpatient for further evaluation and management.  HFpEF  , Valvular heart disease AS s/p TAVR   EF this admission 50-55% grade 1 diastolic dysfunction   - hold home losartan 12.5 mg daily ,  hold home metoprolol succinate 25 mg daily   - hold home bumetanide 1 mg daily , spironalactone 25 1/2 tab qd,   on Farxiga  Hyperkalemia  - lokelma x1 dose, 1 dose  ,  repeat in am  Anemia   - s/p 1 u prbc 7/6 ,  no source of bleeding   - ok to resume lovenox dvt ppx today,  trend h&h daily ,  maintain active type and screen   Hypothyroidism   - cw home synthroid  137 ,   TSH, free T4 wnl  Constipation for > 1 week  - miralax, senna,  add duclolax supp with MOM   Generalized weakness   Severe protein-calorie malnutrition and Underweight (BMI < 19).  - PT daily, add supplements,  check orthostatics,   B12, folate wnl,  check vit D   Alopecia  - finasteride   Final diagnosis, treatment plan, and follow-up recommendations were discussed and explained to the patient.   The patient was given an opportunity to ask questions concerning the diagnosis and treatment plan.   The patient acknowledged understanding of the diagnosis, treatment, and follow-up recommendations.   The patient was advised to seek urgent care upon discharge if worsening symptoms develop prior to scheduled follow-up.   Time spent on discharge included time with the patient, and also coordinating discharge care as outlined below.  Discharge note faxed to PCP with my contact information to call me back   PCP  Dr. Barroso  Total time spent: 50 min

## 2025-07-10 NOTE — DISCHARGE NOTE NURSING/CASE MANAGEMENT/SOCIAL WORK - PATIENT PORTAL LINK FT
You can access the FollowMyHealth Patient Portal offered by [facility] by registering at the following website: [email or web address]. By joining [facility]’s FollowMyHealth portal, you will also be able to view your health information using other applications (apps) compatible with our system.

## 2025-07-10 NOTE — DISCHARGE NOTE NURSING/CASE MANAGEMENT/SOCIAL WORK - FINANCIAL ASSISTANCE
[facility] provides services at a reduced cost to those who are determined to be eligible through [facility]’s financial assistance program. Information regarding [facility]’s financial assistance program can be found by going to [email or web address] or by calling [phone].

## 2025-07-10 NOTE — DISCHARGE NOTE PROVIDER - NSDCCPTREATMENT_GEN_ALL_CORE_FT
PRINCIPAL PROCEDURE  Procedure: Abdomen CT  Findings and Treatment: FINDINGS:  CHEST:  LUNGS AND LARGE AIRWAYS: Patent central airways. Severe right upper lobe   volume loss compatible with post treatment change, with abnormal right   suprahilar soft tissue. 5 mm right lower lobe nodule, unchanged.  PLEURA: No pleural effusion.  VESSELS: Atherosclerotic arterial calcifications, including coronary   artery calcification. Stable aneurysmal dilatation of the ascending aorta   to 4.2 cm.  HEART: Heart size is normal. No pericardial effusion. Status post aortic   valve repair.  MEDIASTINUM AND CARLOS: No lymphadenopathy.  CHEST WALL AND LOWER NECK: Within normal limits.  ABDOMEN AND PELVIS:  LIVER: Hypoattenuating lesions measuring 1.3 cm at the right hepatic dome   (2; 65) and 6 mm in segment 3 (2; 91).  BILE DUCTS: Normal caliber.  GALLBLADDER: Cholelithiasis.  SPLEEN: Irregular shaped hypoattenuating 2.5 cm mass.  PANCREAS: Within normal limits.  ADRENALS: 7.7 x6.3 cm left adrenal mass.  KIDNEYS/URETERS: Right renal cyst.  BLADDER: Within normal limits.  REPRODUCTIVE ORGANS: Benign appearing bilateral adnexal cysts measuring   1.9 cm on the right and 1.4 cm on the left.  BOWEL: No bowel obstruction. Appendix not visualized. Status post   Nicole-en-Y gastric bypass.  PERITONEUM/RETROPERITONEUM: Within normal limits.  VESSELS: Atherosclerotic changes.  LYMPH NODES: No lymphadenopathy.  ABDOMINAL WALL: Within normal limits.  BONES: No blastic or lytic lesion.  IMPRESSION:  Left adrenal, splenic and liver lesions suspicious for metastatic disease.  Marked right upper lobe volume loss likely representing posttreatment   change, although coexisting residual or recurrent disease is not   excluded. Consider PET/CT to further evaluate.

## 2025-07-18 ENCOUNTER — APPOINTMENT (OUTPATIENT)
Facility: CLINIC | Age: 75
End: 2025-07-18
Payer: MEDICARE

## 2025-07-18 VITALS — HEIGHT: 68 IN | WEIGHT: 152 LBS | BODY MASS INDEX: 23.04 KG/M2

## 2025-07-18 PROBLEM — M17.11 PRIMARY OSTEOARTHRITIS OF RIGHT KNEE: Status: ACTIVE | Noted: 2025-07-18

## 2025-07-18 PROCEDURE — 99203 OFFICE O/P NEW LOW 30 MIN: CPT

## 2025-07-18 RX ORDER — CHLORHEXIDINE GLUCONATE 4 %
400 LIQUID (ML) TOPICAL
Refills: 0 | Status: ACTIVE | COMMUNITY

## 2025-07-18 RX ORDER — LOSARTAN POTASSIUM 25 MG/1
25 TABLET, FILM COATED ORAL
Refills: 0 | Status: ACTIVE | COMMUNITY

## 2025-07-18 RX ORDER — DAPAGLIFLOZIN 5 MG/1
5 TABLET, FILM COATED ORAL
Refills: 0 | Status: ACTIVE | COMMUNITY

## 2025-07-18 RX ORDER — LEVOTHYROXINE SODIUM 0.15 MG/1
150 TABLET ORAL
Refills: 0 | Status: ACTIVE | COMMUNITY

## 2025-07-18 RX ORDER — FINASTERIDE 5 MG/1
5 TABLET, FILM COATED ORAL
Refills: 0 | Status: ACTIVE | COMMUNITY

## 2025-07-18 RX ORDER — METOPROLOL TARTRATE 25 MG/1
25 TABLET ORAL
Refills: 0 | Status: ACTIVE | COMMUNITY

## 2025-07-18 RX ORDER — SENNOSIDES 8.6 MG
TABLET ORAL
Refills: 0 | Status: ACTIVE | COMMUNITY

## 2025-07-18 RX ORDER — BUMETANIDE 1 MG/1
1 TABLET ORAL
Refills: 0 | Status: ACTIVE | COMMUNITY

## 2025-07-18 RX ORDER — SPIRONOLACTONE 25 MG/1
25 TABLET ORAL
Refills: 0 | Status: ACTIVE | COMMUNITY

## 2025-07-20 LAB
CULTURE RESULTS: SIGNIFICANT CHANGE UP
SPECIMEN SOURCE: SIGNIFICANT CHANGE UP

## 2025-07-23 DIAGNOSIS — Z66 DO NOT RESUSCITATE: ICD-10-CM

## 2025-07-23 DIAGNOSIS — Z79.82 LONG TERM (CURRENT) USE OF ASPIRIN: ICD-10-CM

## 2025-07-23 DIAGNOSIS — E83.39 OTHER DISORDERS OF PHOSPHORUS METABOLISM: ICD-10-CM

## 2025-07-23 DIAGNOSIS — I50.30 UNSPECIFIED DIASTOLIC (CONGESTIVE) HEART FAILURE: ICD-10-CM

## 2025-07-23 DIAGNOSIS — C34.90 MALIGNANT NEOPLASM OF UNSPECIFIED PART OF UNSPECIFIED BRONCHUS OR LUNG: ICD-10-CM

## 2025-07-23 DIAGNOSIS — C78.89 SECONDARY MALIGNANT NEOPLASM OF OTHER DIGESTIVE ORGANS: ICD-10-CM

## 2025-07-23 DIAGNOSIS — C78.7 SECONDARY MALIGNANT NEOPLASM OF LIVER AND INTRAHEPATIC BILE DUCT: ICD-10-CM

## 2025-07-23 DIAGNOSIS — C79.72 SECONDARY MALIGNANT NEOPLASM OF LEFT ADRENAL GLAND: ICD-10-CM

## 2025-07-23 DIAGNOSIS — T40.2X5A ADVERSE EFFECT OF OTHER OPIOIDS, INITIAL ENCOUNTER: ICD-10-CM

## 2025-07-23 DIAGNOSIS — Z79.890 HORMONE REPLACEMENT THERAPY: ICD-10-CM

## 2025-07-23 DIAGNOSIS — M17.11 UNILATERAL PRIMARY OSTEOARTHRITIS, RIGHT KNEE: ICD-10-CM

## 2025-07-23 DIAGNOSIS — Z95.2 PRESENCE OF PROSTHETIC HEART VALVE: ICD-10-CM

## 2025-07-23 DIAGNOSIS — I95.9 HYPOTENSION, UNSPECIFIED: ICD-10-CM

## 2025-07-23 DIAGNOSIS — Z96.652 PRESENCE OF LEFT ARTIFICIAL KNEE JOINT: ICD-10-CM

## 2025-07-23 DIAGNOSIS — Z11.52 ENCOUNTER FOR SCREENING FOR COVID-19: ICD-10-CM

## 2025-07-23 DIAGNOSIS — R50.9 FEVER, UNSPECIFIED: ICD-10-CM

## 2025-07-23 DIAGNOSIS — Z98.49 CATARACT EXTRACTION STATUS, UNSPECIFIED EYE: ICD-10-CM

## 2025-07-23 DIAGNOSIS — K59.03 DRUG INDUCED CONSTIPATION: ICD-10-CM

## 2025-07-23 DIAGNOSIS — D63.0 ANEMIA IN NEOPLASTIC DISEASE: ICD-10-CM

## 2025-07-23 DIAGNOSIS — I11.0 HYPERTENSIVE HEART DISEASE WITH HEART FAILURE: ICD-10-CM

## 2025-07-23 DIAGNOSIS — Z87.891 PERSONAL HISTORY OF NICOTINE DEPENDENCE: ICD-10-CM

## 2025-07-23 DIAGNOSIS — E03.9 HYPOTHYROIDISM, UNSPECIFIED: ICD-10-CM

## 2025-07-23 DIAGNOSIS — E87.5 HYPERKALEMIA: ICD-10-CM

## 2025-07-23 DIAGNOSIS — R53.1 WEAKNESS: ICD-10-CM

## 2025-07-23 DIAGNOSIS — M11.261 OTHER CHONDROCALCINOSIS, RIGHT KNEE: ICD-10-CM

## 2025-07-23 DIAGNOSIS — L65.9 NONSCARRING HAIR LOSS, UNSPECIFIED: ICD-10-CM

## 2025-07-23 DIAGNOSIS — E43 UNSPECIFIED SEVERE PROTEIN-CALORIE MALNUTRITION: ICD-10-CM

## 2025-07-23 DIAGNOSIS — G89.3 NEOPLASM RELATED PAIN (ACUTE) (CHRONIC): ICD-10-CM

## 2025-07-23 DIAGNOSIS — E27.40 UNSPECIFIED ADRENOCORTICAL INSUFFICIENCY: ICD-10-CM

## 2025-07-23 DIAGNOSIS — D84.9 IMMUNODEFICIENCY, UNSPECIFIED: ICD-10-CM

## 2025-08-15 ENCOUNTER — INPATIENT (INPATIENT)
Facility: HOSPITAL | Age: 75
LOS: 3 days | Discharge: HOME | DRG: 312 | End: 2025-08-19
Attending: HOSPITALIST | Admitting: HOSPITALIST
Payer: MEDICARE

## 2025-08-15 VITALS
TEMPERATURE: 97 F | SYSTOLIC BLOOD PRESSURE: 103 MMHG | DIASTOLIC BLOOD PRESSURE: 72 MMHG | RESPIRATION RATE: 32 BRPM | HEIGHT: 68 IN | OXYGEN SATURATION: 97 % | HEART RATE: 130 BPM

## 2025-08-15 DIAGNOSIS — Z95.2 PRESENCE OF PROSTHETIC HEART VALVE: Chronic | ICD-10-CM

## 2025-08-15 DIAGNOSIS — R55 SYNCOPE AND COLLAPSE: ICD-10-CM

## 2025-08-15 DIAGNOSIS — E03.9 HYPOTHYROIDISM, UNSPECIFIED: ICD-10-CM

## 2025-08-15 DIAGNOSIS — Z29.9 ENCOUNTER FOR PROPHYLACTIC MEASURES, UNSPECIFIED: ICD-10-CM

## 2025-08-15 DIAGNOSIS — Z96.652 PRESENCE OF LEFT ARTIFICIAL KNEE JOINT: Chronic | ICD-10-CM

## 2025-08-15 DIAGNOSIS — D72.829 ELEVATED WHITE BLOOD CELL COUNT, UNSPECIFIED: ICD-10-CM

## 2025-08-15 DIAGNOSIS — I50.32 CHRONIC DIASTOLIC (CONGESTIVE) HEART FAILURE: ICD-10-CM

## 2025-08-15 DIAGNOSIS — C34.90 MALIGNANT NEOPLASM OF UNSPECIFIED PART OF UNSPECIFIED BRONCHUS OR LUNG: ICD-10-CM

## 2025-08-15 DIAGNOSIS — M11.20 OTHER CHONDROCALCINOSIS, UNSPECIFIED SITE: ICD-10-CM

## 2025-08-15 DIAGNOSIS — D64.9 ANEMIA, UNSPECIFIED: ICD-10-CM

## 2025-08-15 DIAGNOSIS — Z98.49 CATARACT EXTRACTION STATUS, UNSPECIFIED EYE: Chronic | ICD-10-CM

## 2025-08-15 DIAGNOSIS — E27.40 UNSPECIFIED ADRENOCORTICAL INSUFFICIENCY: ICD-10-CM

## 2025-08-15 LAB
ADD ON TEST-SPECIMEN IN LAB: SIGNIFICANT CHANGE UP
ALBUMIN SERPL ELPH-MCNC: 2.1 G/DL — LOW (ref 3.3–5)
ALP SERPL-CCNC: 91 U/L — SIGNIFICANT CHANGE UP (ref 40–120)
ALT FLD-CCNC: 10 U/L — LOW (ref 12–78)
ANION GAP SERPL CALC-SCNC: 8 MMOL/L — SIGNIFICANT CHANGE UP (ref 5–17)
ANISOCYTOSIS BLD QL: SLIGHT — SIGNIFICANT CHANGE UP
APPEARANCE UR: CLEAR — SIGNIFICANT CHANGE UP
APTT BLD: 29.5 SEC — SIGNIFICANT CHANGE UP (ref 26.1–36.8)
AST SERPL-CCNC: 9 U/L — LOW (ref 15–37)
BACTERIA # UR AUTO: NEGATIVE /HPF — SIGNIFICANT CHANGE UP
BASOPHILS # BLD AUTO: 0.09 K/UL — SIGNIFICANT CHANGE UP (ref 0–0.2)
BASOPHILS NFR BLD AUTO: 0.8 % — SIGNIFICANT CHANGE UP (ref 0–2)
BILIRUB SERPL-MCNC: 0.5 MG/DL — SIGNIFICANT CHANGE UP (ref 0.2–1.2)
BILIRUB UR-MCNC: NEGATIVE — SIGNIFICANT CHANGE UP
BLD GP AB SCN SERPL QL: SIGNIFICANT CHANGE UP
BUN SERPL-MCNC: 19 MG/DL — SIGNIFICANT CHANGE UP (ref 7–23)
CALCIUM SERPL-MCNC: 8.6 MG/DL — SIGNIFICANT CHANGE UP (ref 8.5–10.1)
CAST: 11 /LPF — HIGH (ref 0–4)
CHLORIDE SERPL-SCNC: 102 MMOL/L — SIGNIFICANT CHANGE UP (ref 96–108)
CO2 SERPL-SCNC: 22 MMOL/L — SIGNIFICANT CHANGE UP (ref 22–31)
COLOR SPEC: SIGNIFICANT CHANGE UP
CREAT SERPL-MCNC: 0.97 MG/DL — SIGNIFICANT CHANGE UP (ref 0.5–1.3)
D DIMER BLD IA.RAPID-MCNC: 2420 NG/ML DDU — HIGH
DIFF PNL FLD: NEGATIVE — SIGNIFICANT CHANGE UP
EGFR: 60 ML/MIN/1.73M2 — SIGNIFICANT CHANGE UP
EGFR: 60 ML/MIN/1.73M2 — SIGNIFICANT CHANGE UP
ELLIPTOCYTES BLD QL SMEAR: SLIGHT — SIGNIFICANT CHANGE UP
EOSINOPHIL # BLD AUTO: 0.09 K/UL — SIGNIFICANT CHANGE UP (ref 0–0.5)
EOSINOPHIL NFR BLD AUTO: 0.8 % — SIGNIFICANT CHANGE UP (ref 0–6)
EPI CELLS # UR: PRESENT
FLUAV AG NPH QL: SIGNIFICANT CHANGE UP
FLUBV AG NPH QL: SIGNIFICANT CHANGE UP
GLUCOSE BLDC GLUCOMTR-MCNC: 117 MG/DL — HIGH (ref 70–99)
GLUCOSE SERPL-MCNC: 104 MG/DL — HIGH (ref 70–99)
GLUCOSE UR QL: NEGATIVE MG/DL — SIGNIFICANT CHANGE UP
HCT VFR BLD CALC: 19.5 % — CRITICAL LOW (ref 34.5–45)
HCT VFR BLD CALC: 24.1 % — LOW (ref 34.5–45)
HGB BLD-MCNC: 6 G/DL — CRITICAL LOW (ref 11.5–15.5)
HGB BLD-MCNC: 7.4 G/DL — LOW (ref 11.5–15.5)
HYALINE CASTS # UR AUTO: PRESENT
HYPOCHROMIA BLD QL: ABNORMAL
IMM GRANULOCYTES # BLD AUTO: 0.18 K/UL — HIGH (ref 0–0.07)
IMM GRANULOCYTES NFR BLD AUTO: 1.5 % — HIGH (ref 0–0.9)
INR BLD: 1.15 RATIO — SIGNIFICANT CHANGE UP (ref 0.85–1.16)
KETONES UR QL: NEGATIVE MG/DL — SIGNIFICANT CHANGE UP
LACTATE SERPL-SCNC: 1.7 MMOL/L — SIGNIFICANT CHANGE UP (ref 0.7–2)
LEUKOCYTE ESTERASE UR-ACNC: ABNORMAL
LYMPHOCYTES # BLD AUTO: 0.32 K/UL — LOW (ref 1–3.3)
LYMPHOCYTES NFR BLD AUTO: 2.7 % — LOW (ref 13–44)
MACROCYTES BLD QL: SLIGHT — SIGNIFICANT CHANGE UP
MANUAL SMEAR VERIFICATION: SIGNIFICANT CHANGE UP
MCHC RBC-ENTMCNC: 24.6 PG — LOW (ref 27–34)
MCHC RBC-ENTMCNC: 24.7 PG — LOW (ref 27–34)
MCHC RBC-ENTMCNC: 30.7 G/DL — LOW (ref 32–36)
MCHC RBC-ENTMCNC: 30.8 G/DL — LOW (ref 32–36)
MCV RBC AUTO: 79.9 FL — LOW (ref 80–100)
MCV RBC AUTO: 80.3 FL — SIGNIFICANT CHANGE UP (ref 80–100)
MICROCYTES BLD QL: SLIGHT — SIGNIFICANT CHANGE UP
MONOCYTES # BLD AUTO: 1.06 K/UL — HIGH (ref 0–0.9)
MONOCYTES NFR BLD AUTO: 8.9 % — SIGNIFICANT CHANGE UP (ref 2–14)
NEUTROPHILS # BLD AUTO: 10.16 K/UL — HIGH (ref 1.8–7.4)
NEUTROPHILS NFR BLD AUTO: 85.3 % — HIGH (ref 43–77)
NITRITE UR-MCNC: NEGATIVE — SIGNIFICANT CHANGE UP
NRBC # BLD AUTO: 0 K/UL — SIGNIFICANT CHANGE UP (ref 0–0)
NRBC # BLD AUTO: 0 K/UL — SIGNIFICANT CHANGE UP (ref 0–0)
NRBC # FLD: 0 K/UL — SIGNIFICANT CHANGE UP (ref 0–0)
NRBC # FLD: 0 K/UL — SIGNIFICANT CHANGE UP (ref 0–0)
NRBC BLD AUTO-RTO: 0 /100 WBCS — SIGNIFICANT CHANGE UP (ref 0–0)
NRBC BLD AUTO-RTO: 0 /100 WBCS — SIGNIFICANT CHANGE UP (ref 0–0)
PH UR: 5.5 — SIGNIFICANT CHANGE UP (ref 5–8)
PLAT MORPH BLD: NORMAL — SIGNIFICANT CHANGE UP
PLATELET # BLD AUTO: 285 K/UL — SIGNIFICANT CHANGE UP (ref 150–400)
PLATELET # BLD AUTO: 287 K/UL — SIGNIFICANT CHANGE UP (ref 150–400)
PMV BLD: 10 FL — SIGNIFICANT CHANGE UP (ref 7–13)
PMV BLD: 9.8 FL — SIGNIFICANT CHANGE UP (ref 7–13)
POTASSIUM SERPL-MCNC: 4.5 MMOL/L — SIGNIFICANT CHANGE UP (ref 3.5–5.3)
POTASSIUM SERPL-SCNC: 4.5 MMOL/L — SIGNIFICANT CHANGE UP (ref 3.5–5.3)
PROT SERPL-MCNC: 6 GM/DL — SIGNIFICANT CHANGE UP (ref 6–8.3)
PROT UR-MCNC: 30 MG/DL
PROTHROM AB SERPL-ACNC: 13.6 SEC — HIGH (ref 9.9–13.4)
RAPID RVP RESULT: SIGNIFICANT CHANGE UP
RBC # BLD: 2.44 M/UL — LOW (ref 3.8–5.2)
RBC # BLD: 3 M/UL — LOW (ref 3.8–5.2)
RBC # FLD: 17.4 % — HIGH (ref 10.3–14.5)
RBC # FLD: 17.5 % — HIGH (ref 10.3–14.5)
RBC BLD AUTO: ABNORMAL
RBC CASTS # UR COMP ASSIST: 2 /HPF — SIGNIFICANT CHANGE UP (ref 0–4)
RSV RNA NPH QL NAA+NON-PROBE: SIGNIFICANT CHANGE UP
SARS-COV-2 RNA SPEC QL NAA+PROBE: SIGNIFICANT CHANGE UP
SARS-COV-2 RNA SPEC QL NAA+PROBE: SIGNIFICANT CHANGE UP
SCHISTOCYTES BLD QL AUTO: SLIGHT — SIGNIFICANT CHANGE UP
SODIUM SERPL-SCNC: 132 MMOL/L — LOW (ref 135–145)
SOURCE RESPIRATORY: SIGNIFICANT CHANGE UP
SP GR SPEC: 1.02 — SIGNIFICANT CHANGE UP (ref 1–1.03)
SQUAMOUS # UR AUTO: 2 /HPF — SIGNIFICANT CHANGE UP (ref 0–5)
TROPONIN I, HIGH SENSITIVITY RESULT: 5.36 NG/L — SIGNIFICANT CHANGE UP
UROBILINOGEN FLD QL: 1 MG/DL — SIGNIFICANT CHANGE UP (ref 0.2–1)
WBC # BLD: 11.9 K/UL — HIGH (ref 3.8–10.5)
WBC # BLD: 8.35 K/UL — SIGNIFICANT CHANGE UP (ref 3.8–10.5)
WBC # FLD AUTO: 11.9 K/UL — HIGH (ref 3.8–10.5)
WBC # FLD AUTO: 8.35 K/UL — SIGNIFICANT CHANGE UP (ref 3.8–10.5)
WBC MORPHOLOGY: NORMAL — SIGNIFICANT CHANGE UP
WBC UR QL: 1 /HPF — SIGNIFICANT CHANGE UP (ref 0–5)

## 2025-08-15 PROCEDURE — 83735 ASSAY OF MAGNESIUM: CPT

## 2025-08-15 PROCEDURE — 80048 BASIC METABOLIC PNL TOTAL CA: CPT

## 2025-08-15 PROCEDURE — 83615 LACTATE (LD) (LDH) ENZYME: CPT

## 2025-08-15 PROCEDURE — 86901 BLOOD TYPING SEROLOGIC RH(D): CPT

## 2025-08-15 PROCEDURE — 83010 ASSAY OF HAPTOGLOBIN QUANT: CPT

## 2025-08-15 PROCEDURE — 99285 EMERGENCY DEPT VISIT HI MDM: CPT

## 2025-08-15 PROCEDURE — 84100 ASSAY OF PHOSPHORUS: CPT

## 2025-08-15 PROCEDURE — 71045 X-RAY EXAM CHEST 1 VIEW: CPT | Mod: 26

## 2025-08-15 PROCEDURE — 74177 CT ABD & PELVIS W/CONTRAST: CPT | Mod: 26

## 2025-08-15 PROCEDURE — 85027 COMPLETE CBC AUTOMATED: CPT

## 2025-08-15 PROCEDURE — 80053 COMPREHEN METABOLIC PANEL: CPT

## 2025-08-15 PROCEDURE — 85045 AUTOMATED RETICULOCYTE COUNT: CPT

## 2025-08-15 PROCEDURE — 97163 PT EVAL HIGH COMPLEX 45 MIN: CPT | Mod: GP

## 2025-08-15 PROCEDURE — 99223 1ST HOSP IP/OBS HIGH 75: CPT

## 2025-08-15 PROCEDURE — 36415 COLL VENOUS BLD VENIPUNCTURE: CPT

## 2025-08-15 PROCEDURE — 82962 GLUCOSE BLOOD TEST: CPT

## 2025-08-15 PROCEDURE — 86850 RBC ANTIBODY SCREEN: CPT

## 2025-08-15 PROCEDURE — 85025 COMPLETE CBC W/AUTO DIFF WBC: CPT

## 2025-08-15 PROCEDURE — 86923 COMPATIBILITY TEST ELECTRIC: CPT

## 2025-08-15 PROCEDURE — P9016: CPT

## 2025-08-15 PROCEDURE — 71275 CT ANGIOGRAPHY CHEST: CPT | Mod: 26

## 2025-08-15 PROCEDURE — 86900 BLOOD TYPING SEROLOGIC ABO: CPT

## 2025-08-15 PROCEDURE — 70450 CT HEAD/BRAIN W/O DYE: CPT | Mod: 26

## 2025-08-15 PROCEDURE — 36430 TRANSFUSION BLD/BLD COMPNT: CPT

## 2025-08-15 PROCEDURE — 97116 GAIT TRAINING THERAPY: CPT | Mod: GP

## 2025-08-15 RX ORDER — DAPAGLIFLOZIN 5 MG/1
1 TABLET, FILM COATED ORAL
Refills: 0 | DISCHARGE

## 2025-08-15 RX ORDER — LEVOTHYROXINE SODIUM 300 MCG
137 TABLET ORAL DAILY
Refills: 0 | Status: DISCONTINUED | OUTPATIENT
Start: 2025-08-15 | End: 2025-08-19

## 2025-08-15 RX ORDER — LEVOTHYROXINE SODIUM 300 MCG
1 TABLET ORAL
Qty: 0 | Refills: 0 | DISCHARGE

## 2025-08-15 RX ORDER — PIPERACILLIN-TAZO-DEXTROSE,ISO 3.375G/5
3.38 IV SOLUTION, PIGGYBACK PREMIX FROZEN(ML) INTRAVENOUS EVERY 8 HOURS
Refills: 0 | Status: DISCONTINUED | OUTPATIENT
Start: 2025-08-15 | End: 2025-08-18

## 2025-08-15 RX ORDER — ACETAMINOPHEN 500 MG/5ML
1000 LIQUID (ML) ORAL ONCE
Refills: 0 | Status: COMPLETED | OUTPATIENT
Start: 2025-08-15 | End: 2025-08-15

## 2025-08-15 RX ORDER — SENNA 187 MG
2 TABLET ORAL
Refills: 0 | DISCHARGE

## 2025-08-15 RX ORDER — MELATONIN 5 MG
3 TABLET ORAL AT BEDTIME
Refills: 0 | Status: DISCONTINUED | OUTPATIENT
Start: 2025-08-15 | End: 2025-08-19

## 2025-08-15 RX ORDER — DEXAMETHASONE 0.5 MG/1
1 TABLET ORAL
Refills: 0 | DISCHARGE

## 2025-08-15 RX ORDER — ENOXAPARIN SODIUM 100 MG/ML
40 INJECTION SUBCUTANEOUS EVERY 24 HOURS
Refills: 0 | Status: DISCONTINUED | OUTPATIENT
Start: 2025-08-15 | End: 2025-08-19

## 2025-08-15 RX ORDER — SODIUM CHLORIDE 9 G/1000ML
1000 INJECTION, SOLUTION INTRAVENOUS ONCE
Refills: 0 | Status: DISCONTINUED | OUTPATIENT
Start: 2025-08-15 | End: 2025-08-15

## 2025-08-15 RX ORDER — HYDROCORTISONE 20 MG
50 TABLET ORAL EVERY 8 HOURS
Refills: 0 | Status: DISCONTINUED | OUTPATIENT
Start: 2025-08-15 | End: 2025-08-18

## 2025-08-15 RX ORDER — ACETAMINOPHEN 500 MG/5ML
650 LIQUID (ML) ORAL EVERY 6 HOURS
Refills: 0 | Status: DISCONTINUED | OUTPATIENT
Start: 2025-08-15 | End: 2025-08-19

## 2025-08-15 RX ORDER — MAGNESIUM, ALUMINUM HYDROXIDE 200-200 MG
30 TABLET,CHEWABLE ORAL EVERY 4 HOURS
Refills: 0 | Status: DISCONTINUED | OUTPATIENT
Start: 2025-08-15 | End: 2025-08-19

## 2025-08-15 RX ORDER — LORAZEPAM 4 MG/ML
1 VIAL (ML) INJECTION
Refills: 0 | DISCHARGE

## 2025-08-15 RX ORDER — CEFEPIME 2 G/20ML
2000 INJECTION, POWDER, FOR SOLUTION INTRAVENOUS ONCE
Refills: 0 | Status: COMPLETED | OUTPATIENT
Start: 2025-08-15 | End: 2025-08-15

## 2025-08-15 RX ORDER — ONDANSETRON HCL/PF 4 MG/2 ML
4 VIAL (ML) INJECTION EVERY 8 HOURS
Refills: 0 | Status: DISCONTINUED | OUTPATIENT
Start: 2025-08-15 | End: 2025-08-19

## 2025-08-15 RX ORDER — HYDROCORTISONE 20 MG
100 TABLET ORAL ONCE
Refills: 0 | Status: COMPLETED | OUTPATIENT
Start: 2025-08-15 | End: 2025-08-15

## 2025-08-15 RX ORDER — MIDODRINE HYDROCHLORIDE 5 MG/1
10 TABLET ORAL EVERY 8 HOURS
Refills: 0 | Status: DISCONTINUED | OUTPATIENT
Start: 2025-08-15 | End: 2025-08-18

## 2025-08-15 RX ADMIN — Medication 100 MILLIGRAM(S): at 19:50

## 2025-08-15 RX ADMIN — CEFEPIME 2000 MILLIGRAM(S): 2 INJECTION, POWDER, FOR SOLUTION INTRAVENOUS at 15:13

## 2025-08-15 RX ADMIN — Medication 25 GRAM(S): at 22:45

## 2025-08-15 RX ADMIN — MIDODRINE HYDROCHLORIDE 10 MILLIGRAM(S): 5 TABLET ORAL at 19:51

## 2025-08-15 RX ADMIN — Medication 400 MILLIGRAM(S): at 15:07

## 2025-08-15 RX ADMIN — Medication 1850 MILLILITER(S): at 15:13

## 2025-08-16 LAB
ADD ON TEST-SPECIMEN IN LAB: SIGNIFICANT CHANGE UP
ADD ON TEST-SPECIMEN IN LAB: SIGNIFICANT CHANGE UP
ALBUMIN SERPL ELPH-MCNC: 1.8 G/DL — LOW (ref 3.3–5)
ALP SERPL-CCNC: 84 U/L — SIGNIFICANT CHANGE UP (ref 40–120)
ALT FLD-CCNC: 8 U/L — LOW (ref 12–78)
ANION GAP SERPL CALC-SCNC: 6 MMOL/L — SIGNIFICANT CHANGE UP (ref 5–17)
AST SERPL-CCNC: 8 U/L — LOW (ref 15–37)
BILIRUB SERPL-MCNC: 0.2 MG/DL — SIGNIFICANT CHANGE UP (ref 0.2–1.2)
BUN SERPL-MCNC: 19 MG/DL — SIGNIFICANT CHANGE UP (ref 7–23)
CALCIUM SERPL-MCNC: 7.9 MG/DL — LOW (ref 8.5–10.1)
CHLORIDE SERPL-SCNC: 108 MMOL/L — SIGNIFICANT CHANGE UP (ref 96–108)
CO2 SERPL-SCNC: 23 MMOL/L — SIGNIFICANT CHANGE UP (ref 22–31)
CREAT SERPL-MCNC: 0.96 MG/DL — SIGNIFICANT CHANGE UP (ref 0.5–1.3)
EGFR: 61 ML/MIN/1.73M2 — SIGNIFICANT CHANGE UP
EGFR: 61 ML/MIN/1.73M2 — SIGNIFICANT CHANGE UP
GLUCOSE SERPL-MCNC: 141 MG/DL — HIGH (ref 70–99)
HAPTOGLOB SERPL-MCNC: 404 MG/DL — HIGH (ref 34–200)
HCT VFR BLD CALC: 27.4 % — LOW (ref 34.5–45)
HGB BLD-MCNC: 8.4 G/DL — LOW (ref 11.5–15.5)
IMMATURE RETICULOCYTE FRACTION %: 15.1 % — SIGNIFICANT CHANGE UP
LDH SERPL L TO P-CCNC: 390 U/L — HIGH (ref 84–241)
MAGNESIUM SERPL-MCNC: 1.9 MG/DL — SIGNIFICANT CHANGE UP (ref 1.6–2.6)
MCHC RBC-ENTMCNC: 25 PG — LOW (ref 27–34)
MCHC RBC-ENTMCNC: 30.7 G/DL — LOW (ref 32–36)
MCV RBC AUTO: 81.5 FL — SIGNIFICANT CHANGE UP (ref 80–100)
NRBC # BLD AUTO: 0 K/UL — SIGNIFICANT CHANGE UP (ref 0–0)
NRBC # FLD: 0 K/UL — SIGNIFICANT CHANGE UP (ref 0–0)
NRBC BLD AUTO-RTO: 0 /100 WBCS — SIGNIFICANT CHANGE UP (ref 0–0)
PHOSPHATE SERPL-MCNC: 3.5 MG/DL — SIGNIFICANT CHANGE UP (ref 2.5–4.5)
PLATELET # BLD AUTO: 317 K/UL — SIGNIFICANT CHANGE UP (ref 150–400)
PMV BLD: 10.3 FL — SIGNIFICANT CHANGE UP (ref 7–13)
POTASSIUM SERPL-MCNC: 4.1 MMOL/L — SIGNIFICANT CHANGE UP (ref 3.5–5.3)
POTASSIUM SERPL-SCNC: 4.1 MMOL/L — SIGNIFICANT CHANGE UP (ref 3.5–5.3)
PROT SERPL-MCNC: 5.4 GM/DL — LOW (ref 6–8.3)
RBC # BLD: 3.36 M/UL — LOW (ref 3.8–5.2)
RBC # FLD: 17.8 % — HIGH (ref 10.3–14.5)
RETICS #: 26.3 K/UL — SIGNIFICANT CHANGE UP (ref 25–125)
RETICS/RBC NFR: 0.8 % — SIGNIFICANT CHANGE UP (ref 0.5–2.5)
RETICULOCYTE HEMOGLOBIN EQUIVALENT: 24 PG — LOW (ref 30.6–40.7)
SODIUM SERPL-SCNC: 137 MMOL/L — SIGNIFICANT CHANGE UP (ref 135–145)
WBC # BLD: 8.97 K/UL — SIGNIFICANT CHANGE UP (ref 3.8–10.5)
WBC # FLD AUTO: 8.97 K/UL — SIGNIFICANT CHANGE UP (ref 3.8–10.5)

## 2025-08-16 PROCEDURE — 99233 SBSQ HOSP IP/OBS HIGH 50: CPT

## 2025-08-16 PROCEDURE — 99223 1ST HOSP IP/OBS HIGH 75: CPT

## 2025-08-16 PROCEDURE — 99223 1ST HOSP IP/OBS HIGH 75: CPT | Mod: FS

## 2025-08-16 RX ORDER — CYST/ALA/Q10/PHOS.SER/DHA/BROC 100-20-50
1 POWDER (GRAM) ORAL DAILY
Refills: 0 | Status: DISCONTINUED | OUTPATIENT
Start: 2025-08-16 | End: 2025-08-19

## 2025-08-16 RX ORDER — POLYETHYLENE GLYCOL 3350 17 G/17G
17 POWDER, FOR SOLUTION ORAL
Refills: 0 | Status: DISCONTINUED | OUTPATIENT
Start: 2025-08-16 | End: 2025-08-19

## 2025-08-16 RX ORDER — BISACODYL 5 MG
10 TABLET, DELAYED RELEASE (ENTERIC COATED) ORAL DAILY
Refills: 0 | Status: DISCONTINUED | OUTPATIENT
Start: 2025-08-16 | End: 2025-08-19

## 2025-08-16 RX ORDER — VANCOMYCIN HCL IN 5 % DEXTROSE 1.5G/250ML
750 PLASTIC BAG, INJECTION (ML) INTRAVENOUS EVERY 12 HOURS
Refills: 0 | Status: DISCONTINUED | OUTPATIENT
Start: 2025-08-16 | End: 2025-08-16

## 2025-08-16 RX ORDER — SENNA 187 MG
2 TABLET ORAL AT BEDTIME
Refills: 0 | Status: DISCONTINUED | OUTPATIENT
Start: 2025-08-16 | End: 2025-08-19

## 2025-08-16 RX ORDER — SODIUM CHLORIDE 9 G/1000ML
500 INJECTION, SOLUTION INTRAVENOUS
Refills: 0 | Status: DISCONTINUED | OUTPATIENT
Start: 2025-08-16 | End: 2025-08-17

## 2025-08-16 RX ORDER — ZINC SULFATE 50(220)MG
220 CAPSULE ORAL DAILY
Refills: 0 | Status: DISCONTINUED | OUTPATIENT
Start: 2025-08-16 | End: 2025-08-19

## 2025-08-16 RX ORDER — FOLIC ACID 1 MG/1
1 TABLET ORAL DAILY
Refills: 0 | Status: DISCONTINUED | OUTPATIENT
Start: 2025-08-16 | End: 2025-08-19

## 2025-08-16 RX ADMIN — Medication 50 MILLIGRAM(S): at 14:11

## 2025-08-16 RX ADMIN — Medication 100 MILLIGRAM(S): at 10:40

## 2025-08-16 RX ADMIN — Medication 220 MILLIGRAM(S): at 17:06

## 2025-08-16 RX ADMIN — Medication 1 TABLET(S): at 16:12

## 2025-08-16 RX ADMIN — Medication 25 GRAM(S): at 05:20

## 2025-08-16 RX ADMIN — Medication 50 MILLIGRAM(S): at 05:21

## 2025-08-16 RX ADMIN — MIDODRINE HYDROCHLORIDE 10 MILLIGRAM(S): 5 TABLET ORAL at 12:44

## 2025-08-16 RX ADMIN — Medication 25 GRAM(S): at 21:53

## 2025-08-16 RX ADMIN — POLYETHYLENE GLYCOL 3350 17 GRAM(S): 17 POWDER, FOR SOLUTION ORAL at 16:08

## 2025-08-16 RX ADMIN — FOLIC ACID 1 MILLIGRAM(S): 1 TABLET ORAL at 10:40

## 2025-08-16 RX ADMIN — Medication 137 MICROGRAM(S): at 05:17

## 2025-08-16 RX ADMIN — Medication 500 MILLIGRAM(S): at 16:12

## 2025-08-16 RX ADMIN — Medication 250 MILLIGRAM(S): at 02:35

## 2025-08-16 RX ADMIN — Medication 25 GRAM(S): at 14:12

## 2025-08-16 RX ADMIN — Medication 50 MILLIGRAM(S): at 21:47

## 2025-08-16 RX ADMIN — Medication 40 MILLIGRAM(S): at 05:17

## 2025-08-16 RX ADMIN — Medication 2 TABLET(S): at 21:44

## 2025-08-16 RX ADMIN — POLYETHYLENE GLYCOL 3350 17 GRAM(S): 17 POWDER, FOR SOLUTION ORAL at 21:44

## 2025-08-17 LAB
ANION GAP SERPL CALC-SCNC: 8 MMOL/L — SIGNIFICANT CHANGE UP (ref 5–17)
BASOPHILS # BLD AUTO: 0.02 K/UL — SIGNIFICANT CHANGE UP (ref 0–0.2)
BASOPHILS NFR BLD AUTO: 0.2 % — SIGNIFICANT CHANGE UP (ref 0–2)
BUN SERPL-MCNC: 22 MG/DL — SIGNIFICANT CHANGE UP (ref 7–23)
CALCIUM SERPL-MCNC: 7.9 MG/DL — LOW (ref 8.5–10.1)
CHLORIDE SERPL-SCNC: 108 MMOL/L — SIGNIFICANT CHANGE UP (ref 96–108)
CO2 SERPL-SCNC: 21 MMOL/L — LOW (ref 22–31)
CREAT SERPL-MCNC: 0.95 MG/DL — SIGNIFICANT CHANGE UP (ref 0.5–1.3)
EGFR: 62 ML/MIN/1.73M2 — SIGNIFICANT CHANGE UP
EGFR: 62 ML/MIN/1.73M2 — SIGNIFICANT CHANGE UP
EOSINOPHIL # BLD AUTO: 0 K/UL — SIGNIFICANT CHANGE UP (ref 0–0.5)
EOSINOPHIL NFR BLD AUTO: 0 % — SIGNIFICANT CHANGE UP (ref 0–6)
GLUCOSE SERPL-MCNC: 134 MG/DL — HIGH (ref 70–99)
HCT VFR BLD CALC: 25.4 % — LOW (ref 34.5–45)
HGB BLD-MCNC: 8.1 G/DL — LOW (ref 11.5–15.5)
IMM GRANULOCYTES # BLD AUTO: 0.19 K/UL — HIGH (ref 0–0.07)
IMM GRANULOCYTES NFR BLD AUTO: 1.5 % — HIGH (ref 0–0.9)
LYMPHOCYTES # BLD AUTO: 0.36 K/UL — LOW (ref 1–3.3)
LYMPHOCYTES NFR BLD AUTO: 2.9 % — LOW (ref 13–44)
MAGNESIUM SERPL-MCNC: 2 MG/DL — SIGNIFICANT CHANGE UP (ref 1.6–2.6)
MCHC RBC-ENTMCNC: 25.9 PG — LOW (ref 27–34)
MCHC RBC-ENTMCNC: 31.9 G/DL — LOW (ref 32–36)
MCV RBC AUTO: 81.2 FL — SIGNIFICANT CHANGE UP (ref 80–100)
MONOCYTES # BLD AUTO: 0.61 K/UL — SIGNIFICANT CHANGE UP (ref 0–0.9)
MONOCYTES NFR BLD AUTO: 4.9 % — SIGNIFICANT CHANGE UP (ref 2–14)
NEUTROPHILS # BLD AUTO: 11.33 K/UL — HIGH (ref 1.8–7.4)
NEUTROPHILS NFR BLD AUTO: 90.5 % — HIGH (ref 43–77)
NRBC # BLD AUTO: 0 K/UL — SIGNIFICANT CHANGE UP (ref 0–0)
NRBC # FLD: 0 K/UL — SIGNIFICANT CHANGE UP (ref 0–0)
NRBC BLD AUTO-RTO: 0 /100 WBCS — SIGNIFICANT CHANGE UP (ref 0–0)
PHOSPHATE SERPL-MCNC: 3.4 MG/DL — SIGNIFICANT CHANGE UP (ref 2.5–4.5)
PLATELET # BLD AUTO: 326 K/UL — SIGNIFICANT CHANGE UP (ref 150–400)
PMV BLD: 10.3 FL — SIGNIFICANT CHANGE UP (ref 7–13)
POTASSIUM SERPL-MCNC: 3.9 MMOL/L — SIGNIFICANT CHANGE UP (ref 3.5–5.3)
POTASSIUM SERPL-SCNC: 3.9 MMOL/L — SIGNIFICANT CHANGE UP (ref 3.5–5.3)
RBC # BLD: 3.13 M/UL — LOW (ref 3.8–5.2)
RBC # FLD: 18.3 % — HIGH (ref 10.3–14.5)
SODIUM SERPL-SCNC: 137 MMOL/L — SIGNIFICANT CHANGE UP (ref 135–145)
WBC # BLD: 12.51 K/UL — HIGH (ref 3.8–10.5)
WBC # FLD AUTO: 12.51 K/UL — HIGH (ref 3.8–10.5)

## 2025-08-17 PROCEDURE — 99233 SBSQ HOSP IP/OBS HIGH 50: CPT

## 2025-08-17 RX ORDER — SODIUM CHLORIDE 9 G/1000ML
500 INJECTION, SOLUTION INTRAVENOUS
Refills: 0 | Status: DISCONTINUED | OUTPATIENT
Start: 2025-08-17 | End: 2025-08-18

## 2025-08-17 RX ADMIN — SODIUM CHLORIDE 100 MILLILITER(S): 9 INJECTION, SOLUTION INTRAVENOUS at 11:48

## 2025-08-17 RX ADMIN — Medication 25 GRAM(S): at 05:58

## 2025-08-17 RX ADMIN — Medication 40 MILLIGRAM(S): at 06:00

## 2025-08-17 RX ADMIN — POLYETHYLENE GLYCOL 3350 17 GRAM(S): 17 POWDER, FOR SOLUTION ORAL at 21:27

## 2025-08-17 RX ADMIN — Medication 100 MILLIGRAM(S): at 09:58

## 2025-08-17 RX ADMIN — Medication 220 MILLIGRAM(S): at 09:57

## 2025-08-17 RX ADMIN — Medication 50 MILLIGRAM(S): at 15:47

## 2025-08-17 RX ADMIN — Medication 1 TABLET(S): at 09:57

## 2025-08-17 RX ADMIN — Medication 2 TABLET(S): at 21:27

## 2025-08-17 RX ADMIN — Medication 50 MILLIGRAM(S): at 21:28

## 2025-08-17 RX ADMIN — Medication 137 MICROGRAM(S): at 05:59

## 2025-08-17 RX ADMIN — Medication 500 MILLIGRAM(S): at 09:57

## 2025-08-17 RX ADMIN — Medication 25 GRAM(S): at 15:48

## 2025-08-17 RX ADMIN — FOLIC ACID 1 MILLIGRAM(S): 1 TABLET ORAL at 09:58

## 2025-08-17 RX ADMIN — Medication 50 MILLIGRAM(S): at 05:59

## 2025-08-18 ENCOUNTER — TRANSCRIPTION ENCOUNTER (OUTPATIENT)
Age: 75
End: 2025-08-18

## 2025-08-18 PROCEDURE — 99233 SBSQ HOSP IP/OBS HIGH 50: CPT

## 2025-08-18 PROCEDURE — 99232 SBSQ HOSP IP/OBS MODERATE 35: CPT

## 2025-08-18 RX ORDER — SODIUM CHLORIDE 9 G/1000ML
500 INJECTION, SOLUTION INTRAVENOUS
Refills: 0 | Status: DISCONTINUED | OUTPATIENT
Start: 2025-08-18 | End: 2025-08-19

## 2025-08-18 RX ORDER — MIDODRINE HYDROCHLORIDE 5 MG/1
10 TABLET ORAL EVERY 8 HOURS
Refills: 0 | Status: DISCONTINUED | OUTPATIENT
Start: 2025-08-18 | End: 2025-08-19

## 2025-08-18 RX ORDER — OXYCODONE HYDROCHLORIDE AND ACETAMINOPHEN 10; 325 MG/1; MG/1
1 TABLET ORAL
Refills: 0 | Status: DISCONTINUED | OUTPATIENT
Start: 2025-08-18 | End: 2025-08-19

## 2025-08-18 RX ORDER — HYDROCORTISONE 20 MG
50 TABLET ORAL EVERY 12 HOURS
Refills: 0 | Status: DISCONTINUED | OUTPATIENT
Start: 2025-08-18 | End: 2025-08-19

## 2025-08-18 RX ADMIN — Medication 50 MILLIGRAM(S): at 06:10

## 2025-08-18 RX ADMIN — Medication 40 MILLIGRAM(S): at 06:10

## 2025-08-18 RX ADMIN — Medication 137 MICROGRAM(S): at 06:10

## 2025-08-18 RX ADMIN — ENOXAPARIN SODIUM 40 MILLIGRAM(S): 100 INJECTION SUBCUTANEOUS at 10:29

## 2025-08-18 RX ADMIN — Medication 50 MILLIGRAM(S): at 18:56

## 2025-08-18 RX ADMIN — Medication 100 MILLIGRAM(S): at 10:29

## 2025-08-18 RX ADMIN — Medication 25 GRAM(S): at 02:09

## 2025-08-18 RX ADMIN — POLYETHYLENE GLYCOL 3350 17 GRAM(S): 17 POWDER, FOR SOLUTION ORAL at 10:29

## 2025-08-18 RX ADMIN — Medication 220 MILLIGRAM(S): at 10:30

## 2025-08-18 RX ADMIN — MIDODRINE HYDROCHLORIDE 10 MILLIGRAM(S): 5 TABLET ORAL at 14:05

## 2025-08-18 RX ADMIN — MIDODRINE HYDROCHLORIDE 10 MILLIGRAM(S): 5 TABLET ORAL at 21:15

## 2025-08-18 RX ADMIN — FOLIC ACID 1 MILLIGRAM(S): 1 TABLET ORAL at 10:29

## 2025-08-18 RX ADMIN — Medication 1 TABLET(S): at 10:29

## 2025-08-18 RX ADMIN — Medication 2 TABLET(S): at 21:14

## 2025-08-18 RX ADMIN — Medication 500 MILLIGRAM(S): at 10:29

## 2025-08-19 ENCOUNTER — TRANSCRIPTION ENCOUNTER (OUTPATIENT)
Age: 75
End: 2025-08-19

## 2025-08-19 VITALS
HEART RATE: 67 BPM | RESPIRATION RATE: 18 BRPM | SYSTOLIC BLOOD PRESSURE: 108 MMHG | DIASTOLIC BLOOD PRESSURE: 69 MMHG | TEMPERATURE: 97 F | OXYGEN SATURATION: 99 %

## 2025-08-19 PROCEDURE — 99239 HOSP IP/OBS DSCHRG MGMT >30: CPT

## 2025-08-19 RX ORDER — SENNA 187 MG
2 TABLET ORAL
Qty: 60 | Refills: 0
Start: 2025-08-19

## 2025-08-19 RX ORDER — ONDANSETRON HCL/PF 4 MG/2 ML
1 VIAL (ML) INJECTION
Qty: 2 | Refills: 0
Start: 2025-08-19

## 2025-08-19 RX ORDER — HYDROCORTISONE 20 MG
1.5 TABLET ORAL
Qty: 45 | Refills: 0
Start: 2025-08-19

## 2025-08-19 RX ORDER — POLYETHYLENE GLYCOL 3350 17 G/17G
17 POWDER, FOR SOLUTION ORAL
Qty: 1 | Refills: 0
Start: 2025-08-19

## 2025-08-19 RX ORDER — CYST/ALA/Q10/PHOS.SER/DHA/BROC 100-20-50
1 POWDER (GRAM) ORAL
Qty: 30 | Refills: 0
Start: 2025-08-19

## 2025-08-19 RX ORDER — MIDODRINE HYDROCHLORIDE 5 MG/1
1 TABLET ORAL
Qty: 90 | Refills: 0
Start: 2025-08-19

## 2025-08-19 RX ORDER — ZINC SULFATE 50(220)MG
1 CAPSULE ORAL
Qty: 10 | Refills: 0
Start: 2025-08-19 | End: 2025-08-28

## 2025-08-19 RX ORDER — HYDROCORTISONE 20 MG
50 TABLET ORAL EVERY 24 HOURS
Refills: 0 | Status: DISCONTINUED | OUTPATIENT
Start: 2025-08-19 | End: 2025-08-19

## 2025-08-19 RX ADMIN — Medication 1 TABLET(S): at 10:30

## 2025-08-19 RX ADMIN — Medication 500 MILLIGRAM(S): at 10:29

## 2025-08-19 RX ADMIN — MIDODRINE HYDROCHLORIDE 10 MILLIGRAM(S): 5 TABLET ORAL at 06:03

## 2025-08-19 RX ADMIN — Medication 220 MILLIGRAM(S): at 10:29

## 2025-08-19 RX ADMIN — Medication 50 MILLIGRAM(S): at 06:03

## 2025-08-19 RX ADMIN — MIDODRINE HYDROCHLORIDE 10 MILLIGRAM(S): 5 TABLET ORAL at 12:51

## 2025-08-19 RX ADMIN — Medication 50 MILLIGRAM(S): at 10:31

## 2025-08-19 RX ADMIN — ENOXAPARIN SODIUM 40 MILLIGRAM(S): 100 INJECTION SUBCUTANEOUS at 10:29

## 2025-08-19 RX ADMIN — Medication 100 MILLIGRAM(S): at 10:30

## 2025-08-19 RX ADMIN — FOLIC ACID 1 MILLIGRAM(S): 1 TABLET ORAL at 10:29

## 2025-08-19 RX ADMIN — Medication 40 MILLIGRAM(S): at 06:03

## 2025-08-19 RX ADMIN — Medication 137 MICROGRAM(S): at 06:02

## 2025-08-29 DIAGNOSIS — R55 SYNCOPE AND COLLAPSE: ICD-10-CM

## 2025-08-29 DIAGNOSIS — C34.90 MALIGNANT NEOPLASM OF UNSPECIFIED PART OF UNSPECIFIED BRONCHUS OR LUNG: ICD-10-CM

## 2025-08-29 DIAGNOSIS — E43 UNSPECIFIED SEVERE PROTEIN-CALORIE MALNUTRITION: ICD-10-CM

## 2025-08-29 DIAGNOSIS — J90 PLEURAL EFFUSION, NOT ELSEWHERE CLASSIFIED: ICD-10-CM

## 2025-08-29 DIAGNOSIS — I08.1 RHEUMATIC DISORDERS OF BOTH MITRAL AND TRICUSPID VALVES: ICD-10-CM

## 2025-08-29 DIAGNOSIS — Z95.2 PRESENCE OF PROSTHETIC HEART VALVE: ICD-10-CM

## 2025-08-29 DIAGNOSIS — I50.32 CHRONIC DIASTOLIC (CONGESTIVE) HEART FAILURE: ICD-10-CM

## 2025-08-29 DIAGNOSIS — C78.89 SECONDARY MALIGNANT NEOPLASM OF OTHER DIGESTIVE ORGANS: ICD-10-CM

## 2025-08-29 DIAGNOSIS — E87.1 HYPO-OSMOLALITY AND HYPONATREMIA: ICD-10-CM

## 2025-08-29 DIAGNOSIS — Z79.890 HORMONE REPLACEMENT THERAPY: ICD-10-CM

## 2025-08-29 DIAGNOSIS — E27.40 UNSPECIFIED ADRENOCORTICAL INSUFFICIENCY: ICD-10-CM

## 2025-08-29 DIAGNOSIS — Z66 DO NOT RESUSCITATE: ICD-10-CM

## 2025-08-29 DIAGNOSIS — E86.0 DEHYDRATION: ICD-10-CM

## 2025-08-29 DIAGNOSIS — C79.72 SECONDARY MALIGNANT NEOPLASM OF LEFT ADRENAL GLAND: ICD-10-CM

## 2025-08-29 DIAGNOSIS — D50.9 IRON DEFICIENCY ANEMIA, UNSPECIFIED: ICD-10-CM

## 2025-08-29 DIAGNOSIS — Z98.49 CATARACT EXTRACTION STATUS, UNSPECIFIED EYE: ICD-10-CM

## 2025-08-29 DIAGNOSIS — I11.0 HYPERTENSIVE HEART DISEASE WITH HEART FAILURE: ICD-10-CM

## 2025-08-29 DIAGNOSIS — Z96.652 PRESENCE OF LEFT ARTIFICIAL KNEE JOINT: ICD-10-CM

## 2025-08-29 DIAGNOSIS — D63.0 ANEMIA IN NEOPLASTIC DISEASE: ICD-10-CM

## 2025-08-29 DIAGNOSIS — E03.9 HYPOTHYROIDISM, UNSPECIFIED: ICD-10-CM

## 2025-08-29 DIAGNOSIS — I95.9 HYPOTENSION, UNSPECIFIED: ICD-10-CM

## 2025-08-29 DIAGNOSIS — Z98.84 BARIATRIC SURGERY STATUS: ICD-10-CM

## (undated) DEVICE — DRSG CURITY GAUZE SPONGE 4 X 4" 12-PLY

## (undated) DEVICE — VALVE SUCTION EVIS 160/200/240

## (undated) DEVICE — SYR SLIP 10CC

## (undated) DEVICE — DRAPE 3/4 SHEET 52X76"

## (undated) DEVICE — TRAP SPECIMEN SPUTUM 40CC

## (undated) DEVICE — DRAPE TOWEL BLUE 17" X 24"

## (undated) DEVICE — VENODYNE/SCD SLEEVE CALF MEDIUM

## (undated) DEVICE — NDL ASPIRATION VIZISHOT2 22G

## (undated) DEVICE — NDL ASPIRATION 22G W SYR

## (undated) DEVICE — SOL IRR POUR NS 0.9% 500ML

## (undated) DEVICE — BALLOON SINGLE FOR BF-UC160F

## (undated) DEVICE — WARMING BLANKET LOWER ADULT

## (undated) DEVICE — DURABLE MEDICAL EQUIPMENT: Type: DURABLE MEDICAL EQUIPMENT

## (undated) DEVICE — ADAPTER FIBEROPTIC BRONCHOSCOPE DUAL AXIS SWIVEL

## (undated) DEVICE — VALVE BIOPSY BRONCHOVIDEOSCOPE

## (undated) DEVICE — POSITIONER STRAP ARMBOARD VELCRO TS-30

## (undated) DEVICE — DRSG TELFA 3 X 8

## (undated) DEVICE — GLV 8 PROTEXIS (WHITE)

## (undated) DEVICE — POSITIONER FOAM EGG CRATE ULNAR 2PCS (PINK)

## (undated) DEVICE — TUBING SUCTION NONCONDUCTIVE 6MM X 12FT